# Patient Record
Sex: MALE | Race: WHITE | NOT HISPANIC OR LATINO
[De-identification: names, ages, dates, MRNs, and addresses within clinical notes are randomized per-mention and may not be internally consistent; named-entity substitution may affect disease eponyms.]

---

## 2017-01-12 ENCOUNTER — APPOINTMENT (OUTPATIENT)
Dept: PLASTIC SURGERY | Facility: CLINIC | Age: 62
End: 2017-01-12

## 2017-01-12 VITALS
WEIGHT: 220 LBS | SYSTOLIC BLOOD PRESSURE: 118 MMHG | RESPIRATION RATE: 15 BRPM | BODY MASS INDEX: 28.23 KG/M2 | HEIGHT: 74 IN | HEART RATE: 65 BPM | DIASTOLIC BLOOD PRESSURE: 63 MMHG

## 2017-06-28 ENCOUNTER — FORM ENCOUNTER (OUTPATIENT)
Age: 62
End: 2017-06-28

## 2017-06-29 ENCOUNTER — APPOINTMENT (OUTPATIENT)
Dept: UROLOGY | Facility: CLINIC | Age: 62
End: 2017-06-29

## 2017-06-29 ENCOUNTER — APPOINTMENT (OUTPATIENT)
Dept: MRI IMAGING | Facility: IMAGING CENTER | Age: 62
End: 2017-06-29

## 2017-06-29 ENCOUNTER — OUTPATIENT (OUTPATIENT)
Dept: OUTPATIENT SERVICES | Facility: HOSPITAL | Age: 62
LOS: 1 days | End: 2017-06-29
Payer: COMMERCIAL

## 2017-06-29 VITALS
WEIGHT: 216 LBS | TEMPERATURE: 98.1 F | HEIGHT: 74 IN | HEART RATE: 63 BPM | RESPIRATION RATE: 17 BRPM | BODY MASS INDEX: 27.72 KG/M2 | DIASTOLIC BLOOD PRESSURE: 75 MMHG | SYSTOLIC BLOOD PRESSURE: 135 MMHG

## 2017-06-29 DIAGNOSIS — Z98.89 OTHER SPECIFIED POSTPROCEDURAL STATES: Chronic | ICD-10-CM

## 2017-06-29 DIAGNOSIS — R31.0 GROSS HEMATURIA: ICD-10-CM

## 2017-06-29 PROCEDURE — 82565 ASSAY OF CREATININE: CPT

## 2017-06-29 PROCEDURE — A9585: CPT

## 2017-06-29 PROCEDURE — 74183 MRI ABD W/O CNTR FLWD CNTR: CPT | Mod: 26

## 2017-06-29 PROCEDURE — 74183 MRI ABD W/O CNTR FLWD CNTR: CPT

## 2017-07-10 ENCOUNTER — APPOINTMENT (OUTPATIENT)
Dept: UROLOGY | Facility: CLINIC | Age: 62
End: 2017-07-10

## 2017-07-10 ENCOUNTER — OUTPATIENT (OUTPATIENT)
Dept: OUTPATIENT SERVICES | Facility: HOSPITAL | Age: 62
LOS: 1 days | End: 2017-07-10
Payer: COMMERCIAL

## 2017-07-10 VITALS
HEART RATE: 63 BPM | TEMPERATURE: 97.5 F | SYSTOLIC BLOOD PRESSURE: 114 MMHG | RESPIRATION RATE: 16 BRPM | DIASTOLIC BLOOD PRESSURE: 68 MMHG

## 2017-07-10 DIAGNOSIS — Z98.89 OTHER SPECIFIED POSTPROCEDURAL STATES: Chronic | ICD-10-CM

## 2017-07-10 DIAGNOSIS — R35.0 FREQUENCY OF MICTURITION: ICD-10-CM

## 2017-07-10 PROCEDURE — 51784 ANAL/URINARY MUSCLE STUDY: CPT

## 2017-07-10 PROCEDURE — 51797 INTRAABDOMINAL PRESSURE TEST: CPT

## 2017-07-10 PROCEDURE — 51728 CYSTOMETROGRAM W/VP: CPT

## 2017-07-10 PROCEDURE — 51741 ELECTRO-UROFLOWMETRY FIRST: CPT

## 2017-07-14 ENCOUNTER — APPOINTMENT (OUTPATIENT)
Dept: UROLOGY | Facility: CLINIC | Age: 62
End: 2017-07-14

## 2017-07-14 VITALS — SYSTOLIC BLOOD PRESSURE: 113 MMHG | HEART RATE: 65 BPM | DIASTOLIC BLOOD PRESSURE: 72 MMHG

## 2017-07-14 DIAGNOSIS — N39.41 URGE INCONTINENCE: ICD-10-CM

## 2017-07-23 ENCOUNTER — FORM ENCOUNTER (OUTPATIENT)
Age: 62
End: 2017-07-23

## 2017-07-24 ENCOUNTER — APPOINTMENT (OUTPATIENT)
Dept: RADIOLOGY | Facility: CLINIC | Age: 62
End: 2017-07-24

## 2017-07-24 ENCOUNTER — OUTPATIENT (OUTPATIENT)
Dept: OUTPATIENT SERVICES | Facility: HOSPITAL | Age: 62
LOS: 1 days | End: 2017-07-24
Payer: COMMERCIAL

## 2017-07-24 DIAGNOSIS — Z98.89 OTHER SPECIFIED POSTPROCEDURAL STATES: Chronic | ICD-10-CM

## 2017-07-24 DIAGNOSIS — Z00.8 ENCOUNTER FOR OTHER GENERAL EXAMINATION: ICD-10-CM

## 2017-07-24 PROCEDURE — 77074 RADEX OSSEOUS SURVEY LMTD: CPT

## 2017-07-27 ENCOUNTER — FORM ENCOUNTER (OUTPATIENT)
Age: 62
End: 2017-07-27

## 2017-07-28 ENCOUNTER — OUTPATIENT (OUTPATIENT)
Dept: OUTPATIENT SERVICES | Facility: HOSPITAL | Age: 62
LOS: 1 days | End: 2017-07-28
Payer: COMMERCIAL

## 2017-07-28 ENCOUNTER — APPOINTMENT (OUTPATIENT)
Dept: NUCLEAR MEDICINE | Facility: IMAGING CENTER | Age: 62
End: 2017-07-28
Payer: COMMERCIAL

## 2017-07-28 DIAGNOSIS — Z98.89 OTHER SPECIFIED POSTPROCEDURAL STATES: Chronic | ICD-10-CM

## 2017-07-28 DIAGNOSIS — M89.9 DISORDER OF BONE, UNSPECIFIED: ICD-10-CM

## 2017-07-28 PROCEDURE — 78306 BONE IMAGING WHOLE BODY: CPT

## 2017-07-28 PROCEDURE — 78306 BONE IMAGING WHOLE BODY: CPT | Mod: 26

## 2017-07-28 PROCEDURE — 78320: CPT | Mod: 26

## 2017-07-28 PROCEDURE — 78999 UNLISTED MISC PX DX NUC MED: CPT

## 2017-07-28 PROCEDURE — A9561: CPT

## 2018-05-31 ENCOUNTER — APPOINTMENT (OUTPATIENT)
Dept: PLASTIC SURGERY | Facility: CLINIC | Age: 63
End: 2018-05-31
Payer: COMMERCIAL

## 2018-05-31 PROCEDURE — 99214 OFFICE O/P EST MOD 30 MIN: CPT

## 2018-06-05 ENCOUNTER — OUTPATIENT (OUTPATIENT)
Dept: OUTPATIENT SERVICES | Facility: HOSPITAL | Age: 63
LOS: 1 days | End: 2018-06-05
Payer: COMMERCIAL

## 2018-06-05 VITALS
OXYGEN SATURATION: 97 % | DIASTOLIC BLOOD PRESSURE: 72 MMHG | HEART RATE: 108 BPM | RESPIRATION RATE: 14 BRPM | WEIGHT: 222.01 LBS | HEIGHT: 72 IN | SYSTOLIC BLOOD PRESSURE: 126 MMHG | TEMPERATURE: 97 F

## 2018-06-05 DIAGNOSIS — M95.0 ACQUIRED DEFORMITY OF NOSE: ICD-10-CM

## 2018-06-05 DIAGNOSIS — Z98.89 OTHER SPECIFIED POSTPROCEDURAL STATES: Chronic | ICD-10-CM

## 2018-06-05 DIAGNOSIS — Z86.79 PERSONAL HISTORY OF OTHER DISEASES OF THE CIRCULATORY SYSTEM: ICD-10-CM

## 2018-06-05 DIAGNOSIS — C44.91 BASAL CELL CARCINOMA OF SKIN, UNSPECIFIED: ICD-10-CM

## 2018-06-05 DIAGNOSIS — M95.9 ACQUIRED DEFORMITY OF MUSCULOSKELETAL SYSTEM, UNSPECIFIED: Chronic | ICD-10-CM

## 2018-06-05 LAB
BUN SERPL-MCNC: 22 MG/DL — SIGNIFICANT CHANGE UP (ref 7–23)
CALCIUM SERPL-MCNC: 9.3 MG/DL — SIGNIFICANT CHANGE UP (ref 8.4–10.5)
CHLORIDE SERPL-SCNC: 101 MMOL/L — SIGNIFICANT CHANGE UP (ref 98–107)
CO2 SERPL-SCNC: 22 MMOL/L — SIGNIFICANT CHANGE UP (ref 22–31)
CREAT SERPL-MCNC: 1.14 MG/DL — SIGNIFICANT CHANGE UP (ref 0.5–1.3)
GLUCOSE SERPL-MCNC: 91 MG/DL — SIGNIFICANT CHANGE UP (ref 70–99)
HCT VFR BLD CALC: 41.5 % — SIGNIFICANT CHANGE UP (ref 39–50)
HGB BLD-MCNC: 13 G/DL — SIGNIFICANT CHANGE UP (ref 13–17)
MCHC RBC-ENTMCNC: 26.7 PG — LOW (ref 27–34)
MCHC RBC-ENTMCNC: 31.3 % — LOW (ref 32–36)
MCV RBC AUTO: 85.4 FL — SIGNIFICANT CHANGE UP (ref 80–100)
NRBC # FLD: 0 — SIGNIFICANT CHANGE UP
PLATELET # BLD AUTO: 290 K/UL — SIGNIFICANT CHANGE UP (ref 150–400)
PMV BLD: 11.7 FL — SIGNIFICANT CHANGE UP (ref 7–13)
POTASSIUM SERPL-MCNC: 4.5 MMOL/L — SIGNIFICANT CHANGE UP (ref 3.5–5.3)
POTASSIUM SERPL-SCNC: 4.5 MMOL/L — SIGNIFICANT CHANGE UP (ref 3.5–5.3)
RBC # BLD: 4.86 M/UL — SIGNIFICANT CHANGE UP (ref 4.2–5.8)
RBC # FLD: 15.8 % — HIGH (ref 10.3–14.5)
SODIUM SERPL-SCNC: 139 MMOL/L — SIGNIFICANT CHANGE UP (ref 135–145)
WBC # BLD: 4.42 K/UL — SIGNIFICANT CHANGE UP (ref 3.8–10.5)
WBC # FLD AUTO: 4.42 K/UL — SIGNIFICANT CHANGE UP (ref 3.8–10.5)

## 2018-06-05 PROCEDURE — 93010 ELECTROCARDIOGRAM REPORT: CPT

## 2018-06-05 NOTE — H&P PST ADULT - NSANTHOSAYNRD_GEN_A_CORE
No. SHRAVAN screening performed.  STOP BANG Legend: 0-2 = LOW Risk; 3-4 = INTERMEDIATE Risk; 5-8 = HIGH Risk

## 2018-06-05 NOTE — H&P PST ADULT - NEGATIVE BREAST SYMPTOMS
no nipple discharge L/no breast lump L/no nipple discharge R/no breast tenderness R/no breast lump R/no breast tenderness L

## 2018-06-05 NOTE — H&P PST ADULT - PROBLEM SELECTOR PLAN 1
Pt scheduled for left nasal reconstruction with integra, possible local flap or skin graft on 6/12/2018.  labs done results pending, ekg done.  Preop teaching done, pt able to verbalize understanding.    Limited ROM of neck, difficulty with extension.

## 2018-06-05 NOTE — H&P PST ADULT - NEGATIVE CARDIOVASCULAR SYMPTOMS
no claudication/no orthopnea/no peripheral edema/no paroxysmal nocturnal dyspnea/no chest pain/no dyspnea on exertion/no palpitations

## 2018-06-05 NOTE — H&P PST ADULT - PMH
Basal cell carcinoma  nose  BPH (benign prostatic hypertrophy)    Bulging  cervical disc  Cataract of right eye    Degenerated intervertebral disc  lumbar  H/O atrial fibrillation without current medication  occurred post op 12/2012, pt. was on Cardizem x1 month afterwards  H/O polycythemia vera    HTN (hypertension)    Hyperlipidemia    Melanoma in situ of skin of trunk    Peripheral neuropathy  "both feet"  Plantar fasciitis, bilateral    Psoriasis (a type of skin inflammation)    Renal cell carcinoma

## 2018-06-05 NOTE — H&P PST ADULT - FAMILY HISTORY
Father  Still living? Unknown  Family history of CHF (congestive heart failure), Age at diagnosis: Age Unknown     Mother  Still living? Unknown  Family history of CHF (congestive heart failure), Age at diagnosis: Age Unknown  Family history of kidney disease, Age at diagnosis: Age Unknown     Sibling  Still living? Unknown  Family history of diabetes mellitus, Age at diagnosis: Age Unknown

## 2018-06-05 NOTE — H&P PST ADULT - NEGATIVE GENERAL SYMPTOMS
no chills/no fever/no weight gain/no polyuria/no polydipsia/no malaise/no sweating/no anorexia/no weight loss/no polyphagia/no fatigue

## 2018-06-05 NOTE — H&P PST ADULT - MUSCULOSKELETAL COMMENTS
lower back and neck arthritis. Pt reports neck stiffness, denies numbness to hands or fingers bilaterally

## 2018-06-05 NOTE — H&P PST ADULT - RS GEN PE MLT RESP DETAILS PC
respirations non-labored/no rhonchi/no rales/no wheezes/clear to auscultation bilaterally no rales/no rhonchi/breath sounds equal/no intercostal retractions/no wheezes/clear to auscultation bilaterally/respirations non-labored/good air movement

## 2018-06-05 NOTE — H&P PST ADULT - GASTROINTESTINAL DETAILS
soft/nontender/bowel sounds normal no distention/no masses palpable/no bruit/no rebound tenderness/no rigidity/soft/nontender/bowel sounds normal/no guarding/no organomegaly

## 2018-06-05 NOTE — H&P PST ADULT - NEGATIVE GENERAL GENITOURINARY SYMPTOMS
no hematuria/no flank pain L/no urinary hesitancy/no dysuria/normal urinary frequency/no flank pain R/no bladder infections

## 2018-06-05 NOTE — H&P PST ADULT - PSH
H/O partial nephrectomy  right-12/17/12  Left varicocele    Melanoma in situ of back  x1-1983, x2-20013  S/P cystoscopy  5/15/14  S/P cystoscopy  TURBT-05/2012 H/O partial nephrectomy  right-12/17/12  Left varicocele    Melanoma in situ of back  x1-1983, x2-05214  Mohs defect  mohs procedure bilateral sides of nose with skin graft from clavicular areas 7/2016  S/P cystoscopy  5/15/14  S/P cystoscopy  TURBT-05/2012

## 2018-06-05 NOTE — H&P PST ADULT - PROBLEM SELECTOR PLAN 2
hx of 1 episode of afib in 2012 tx with Cardizem.  EKG shows afib, Case discussed with Dr. Alvarado pmd , has cardiology in office,  stated to send to pt immediately over.  Discussed with Dr. Kwan , surgical coordinator Lisa notified.

## 2018-06-05 NOTE — H&P PST ADULT - NEUROLOGICAL COMMENTS
possible due to polycythemia vera has for a long time Peripheral neuropathy (B) feet.  Pt reports he gets numbness and tingling (B) feet.  denies numbness / tingling fingers or hands bilaterally

## 2018-06-05 NOTE — H&P PST ADULT - HISTORY OF PRESENT ILLNESS
62y/o male scheduled for left nasal reconstruction with integra, possible local flap or skin graft on 6/12/2018,  Pt states, " 62y/o male scheduled for left nasal reconstruction with integra, possible local flap or skin graft on 6/12/2018,  Pt states, "hx of recurrent basal cell carcinoma with mohs procedure.  Two weeks ago noticed lesion on left side of nose."

## 2018-06-05 NOTE — H&P PST ADULT - SKIN COMMENTS
bandage dressing to nose, blood stained.  No active bleeding noted left side of nose with pink lesion

## 2018-06-11 ENCOUNTER — RX RENEWAL (OUTPATIENT)
Age: 63
End: 2018-06-11

## 2018-06-11 ENCOUNTER — TRANSCRIPTION ENCOUNTER (OUTPATIENT)
Age: 63
End: 2018-06-11

## 2018-06-12 ENCOUNTER — OUTPATIENT (OUTPATIENT)
Dept: OUTPATIENT SERVICES | Facility: HOSPITAL | Age: 63
LOS: 1 days | Discharge: HOME | End: 2018-06-12
Payer: COMMERCIAL

## 2018-06-12 VITALS
DIASTOLIC BLOOD PRESSURE: 59 MMHG | SYSTOLIC BLOOD PRESSURE: 111 MMHG | HEART RATE: 63 BPM | OXYGEN SATURATION: 99 % | RESPIRATION RATE: 20 BRPM

## 2018-06-12 VITALS
OXYGEN SATURATION: 99 % | HEART RATE: 65 BPM | DIASTOLIC BLOOD PRESSURE: 61 MMHG | RESPIRATION RATE: 20 BRPM | HEIGHT: 60 IN | SYSTOLIC BLOOD PRESSURE: 116 MMHG | TEMPERATURE: 98 F | WEIGHT: 222.01 LBS

## 2018-06-12 DIAGNOSIS — Z98.89 OTHER SPECIFIED POSTPROCEDURAL STATES: Chronic | ICD-10-CM

## 2018-06-12 DIAGNOSIS — M95.0 ACQUIRED DEFORMITY OF NOSE: ICD-10-CM

## 2018-06-12 DIAGNOSIS — M95.9 ACQUIRED DEFORMITY OF MUSCULOSKELETAL SYSTEM, UNSPECIFIED: Chronic | ICD-10-CM

## 2018-06-12 PROCEDURE — 14060 TIS TRNFR E/N/E/L 10 SQ CM/<: CPT

## 2018-06-12 NOTE — ASU DISCHARGE PLAN (ADULT/PEDIATRIC). - NOTIFY
Fever greater than 101/Bleeding that does not stop Pain not relieved by Medications/Bleeding that does not stop/Unable to Urinate/Increased Irritability or Sluggishness/Fever greater than 101/Inability to Tolerate Liquids or Foods/Swelling that continues/Persistent Nausea and Vomiting

## 2018-06-18 ENCOUNTER — APPOINTMENT (OUTPATIENT)
Dept: PLASTIC SURGERY | Facility: CLINIC | Age: 63
End: 2018-06-18
Payer: COMMERCIAL

## 2018-06-18 PROCEDURE — 99024 POSTOP FOLLOW-UP VISIT: CPT

## 2018-06-21 ENCOUNTER — OTHER (OUTPATIENT)
Age: 63
End: 2018-06-21

## 2018-06-25 ENCOUNTER — APPOINTMENT (OUTPATIENT)
Dept: PLASTIC SURGERY | Facility: CLINIC | Age: 63
End: 2018-06-25
Payer: COMMERCIAL

## 2018-06-25 PROCEDURE — 99024 POSTOP FOLLOW-UP VISIT: CPT

## 2018-06-28 ENCOUNTER — APPOINTMENT (OUTPATIENT)
Dept: CARDIOLOGY | Facility: CLINIC | Age: 63
End: 2018-06-28
Payer: COMMERCIAL

## 2018-06-28 PROCEDURE — 93015 CV STRESS TEST SUPVJ I&R: CPT

## 2018-07-09 ENCOUNTER — APPOINTMENT (OUTPATIENT)
Dept: PLASTIC SURGERY | Facility: CLINIC | Age: 63
End: 2018-07-09
Payer: COMMERCIAL

## 2018-07-09 PROCEDURE — 99024 POSTOP FOLLOW-UP VISIT: CPT

## 2018-07-16 ENCOUNTER — APPOINTMENT (OUTPATIENT)
Dept: CARDIOLOGY | Facility: CLINIC | Age: 63
End: 2018-07-16
Payer: COMMERCIAL

## 2018-07-16 PROCEDURE — 93015 CV STRESS TEST SUPVJ I&R: CPT

## 2018-07-16 PROCEDURE — A9500: CPT

## 2018-07-16 PROCEDURE — 78452 HT MUSCLE IMAGE SPECT MULT: CPT

## 2018-08-28 ENCOUNTER — APPOINTMENT (OUTPATIENT)
Dept: UROLOGY | Facility: CLINIC | Age: 63
End: 2018-08-28
Payer: COMMERCIAL

## 2018-08-28 ENCOUNTER — OUTPATIENT (OUTPATIENT)
Dept: OUTPATIENT SERVICES | Facility: HOSPITAL | Age: 63
LOS: 1 days | End: 2018-08-28
Payer: COMMERCIAL

## 2018-08-28 DIAGNOSIS — R35.0 FREQUENCY OF MICTURITION: ICD-10-CM

## 2018-08-28 DIAGNOSIS — M95.9 ACQUIRED DEFORMITY OF MUSCULOSKELETAL SYSTEM, UNSPECIFIED: Chronic | ICD-10-CM

## 2018-08-28 DIAGNOSIS — Z98.89 OTHER SPECIFIED POSTPROCEDURAL STATES: Chronic | ICD-10-CM

## 2018-08-28 PROCEDURE — 99213 OFFICE O/P EST LOW 20 MIN: CPT | Mod: 25

## 2018-08-28 PROCEDURE — 76775 US EXAM ABDO BACK WALL LIM: CPT | Mod: 26

## 2018-08-28 PROCEDURE — 76775 US EXAM ABDO BACK WALL LIM: CPT

## 2018-08-29 DIAGNOSIS — Z85.528 PERSONAL HISTORY OF OTHER MALIGNANT NEOPLASM OF KIDNEY: ICD-10-CM

## 2018-08-30 ENCOUNTER — APPOINTMENT (OUTPATIENT)
Dept: PLASTIC SURGERY | Facility: CLINIC | Age: 63
End: 2018-08-30
Payer: COMMERCIAL

## 2018-08-30 DIAGNOSIS — C44.311 BASAL CELL CARCINOMA OF SKIN OF NOSE: ICD-10-CM

## 2018-08-30 PROCEDURE — 99024 POSTOP FOLLOW-UP VISIT: CPT

## 2018-09-12 LAB
CHOLEST SERPL-MCNC: 183 MG/DL
CHOLEST/HDLC SERPL: 4.1 RATIO
HBA1C MFR BLD HPLC: 5.8 %
HDLC SERPL-MCNC: 45 MG/DL
LDLC SERPL CALC-MCNC: 126 MG/DL
PSA SERPL-MCNC: 0.46 NG/ML
TRIGL SERPL-MCNC: 61 MG/DL

## 2018-12-12 RX ORDER — MIRABEGRON 50 MG/1
50 TABLET, FILM COATED, EXTENDED RELEASE ORAL
Qty: 30 | Refills: 3 | Status: DISCONTINUED | COMMUNITY
Start: 2018-08-28 | End: 2018-12-12

## 2018-12-12 RX ORDER — ACETAMINOPHEN AND CODEINE 300; 30 MG/1; MG/1
300-30 TABLET ORAL
Qty: 20 | Refills: 0 | Status: DISCONTINUED | COMMUNITY
Start: 2018-06-11 | End: 2018-12-12

## 2018-12-27 PROBLEM — Z87.39 HISTORY OF GOUT: Status: RESOLVED | Noted: 2018-12-27 | Resolved: 2018-12-27

## 2018-12-27 PROBLEM — Z86.69 HISTORY OF ACUTE CONJUNCTIVITIS: Status: RESOLVED | Noted: 2018-12-27 | Resolved: 2018-12-27

## 2018-12-27 PROBLEM — J22 ACUTE RESPIRATORY INFECTION: Status: RESOLVED | Noted: 2018-12-27 | Resolved: 2018-12-27

## 2018-12-27 RX ORDER — UBIDECARENONE 200 MG
200 CAPSULE ORAL DAILY
Refills: 0 | Status: ACTIVE | COMMUNITY

## 2018-12-28 ENCOUNTER — NON-APPOINTMENT (OUTPATIENT)
Age: 63
End: 2018-12-28

## 2018-12-28 ENCOUNTER — RECORD ABSTRACTING (OUTPATIENT)
Age: 63
End: 2018-12-28

## 2018-12-28 ENCOUNTER — APPOINTMENT (OUTPATIENT)
Dept: INTERNAL MEDICINE | Facility: CLINIC | Age: 63
End: 2018-12-28
Payer: COMMERCIAL

## 2018-12-28 VITALS
BODY MASS INDEX: 28.49 KG/M2 | HEIGHT: 73 IN | WEIGHT: 215 LBS | DIASTOLIC BLOOD PRESSURE: 70 MMHG | SYSTOLIC BLOOD PRESSURE: 112 MMHG | HEART RATE: 54 BPM

## 2018-12-28 DIAGNOSIS — S01.20XA UNSPECIFIED OPEN WOUND OF NOSE, INITIAL ENCOUNTER: ICD-10-CM

## 2018-12-28 DIAGNOSIS — R39.15 URGENCY OF URINATION: ICD-10-CM

## 2018-12-28 DIAGNOSIS — Z84.1 FAMILY HISTORY OF DISORDERS OF KIDNEY AND URETER: ICD-10-CM

## 2018-12-28 DIAGNOSIS — J22 UNSPECIFIED ACUTE LOWER RESPIRATORY INFECTION: ICD-10-CM

## 2018-12-28 DIAGNOSIS — M54.2 CERVICALGIA: ICD-10-CM

## 2018-12-28 DIAGNOSIS — Z86.69 PERSONAL HISTORY OF OTHER DISEASES OF THE NERVOUS SYSTEM AND SENSE ORGANS: ICD-10-CM

## 2018-12-28 DIAGNOSIS — Z87.39 PERSONAL HISTORY OF OTHER DISEASES OF THE MUSCULOSKELETAL SYSTEM AND CONNECTIVE TISSUE: ICD-10-CM

## 2018-12-28 DIAGNOSIS — Z86.19 PERSONAL HISTORY OF OTHER INFECTIOUS AND PARASITIC DISEASES: ICD-10-CM

## 2018-12-28 LAB
BILIRUB UR QL STRIP: NEGATIVE
CLARITY UR: CLEAR
COLLECTION METHOD: NORMAL
GLUCOSE UR-MCNC: NEGATIVE
HCG UR QL: 0.2 EU/DL
HGB UR QL STRIP.AUTO: NORMAL
KETONES UR-MCNC: NEGATIVE
LEUKOCYTE ESTERASE UR QL STRIP: NEGATIVE
NITRITE UR QL STRIP: NEGATIVE
PH UR STRIP: 7
PROT UR STRIP-MCNC: NEGATIVE
SP GR UR STRIP: 1.02

## 2018-12-28 PROCEDURE — 36415 COLL VENOUS BLD VENIPUNCTURE: CPT

## 2018-12-28 PROCEDURE — 93000 ELECTROCARDIOGRAM COMPLETE: CPT

## 2018-12-28 PROCEDURE — 99396 PREV VISIT EST AGE 40-64: CPT | Mod: 25

## 2018-12-28 PROCEDURE — 81003 URINALYSIS AUTO W/O SCOPE: CPT | Mod: QW

## 2018-12-28 RX ORDER — OXYBUTYNIN CHLORIDE 10 MG/1
10 TABLET, EXTENDED RELEASE ORAL DAILY
Qty: 30 | Refills: 0 | Status: DISCONTINUED | COMMUNITY
Start: 2018-11-15 | End: 2018-12-28

## 2018-12-28 NOTE — PLAN
[FreeTextEntry1] : Blood work. \par Follow up with . Cntinued management by Heme-Onc. \par Derm evaluation of lesion in left ear.

## 2018-12-28 NOTE — HEALTH RISK ASSESSMENT
[Fair] : ~his/her~ current health as fair  [Good] : ~his/her~  mood as  good [No falls in past year] : Patient reported no falls in the past year [1] : 2) Feeling down, depressed, or hopeless for several days (1) [None] : None [With Family] : lives with family [Employed] : employed [] :  [Sexually Active] : sexually active [Feels Safe at Home] : Feels safe at home [Fully functional (bathing, dressing, toileting, transferring, walking, feeding)] : Fully functional (bathing, dressing, toileting, transferring, walking, feeding) [Fully functional (using the telephone, shopping, preparing meals, housekeeping, doing laundry, using] : Fully functional and needs no help or supervision to perform IADLs (using the telephone, shopping, preparing meals, housekeeping, doing laundry, using transportation, managing medications and managing finances) [Reports changes in dental health] : Reports changes in dental health [] : No [[id number]] : 2 [Change in mental status noted] : No change in mental status noted [Language] : denies difficulty with language [Behavior] : denies difficulty with behavior [Learning/Retaining New Information] : denies difficulty learning/retaining new information [Handling Complex Tasks] : denies difficulty handling complex tasks [Reasoning] : denies difficulty with reasoning [Spatial Ability and Orientation] : denies difficulty with spatial ability and orientation [Reports changes in hearing] : Reports no changes in hearing [Reports changes in vision] : Reports no changes in vision

## 2018-12-28 NOTE — HISTORY OF PRESENT ILLNESS
[FreeTextEntry1] : CPE [de-identified] : The patient is being seen for a health maintenance evaluation.\par Exercise: The patient exercises regularly--treadmill, weights at least 3x/week\par Diet: No formal diet but low carbs\par Dental: Has had dental exams  \par Hearing: normal\par Vision. Glasses:\par             Checks: 2017\par \par Major problem and concerns:\par Bladder incontinence--has been seen by  had cysto and urodynamics. Options other than watch and wait include Botox injection and electronic stimulation of bladder, both of which the patient refuses.\par Chronic neck pain\par

## 2018-12-28 NOTE — RESULTS/DATA
[Normal] : The 12 - lead ECG is normal [Sinus Bradycardia] : sinus bradycardia [P Waves Normal] : the P wave is normal [ECG Intervals HI.] : HI interval is normal [Normal QRS] : the QRS is normal [ECG Axis] : the QRS axis is normal [Normal ST Segments] : the ST segments are normal [ECG T. Waves] : normal [No Interval Change] : no interval change

## 2018-12-28 NOTE — PHYSICAL EXAM
[No Acute Distress] : no acute distress [Well Nourished] : well nourished [Well Developed] : well developed [Well-Appearing] : well-appearing [Normal Sclera/Conjunctiva] : normal sclera/conjunctiva [EOMI] : extraocular movements intact [Normal Outer Ear/Nose] : the outer ears and nose were normal in appearance [Normal Oropharynx] : the oropharynx was normal [No JVD] : no jugular venous distention [Supple] : supple [No Lymphadenopathy] : no lymphadenopathy [Thyroid Normal, No Nodules] : the thyroid was normal and there were no nodules present [No Respiratory Distress] : no respiratory distress  [Clear to Auscultation] : lungs were clear to auscultation bilaterally [No Accessory Muscle Use] : no accessory muscle use [Normal Percussion] : the chest was normal to percussion [Normal Rate] : normal rate  [Regular Rhythm] : with a regular rhythm [Normal S1, S2] : normal S1 and S2 [No Murmur] : no murmur heard [No Carotid Bruits] : no carotid bruits [No Abdominal Bruit] : a ~M bruit was not heard ~T in the abdomen [No Varicosities] : no varicosities [Pedal Pulses Present] : the pedal pulses are present [No Edema] : there was no peripheral edema [No Extremity Clubbing/Cyanosis] : no extremity clubbing/cyanosis [No Palpable Aorta] : no palpable aorta [Normal Appearance] : normal in appearance [No Nipple Discharge] : no nipple discharge [Soft] : abdomen soft [Non Tender] : non-tender [Non-distended] : non-distended [No Masses] : no abdominal mass palpated [No HSM] : no HSM [Normal Bowel Sounds] : normal bowel sounds [Declined Rectal Exam] : declined rectal exam [Normal Supraclavicular Nodes] : no supraclavicular lymphadenopathy [Normal Axillary Nodes] : no axillary lymphadenopathy [Normal Posterior Cervical Nodes] : no posterior cervical lymphadenopathy [Normal Anterior Cervical Nodes] : no anterior cervical lymphadenopathy [No CVA Tenderness] : no CVA  tenderness [No Spinal Tenderness] : no spinal tenderness [No Joint Swelling] : no joint swelling [Grossly Normal Strength/Tone] : grossly normal strength/tone [No Rash] : no rash [Normal Gait] : normal gait [Coordination Grossly Intact] : coordination grossly intact [No Focal Deficits] : no focal deficits [Deep Tendon Reflexes (DTR)] : deep tendon reflexes were 2+ and symmetric [Speech Grossly Normal] : speech grossly normal [Memory Grossly Normal] : memory grossly normal [Normal Affect] : the affect was normal [Alert and Oriented x3] : oriented to person, place, and time [Normal Mood] : the mood was normal [Normal Insight/Judgement] : insight and judgment were intact [de-identified] : cerumen bilaterally but TMs are normal [de-identified] : small 2 mm lesion in pinna of left ear, not pigmented.

## 2018-12-28 NOTE — REVIEW OF SYSTEMS
[Constipation] : constipation [Incontinence] : incontinence [Frequency] : frequency [Back Pain] : back pain [Negative] : Psychiatric [Fever] : no fever [Chills] : no chills [Fatigue] : no fatigue [Night Sweats] : no night sweats [Recent Change In Weight] : ~T no recent weight change [Earache] : no earache [Nosebleeds] : no nosebleeds [Nasal Discharge] : no nasal discharge [Hoarseness] : no hoarseness [Chest Pain] : no chest pain [Palpitations] : no palpitations [Claudication] : no  leg claudication [Lower Ext Edema] : no lower extremity edema [Orthopena] : no orthopnea [Paroysmal Nocturnal Dyspnea] : no paroysmal nocturnal dyspnea [Dyspnea on Exertion] : not dyspnea on exertion [Abdominal Pain] : no abdominal pain [Nausea] : no nausea [Diarrhea] : no diarrhea [Vomiting] : no vomiting [Heartburn] : no heartburn [Joint Pain] : no joint pain [Joint Stiffness] : no joint stiffness [Joint Swelling] : no joint swelling [Itching] : no itching [Skin Rash] : no skin rash [Headache] : no headache [Dizziness] : no dizziness [Fainting] : no fainting [Confusion] : no confusion [Memory Loss] : no memory loss [Easy Bleeding] : no easy bleeding [Easy Bruising] : no easy bruising [Swollen Glands] : no swollen glands [FreeTextEntry8] : incontinence mild not wearing a pad, uses a urinal [FreeTextEntry9] : lower back and neck pain, cervical spondylopathy with neck pain in trapezii

## 2018-12-31 LAB
ALBUMIN SERPL ELPH-MCNC: 4.6 G/DL
ALP BLD-CCNC: 48 U/L
ALT SERPL-CCNC: 34 U/L
ANION GAP SERPL CALC-SCNC: 10 MMOL/L
AST SERPL-CCNC: 33 U/L
BASOPHILS # BLD AUTO: 0.01 K/UL
BASOPHILS NFR BLD AUTO: 0.3 %
BILIRUB SERPL-MCNC: 0.4 MG/DL
BUN SERPL-MCNC: 17 MG/DL
CALCIUM SERPL-MCNC: 9.8 MG/DL
CHLORIDE SERPL-SCNC: 105 MMOL/L
CHOLEST SERPL-MCNC: 173 MG/DL
CHOLEST/HDLC SERPL: 3.4 RATIO
CO2 SERPL-SCNC: 26 MMOL/L
CREAT SERPL-MCNC: 1.14 MG/DL
EOSINOPHIL # BLD AUTO: 0.04 K/UL
EOSINOPHIL NFR BLD AUTO: 1.3 %
GLUCOSE SERPL-MCNC: 101 MG/DL
HBA1C MFR BLD HPLC: 5.8 %
HCT VFR BLD CALC: 39.7 %
HDLC SERPL-MCNC: 51 MG/DL
HGB BLD-MCNC: 12.5 G/DL
IMM GRANULOCYTES NFR BLD AUTO: 0.3 %
LDLC SERPL CALC-MCNC: 112 MG/DL
LYMPHOCYTES # BLD AUTO: 0.92 K/UL
LYMPHOCYTES NFR BLD AUTO: 28.8 %
MAN DIFF?: NORMAL
MCHC RBC-ENTMCNC: 26.7 PG
MCHC RBC-ENTMCNC: 31.5 GM/DL
MCV RBC AUTO: 84.8 FL
MONOCYTES # BLD AUTO: 0.18 K/UL
MONOCYTES NFR BLD AUTO: 5.6 %
NEUTROPHILS # BLD AUTO: 2.04 K/UL
NEUTROPHILS NFR BLD AUTO: 63.7 %
PLATELET # BLD AUTO: 284 K/UL
POTASSIUM SERPL-SCNC: 5.2 MMOL/L
PROT SERPL-MCNC: 6.6 G/DL
RBC # BLD: 4.68 M/UL
RBC # FLD: 15.9 %
SODIUM SERPL-SCNC: 141 MMOL/L
TRIGL SERPL-MCNC: 48 MG/DL
WBC # FLD AUTO: 3.2 K/UL

## 2019-01-04 ENCOUNTER — APPOINTMENT (OUTPATIENT)
Dept: INTERNAL MEDICINE | Facility: CLINIC | Age: 64
End: 2019-01-04

## 2019-01-18 ENCOUNTER — APPOINTMENT (OUTPATIENT)
Dept: INTERNAL MEDICINE | Facility: CLINIC | Age: 64
End: 2019-01-18
Payer: COMMERCIAL

## 2019-01-18 PROCEDURE — 90732 PPSV23 VACC 2 YRS+ SUBQ/IM: CPT

## 2019-01-18 PROCEDURE — G0009: CPT

## 2019-04-16 ENCOUNTER — MEDICATION RENEWAL (OUTPATIENT)
Age: 64
End: 2019-04-16

## 2019-05-10 ENCOUNTER — APPOINTMENT (OUTPATIENT)
Dept: UROLOGY | Facility: CLINIC | Age: 64
End: 2019-05-10
Payer: COMMERCIAL

## 2019-05-10 PROCEDURE — 99214 OFFICE O/P EST MOD 30 MIN: CPT

## 2019-05-16 ENCOUNTER — APPOINTMENT (OUTPATIENT)
Dept: INTERNAL MEDICINE | Facility: CLINIC | Age: 64
End: 2019-05-16
Payer: COMMERCIAL

## 2019-05-16 ENCOUNTER — CHART COPY (OUTPATIENT)
Age: 64
End: 2019-05-16

## 2019-05-16 VITALS
HEIGHT: 73 IN | HEART RATE: 60 BPM | WEIGHT: 215 LBS | BODY MASS INDEX: 28.49 KG/M2 | SYSTOLIC BLOOD PRESSURE: 130 MMHG | DIASTOLIC BLOOD PRESSURE: 80 MMHG

## 2019-05-16 PROCEDURE — 99214 OFFICE O/P EST MOD 30 MIN: CPT

## 2019-05-16 NOTE — PHYSICAL EXAM
[No Acute Distress] : no acute distress [Well Developed] : well developed [Well Nourished] : well nourished [Normal Voice/Communication] : normal voice/communication [Well-Appearing] : well-appearing [Supple] : supple [No JVD] : no jugular venous distention [Thyroid Normal, No Nodules] : the thyroid was normal and there were no nodules present [No Lymphadenopathy] : no lymphadenopathy [No Respiratory Distress] : no respiratory distress  [Clear to Auscultation] : lungs were clear to auscultation bilaterally [No Accessory Muscle Use] : no accessory muscle use [Regular Rhythm] : with a regular rhythm [Normal Rate] : normal rate  [No Carotid Bruits] : no carotid bruits [Normal S1, S2] : normal S1 and S2 [No Edema] : there was no peripheral edema [Soft] : abdomen soft [Non Tender] : non-tender [Normal Bowel Sounds] : normal bowel sounds [Non-distended] : non-distended [Speech Grossly Normal] : speech grossly normal [Memory Grossly Normal] : memory grossly normal [Normal Affect] : the affect was normal [Alert and Oriented x3] : oriented to person, place, and time [Normal Mood] : the mood was normal

## 2019-05-17 ENCOUNTER — OUTPATIENT (OUTPATIENT)
Dept: OUTPATIENT SERVICES | Facility: HOSPITAL | Age: 64
LOS: 1 days | End: 2019-05-17
Payer: COMMERCIAL

## 2019-05-17 VITALS
TEMPERATURE: 97 F | HEIGHT: 71.5 IN | HEART RATE: 52 BPM | DIASTOLIC BLOOD PRESSURE: 70 MMHG | RESPIRATION RATE: 16 BRPM | SYSTOLIC BLOOD PRESSURE: 120 MMHG | WEIGHT: 220.9 LBS

## 2019-05-17 DIAGNOSIS — N39.41 URGE INCONTINENCE: ICD-10-CM

## 2019-05-17 DIAGNOSIS — M95.9 ACQUIRED DEFORMITY OF MUSCULOSKELETAL SYSTEM, UNSPECIFIED: Chronic | ICD-10-CM

## 2019-05-17 DIAGNOSIS — Z98.89 OTHER SPECIFIED POSTPROCEDURAL STATES: Chronic | ICD-10-CM

## 2019-05-17 LAB
ANION GAP SERPL CALC-SCNC: 12 MMO/L — SIGNIFICANT CHANGE UP (ref 7–14)
APPEARANCE UR: CLEAR — SIGNIFICANT CHANGE UP
BILIRUB UR-MCNC: NEGATIVE — SIGNIFICANT CHANGE UP
BLOOD UR QL VISUAL: NEGATIVE — SIGNIFICANT CHANGE UP
BUN SERPL-MCNC: 23 MG/DL — SIGNIFICANT CHANGE UP (ref 7–23)
CALCIUM SERPL-MCNC: 9.9 MG/DL — SIGNIFICANT CHANGE UP (ref 8.4–10.5)
CHLORIDE SERPL-SCNC: 104 MMOL/L — SIGNIFICANT CHANGE UP (ref 98–107)
CO2 SERPL-SCNC: 25 MMOL/L — SIGNIFICANT CHANGE UP (ref 22–31)
COLOR SPEC: SIGNIFICANT CHANGE UP
CREAT SERPL-MCNC: 1.17 MG/DL — SIGNIFICANT CHANGE UP (ref 0.5–1.3)
GLUCOSE SERPL-MCNC: 97 MG/DL — SIGNIFICANT CHANGE UP (ref 70–99)
GLUCOSE UR-MCNC: NEGATIVE — SIGNIFICANT CHANGE UP
HCT VFR BLD CALC: 39.7 % — SIGNIFICANT CHANGE UP (ref 39–50)
HGB BLD-MCNC: 12.3 G/DL — LOW (ref 13–17)
KETONES UR-MCNC: NEGATIVE — SIGNIFICANT CHANGE UP
LEUKOCYTE ESTERASE UR-ACNC: NEGATIVE — SIGNIFICANT CHANGE UP
MAGNESIUM SERPL-MCNC: 2.1 MG/DL — SIGNIFICANT CHANGE UP (ref 1.6–2.6)
MCHC RBC-ENTMCNC: 26.7 PG — LOW (ref 27–34)
MCHC RBC-ENTMCNC: 31 % — LOW (ref 32–36)
MCV RBC AUTO: 86.3 FL — SIGNIFICANT CHANGE UP (ref 80–100)
NITRITE UR-MCNC: NEGATIVE — SIGNIFICANT CHANGE UP
NRBC # FLD: 0 K/UL — SIGNIFICANT CHANGE UP (ref 0–0)
PH UR: 6.5 — SIGNIFICANT CHANGE UP (ref 5–8)
PHOSPHATE SERPL-MCNC: 3.2 MG/DL — SIGNIFICANT CHANGE UP (ref 2.5–4.5)
PLATELET # BLD AUTO: 263 K/UL — SIGNIFICANT CHANGE UP (ref 150–400)
PMV BLD: 11.3 FL — SIGNIFICANT CHANGE UP (ref 7–13)
POTASSIUM SERPL-MCNC: 4.5 MMOL/L — SIGNIFICANT CHANGE UP (ref 3.5–5.3)
POTASSIUM SERPL-SCNC: 4.5 MMOL/L — SIGNIFICANT CHANGE UP (ref 3.5–5.3)
PROT UR-MCNC: NEGATIVE — SIGNIFICANT CHANGE UP
RBC # BLD: 4.6 M/UL — SIGNIFICANT CHANGE UP (ref 4.2–5.8)
RBC # FLD: 14.7 % — HIGH (ref 10.3–14.5)
SODIUM SERPL-SCNC: 141 MMOL/L — SIGNIFICANT CHANGE UP (ref 135–145)
SP GR SPEC: 1.02 — SIGNIFICANT CHANGE UP (ref 1–1.04)
UROBILINOGEN FLD QL: NORMAL — SIGNIFICANT CHANGE UP
WBC # BLD: 3.4 K/UL — LOW (ref 3.8–10.5)
WBC # FLD AUTO: 3.4 K/UL — LOW (ref 3.8–10.5)

## 2019-05-17 PROCEDURE — 93010 ELECTROCARDIOGRAM REPORT: CPT

## 2019-05-17 RX ORDER — CHLORHEXIDINE GLUCONATE, 0.12% ORAL RINSE 1.2 MG/ML
0.12 SOLUTION DENTAL
Qty: 473 | Refills: 0 | Status: COMPLETED | COMMUNITY
Start: 2018-11-21

## 2019-05-17 RX ORDER — AMOXICILLIN AND CLAVULANATE POTASSIUM 875; 125 MG/1; MG/1
875-125 TABLET, COATED ORAL
Qty: 20 | Refills: 0 | Status: COMPLETED | COMMUNITY
Start: 2019-04-17

## 2019-05-17 NOTE — H&P PST ADULT - NSICDXPASTSURGICALHX_GEN_ALL_CORE_FT
PAST SURGICAL HISTORY:  H/O partial nephrectomy right-12/17/12    Left varicocele     Melanoma in situ of back x1-1983, x2-63513    Mohs defect mohs procedure bilateral sides of nose with skin graft from clavicular areas 7/2016    S/P cystoscopy TURBT-05/2012    S/P cystoscopy 5/15/14

## 2019-05-17 NOTE — H&P PST ADULT - ANTERIOR CERVICAL L
55F, pmh of HTN, DM, HLD presenting with right sided back pain for three days. Patient reports sharp, right sided mid back pain, worse with movement, radiating to her shoulder. Denies any trauma, urinary symptoms, chest pain, difficulty breathing, nausea, vomiting, abdominal pain, rashes. Took Oxycodone and NSAIDs throughout the weekend without relief. 55F, pmh of HTN, DM, HLD presenting with right sided back pain for three days. Patient reports sharp, right sided mid back pain, worse with movement, radiating to her shoulder. Denies any trauma, urinary symptoms, chest pain, difficulty breathing, nausea, vomiting, abdominal pain, rashes. Took Oxycodone and NSAIDs throughout the weekend without relief.  **ATTENDING ADDENDUM (Dr. Keshawn Hernandez): I attest that I have directly and personally interviewed and examined this patient and elicited a comparable history of present illness and review of systems as documented, along with my EM resident. I attest that I have made significant contributions to the documentation where necessary and as noted in the EMR. normal

## 2019-05-17 NOTE — H&P PST ADULT - NSICDXFAMILYHX_GEN_ALL_CORE_FT
FAMILY HISTORY:  Father  Still living? Unknown  Family history of CHF (congestive heart failure), Age at diagnosis: Age Unknown    Mother  Still living? Unknown  Family history of CHF (congestive heart failure), Age at diagnosis: Age Unknown  Family history of kidney disease, Age at diagnosis: Age Unknown    Sibling  Still living? Unknown  Family history of diabetes mellitus, Age at diagnosis: Age Unknown

## 2019-05-17 NOTE — PLAN
[FreeTextEntry1] : The patient is on Xarelto which has a shorter half-life than Coumadin. Less than 48 hours are required to bring the coagulation factors to baseline. He should not be off the Xarelto for more than 48 hours as this may allow an embolus. \par Active atrial fibrillation is not a contraindication to continuation of the surgery if the patient remains hemodynamically stable, although attempts to control rate should be done.

## 2019-05-17 NOTE — H&P PST ADULT - HISTORY OF PRESENT ILLNESS
64y/o male scheduled for left nasal reconstruction with integra, possible local flap or skin graft on 6/12/2018,  Pt states, "hx of recurrent basal cell carcinoma with mohs procedure.  Two weeks ago noticed lesion on left side of nose." 62y/o male presents for preop eval for scheduled cystoscopy with botox on 5/21/19.  Pt with c/o urinary frequency.

## 2019-05-17 NOTE — REVIEW OF SYSTEMS
[Negative] : Cardiovascular [Fatigue] : no fatigue [Chills] : no chills [Fever] : no fever [Night Sweats] : no night sweats [Hot Flashes] : no hot flashes [Nausea] : no nausea [Vomiting] : no vomiting [Diarrhea] : no diarrhea [Constipation] : no constipation [Easy Bleeding] : no easy bleeding [Easy Bruising] : no easy bruising [Swollen Glands] : no swollen glands

## 2019-05-17 NOTE — HISTORY OF PRESENT ILLNESS
[Atrial Fibrillation] : atrial fibrillation [Self] : previous adverse anesthesia reaction [Chronic Anticoagulation] : chronic anticoagulation [(Patient denies any chest pain, claudication, dyspnea on exertion, orthopnea, palpitations or syncope)] : Patient denies any chest pain, claudication, dyspnea on exertion, orthopnea, palpitations or syncope [Excellent (>10 METs)] : Excellent (>10 METs) [Aortic Stenosis] : no aortic stenosis [Recent Myocardial Infarction] : no recent myocardial infarction [Coronary Artery Disease] : no coronary artery disease [Implantable Device/Pacemaker] : no implantable device/pacemaker [COPD] : no COPD [Asthma] : no asthma [Smoker] : not a smoker [Sleep Apnea] : no sleep apnea [Chronic Kidney Disease] : no chronic kidney disease [Diabetes] : no diabetes [FreeTextEntry3] : Gemma Jeter MD [FreeTextEntry2] : 05/21/2019 [FreeTextEntry1] : Botox injection [FreeTextEntry4] : Patient has been refractory to therapy for overactive bladder and urinates frequently. He will undergo botox injection to try to control this. \par He had an episode of atrial fibrillation in the past and is on Xarelto, not warfarin (Coumadin).  [FreeTextEntry7] : Nuclear stress test July 16, 2018 demonstrated normal perfusion and he attained 10.1 METS. .

## 2019-05-17 NOTE — H&P PST ADULT - NSICDXPASTMEDICALHX_GEN_ALL_CORE_FT
PAST MEDICAL HISTORY:  Basal cell carcinoma nose    BPH (benign prostatic hypertrophy)     Bulging cervical disc    Cataract of right eye     Degenerated intervertebral disc lumbar    H/O atrial fibrillation without current medication occurred post op 12/2012, pt. was on Cardizem x1 month afterwards    H/O polycythemia vera     HTN (hypertension)     Hyperlipidemia     Melanoma in situ of skin of trunk     Peripheral neuropathy "both feet"    Plantar fasciitis, bilateral     Psoriasis (a type of skin inflammation)     Renal cell carcinoma

## 2019-05-17 NOTE — ASSESSMENT
[Ischemic Heart Disease] : Ischemic Heart Disease - No (0) [High Risk Surgery - Intraperitoneal, Intrathoracic or Supringuinal Vascular Procedures] : High Risk Surgery - Intraperitoneal, Intrathoracic or Supringuinal Vascular Procedures - No (0) [Congestive Heart Failure] : Congestive Heart Failure - No (0) [Prior Cerebrovascular Accident or TIA] : Prior Cerebrovascular Accident or TIA - No (0) [Creatinine >= 2mg/dL (1 Point)] : Creatinine >= 2mg/dL - No (0) [Insulin-dependent Diabetic (1 Point)] : Insulin-dependent Diabetic - No (0) [0] : 0 , RCRI Class: I, Risk of Post-Op Cardiac Complications: 0.4%, Procedure Risk: Low-Risk [Patient Optimized for Surgery] : Patient optimized for surgery [Modify anticoagulant treatment prior to procedure] : Modify anticoagulant treatment prior to procedure [No Further Testing Recommended] : no further testing recommended [FreeTextEntry5] : Stop Xarelto 48 hours prior to the procedure

## 2019-05-17 NOTE — H&P PST ADULT - NEGATIVE GENERAL SYMPTOMS
no fever/no chills/no malaise/no weight loss/no polydipsia/no fatigue/no weight gain/no polyphagia/no polyuria/no anorexia/no sweating

## 2019-05-17 NOTE — H&P PST ADULT - NEGATIVE GENERAL GENITOURINARY SYMPTOMS
no dysuria/no hematuria/no flank pain L/no bladder infections/normal urinary frequency/no flank pain R/no urinary hesitancy

## 2019-05-17 NOTE — H&P PST ADULT - NEGATIVE ENDOCRINE SYMPTOMS
Price (Use Numbers Only, No Special Characters Or $): 48.00
Other (Free Text): Office Visit
Detail Level: Zone
no cold intolerance/no heat intolerance

## 2019-05-17 NOTE — CONSULT LETTER
[Dear  ___] : Dear  [unfilled], [Please see my note below.] : Please see my note below. [Consult Letter:] : I had the pleasure of evaluating your patient, [unfilled]. [Consult Closing:] : Thank you very much for allowing me to participate in the care of this patient.  If you have any questions, please do not hesitate to contact me. [Sincerely,] : Sincerely, [FreeTextEntry3] : Chichi Lamas MD [facility]

## 2019-05-17 NOTE — H&P PST ADULT - NSICDXPROBLEM_GEN_ALL_CORE_FT
urge incontinence   Scheduled for cystoscopy with botox on 5/21/2019  Preop instructions, gi prophylaxis given  pt verbalized understanding    Polycythemia vera:  pending last hematology note  continue medication per routine schedule  SHRAVAN precautions recommended.  OR booking notified via fax.     Atrial fib:  On xarelto per Dr Lamas (cardiologist), hold for 48 hours prior to surgery  cardiology eval, and most recent cardiology studies in chart.  continue medication per routine schedule    Hypertension:  continue medication per routine schedule

## 2019-05-17 NOTE — RESULTS/DATA
[] : results reviewed [de-identified] : WBC 3.4, H/H normal. He is on Jakafi for polycythemia vera [de-identified] : normal [de-identified] : not sent to us [de-identified] : U/A normal

## 2019-05-17 NOTE — H&P PST ADULT - NEGATIVE CARDIOVASCULAR SYMPTOMS
no paroxysmal nocturnal dyspnea/no claudication/no palpitations/no peripheral edema/no chest pain/no orthopnea/no dyspnea on exertion

## 2019-05-18 LAB
BACTERIA UR CULT: SIGNIFICANT CHANGE UP
SPECIMEN SOURCE: SIGNIFICANT CHANGE UP

## 2019-05-20 ENCOUNTER — TRANSCRIPTION ENCOUNTER (OUTPATIENT)
Age: 64
End: 2019-05-20

## 2019-05-21 ENCOUNTER — OUTPATIENT (OUTPATIENT)
Dept: OUTPATIENT SERVICES | Facility: HOSPITAL | Age: 64
LOS: 1 days | Discharge: HOME | End: 2019-05-21
Payer: COMMERCIAL

## 2019-05-21 ENCOUNTER — RESULT REVIEW (OUTPATIENT)
Age: 64
End: 2019-05-21

## 2019-05-21 ENCOUNTER — APPOINTMENT (OUTPATIENT)
Dept: UROLOGY | Facility: AMBULATORY SURGERY CENTER | Age: 64
End: 2019-05-21

## 2019-05-21 VITALS
WEIGHT: 220.9 LBS | HEART RATE: 56 BPM | DIASTOLIC BLOOD PRESSURE: 68 MMHG | TEMPERATURE: 98 F | SYSTOLIC BLOOD PRESSURE: 123 MMHG | HEIGHT: 71.5 IN | RESPIRATION RATE: 98 BRPM

## 2019-05-21 VITALS — OXYGEN SATURATION: 97 % | HEART RATE: 50 BPM | RESPIRATION RATE: 12 BRPM

## 2019-05-21 DIAGNOSIS — Z98.89 OTHER SPECIFIED POSTPROCEDURAL STATES: Chronic | ICD-10-CM

## 2019-05-21 DIAGNOSIS — N39.41 URGE INCONTINENCE: ICD-10-CM

## 2019-05-21 DIAGNOSIS — M95.9 ACQUIRED DEFORMITY OF MUSCULOSKELETAL SYSTEM, UNSPECIFIED: Chronic | ICD-10-CM

## 2019-05-21 PROCEDURE — 88307 TISSUE EXAM BY PATHOLOGIST: CPT | Mod: 26

## 2019-05-21 PROCEDURE — 52235 CYSTOSCOPY AND TREATMENT: CPT

## 2019-05-21 RX ORDER — SODIUM CHLORIDE 9 MG/ML
1000 INJECTION, SOLUTION INTRAVENOUS
Refills: 0 | Status: DISCONTINUED | OUTPATIENT
Start: 2019-05-21 | End: 2019-06-05

## 2019-05-21 RX ORDER — AZTREONAM 2 G
1 VIAL (EA) INJECTION
Qty: 6 | Refills: 0
Start: 2019-05-21 | End: 2019-05-23

## 2019-05-21 RX ORDER — RIVAROXABAN 15 MG-20MG
1 KIT ORAL
Qty: 0 | Refills: 0 | DISCHARGE

## 2019-05-21 NOTE — ASU DISCHARGE PLAN (ADULT/PEDIATRIC) - ASU DC SPECIAL INSTRUCTIONSFT
Please follow up with Dr. Jeter this Thursday 5/23 for oates catheter removal.  Please take Bactrim for 3 days post operatively for UTI prophylaxis.  You may take tylenol as needed for pain.  Please contact the office at [phone] if you have an issues with dark thick blood in your urine.

## 2019-05-21 NOTE — ASU DISCHARGE PLAN (ADULT/PEDIATRIC) - CALL YOUR DOCTOR IF YOU HAVE ANY OF THE FOLLOWING:
Unable to urinate/Bleeding that does not stop/Pain not relieved by Medications/Fever greater than (need to indicate Fahrenheit or Celsius) Unable to urinate/Fever greater than (need to indicate Fahrenheit or Celsius)/Swelling that gets worse/Wound/Surgical Site with redness, or foul smelling discharge or pus/Bleeding that does not stop/Increased irritability or sluggishness/Nausea and vomiting that does not stop/Pain not relieved by Medications/Inability to tolerate liquids or foods

## 2019-05-21 NOTE — ASU DISCHARGE PLAN (ADULT/PEDIATRIC) - CARE PROVIDER_API CALL
Gemma Jeter)  Urology  [address] [place] [address], [place] [address]  Phone: [phone]  Fax: [phone]  Follow Up Time:

## 2019-05-24 LAB — SURGICAL PATHOLOGY STUDY: SIGNIFICANT CHANGE UP

## 2019-05-29 ENCOUNTER — OTHER (OUTPATIENT)
Age: 64
End: 2019-05-29

## 2019-05-29 ENCOUNTER — RESULT REVIEW (OUTPATIENT)
Age: 64
End: 2019-05-29

## 2019-06-04 ENCOUNTER — MEDICATION RENEWAL (OUTPATIENT)
Age: 64
End: 2019-06-04

## 2019-06-05 ENCOUNTER — OTHER (OUTPATIENT)
Age: 64
End: 2019-06-05

## 2019-06-13 ENCOUNTER — OUTPATIENT (OUTPATIENT)
Dept: OUTPATIENT SERVICES | Facility: HOSPITAL | Age: 64
LOS: 1 days | End: 2019-06-13

## 2019-06-13 VITALS
OXYGEN SATURATION: 98 % | DIASTOLIC BLOOD PRESSURE: 70 MMHG | HEART RATE: 68 BPM | TEMPERATURE: 97 F | RESPIRATION RATE: 14 BRPM | HEIGHT: 73 IN | SYSTOLIC BLOOD PRESSURE: 122 MMHG | WEIGHT: 220.9 LBS

## 2019-06-13 DIAGNOSIS — Z98.89 OTHER SPECIFIED POSTPROCEDURAL STATES: Chronic | ICD-10-CM

## 2019-06-13 DIAGNOSIS — C67.9 MALIGNANT NEOPLASM OF BLADDER, UNSPECIFIED: ICD-10-CM

## 2019-06-13 DIAGNOSIS — M95.9 ACQUIRED DEFORMITY OF MUSCULOSKELETAL SYSTEM, UNSPECIFIED: Chronic | ICD-10-CM

## 2019-06-13 DIAGNOSIS — Z86.79 PERSONAL HISTORY OF OTHER DISEASES OF THE CIRCULATORY SYSTEM: ICD-10-CM

## 2019-06-13 DIAGNOSIS — N32.89 OTHER SPECIFIED DISORDERS OF BLADDER: ICD-10-CM

## 2019-06-13 LAB
APPEARANCE UR: CLEAR — SIGNIFICANT CHANGE UP
BILIRUB UR-MCNC: NEGATIVE — SIGNIFICANT CHANGE UP
BLOOD UR QL VISUAL: SIGNIFICANT CHANGE UP
COLOR SPEC: SIGNIFICANT CHANGE UP
GLUCOSE UR-MCNC: NEGATIVE — SIGNIFICANT CHANGE UP
HCT VFR BLD CALC: 39.9 % — SIGNIFICANT CHANGE UP (ref 39–50)
HGB BLD-MCNC: 12.6 G/DL — LOW (ref 13–17)
KETONES UR-MCNC: NEGATIVE — SIGNIFICANT CHANGE UP
LEUKOCYTE ESTERASE UR-ACNC: NEGATIVE — SIGNIFICANT CHANGE UP
MCHC RBC-ENTMCNC: 27.3 PG — SIGNIFICANT CHANGE UP (ref 27–34)
MCHC RBC-ENTMCNC: 31.6 % — LOW (ref 32–36)
MCV RBC AUTO: 86.4 FL — SIGNIFICANT CHANGE UP (ref 80–100)
MUCOUS THREADS # UR AUTO: SIGNIFICANT CHANGE UP
NITRITE UR-MCNC: NEGATIVE — SIGNIFICANT CHANGE UP
NRBC # FLD: 0 K/UL — SIGNIFICANT CHANGE UP (ref 0–0)
PH UR: 7 — SIGNIFICANT CHANGE UP (ref 5–8)
PLATELET # BLD AUTO: 291 K/UL — SIGNIFICANT CHANGE UP (ref 150–400)
PMV BLD: 11.3 FL — SIGNIFICANT CHANGE UP (ref 7–13)
PROT UR-MCNC: NEGATIVE — SIGNIFICANT CHANGE UP
RBC # BLD: 4.62 M/UL — SIGNIFICANT CHANGE UP (ref 4.2–5.8)
RBC # FLD: 15 % — HIGH (ref 10.3–14.5)
RBC CASTS # UR COMP ASSIST: HIGH (ref 0–?)
SP GR SPEC: 1.01 — SIGNIFICANT CHANGE UP (ref 1–1.04)
UROBILINOGEN FLD QL: NORMAL — SIGNIFICANT CHANGE UP
WBC # BLD: 2.99 K/UL — LOW (ref 3.8–10.5)
WBC # FLD AUTO: 2.99 K/UL — LOW (ref 3.8–10.5)
WBC UR QL: SIGNIFICANT CHANGE UP (ref 0–?)

## 2019-06-13 RX ORDER — RIVAROXABAN 15 MG-20MG
1 KIT ORAL
Qty: 0 | Refills: 0 | DISCHARGE

## 2019-06-13 RX ORDER — HYDROXYZINE HCL 10 MG
1 TABLET ORAL
Qty: 0 | Refills: 0 | DISCHARGE

## 2019-06-13 NOTE — H&P PST ADULT - HEMATOLOGY/LYMPHATICS COMMENTS
Polycythemia vera.  Well controlled with current medicaiton Polycythemia vera.  Well controlled with current medication

## 2019-06-13 NOTE — H&P PST ADULT - NSICDXPASTSURGICALHX_GEN_ALL_CORE_FT
PAST SURGICAL HISTORY:  H/O partial nephrectomy right-12/17/12    Left varicocele     Melanoma in situ of back x1-1983, x2-06860    Mohs defect mohs procedure bilateral sides of nose with skin graft from clavicular areas 7/2016    S/P cystoscopy TURBT-05/2012    S/P cystoscopy 5/15/14 PAST SURGICAL HISTORY:  H/O partial nephrectomy right-12/17/12    Left varicocele     Melanoma in situ of back x1-1983, x2-80313    Mohs defect mohs procedure bilateral sides of nose with skin graft from clavicular areas 7/2016    S/P cystoscopy TURBT-05/2012    S/P cystoscopy 5/15/14 PAST SURGICAL HISTORY:  H/O partial nephrectomy right-12/17/12    Left varicocele     Melanoma in situ of back x1-1983, x2-23510    Mohs defect mohs procedure bilateral sides of nose with skin graft from clavicular areas 7/2016    S/P cystoscopy TURBT-05/2012    S/P cystoscopy 5/15/14

## 2019-06-13 NOTE — H&P PST ADULT - CARDIOVASCULAR COMMENTS
Hx afib on Xarelto.  Pt was advised by Cardio to stop 2 days before.  Last dose 6/17/19 pm dose Hx afib on Xarelto.  Pt was advised by Cardio to stop 2 days before.  I spoke with Dr. Lamas, Last dose 6/17/19 pm

## 2019-06-13 NOTE — H&P PST ADULT - NSICDXPROBLEM_GEN_ALL_CORE_FT
PROBLEM DIAGNOSES  Problem: Bladder mass  Assessment and Plan: cystoscopy, Bladder biopsy TURBT 6/20/19.  CBC BMP UA CS EKG  Pre-op instructions given and explained.   SHRAVAN precautions, OR booking informed    Problem: History of atrial fibrillation  Assessment and Plan: Pt on Xarelto.  I spoke with Dr. Lamas, PMD/ CArdio.  Last dose Xarelto 6/17/19 PM.  Pt aware

## 2019-06-13 NOTE — H&P PST ADULT - GENITOURINARY COMMENTS
62 y/o male with hx of increase in urinary frequency x several years.  Pt was scheduled for Cystoscopy with Botox on 5/21/19, bladder mass was discovered. Pt had TURBT at that time.  Pt reports he had post op imaging, with questionable finding. now scheduled for cystoscopy, Bladder biopsy TURBT 6/20/19.

## 2019-06-13 NOTE — H&P PST ADULT - NSICDXPASTMEDICALHX_GEN_ALL_CORE_FT
PAST MEDICAL HISTORY:  Basal cell carcinoma nose    BPH (benign prostatic hypertrophy)     Bulging cervical disc    Cataract of right eye     Degenerated intervertebral disc lumbar    H/O atrial fibrillation without current medication occurred post op 12/2012, pt. was on Cardizem x1 month afterwards    H/O polycythemia vera     HTN (hypertension)     Hyperlipidemia     Melanoma in situ of skin of trunk     Peripheral neuropathy "both feet"    Plantar fasciitis, bilateral     Psoriasis (a type of skin inflammation)     Renal cell carcinoma PAST MEDICAL HISTORY:  Basal cell carcinoma nose    BPH (benign prostatic hypertrophy)     Bulging cervical disc    Cataract of right eye     Degenerated intervertebral disc lumbar    H/O atrial fibrillation without current medication occurred post op 12/2012, pt. was on Cardizem x1 month afterwards    H/O polycythemia vera     HTN (hypertension)     Hyperlipidemia     Melanoma in situ of skin of trunk     Peripheral neuropathy "both feet"    Plantar fasciitis, bilateral     Polycythemia vera    Psoriasis (a type of skin inflammation)     Renal cell carcinoma

## 2019-06-13 NOTE — H&P PST ADULT - ASSESSMENT
64 y/o male with hx of increase in urinary frequency x several years.  Pt was scheduled for Cystoscopy with Botox on 5/21/19, bladder mass was discovered. Pt had TURBT at that time.  Pt reports he had post op imaging, with questionable finding.  Now scheduled for cystoscopy, Bladder biopsy TURBT 6/20/19.

## 2019-06-14 LAB — SPECIMEN SOURCE: SIGNIFICANT CHANGE UP

## 2019-06-15 LAB — BACTERIA UR CULT: SIGNIFICANT CHANGE UP

## 2019-06-19 ENCOUNTER — TRANSCRIPTION ENCOUNTER (OUTPATIENT)
Age: 64
End: 2019-06-19

## 2019-06-20 ENCOUNTER — OUTPATIENT (OUTPATIENT)
Dept: OUTPATIENT SERVICES | Facility: HOSPITAL | Age: 64
LOS: 1 days | Discharge: HOME | End: 2019-06-20
Payer: COMMERCIAL

## 2019-06-20 ENCOUNTER — RESULT REVIEW (OUTPATIENT)
Age: 64
End: 2019-06-20

## 2019-06-20 ENCOUNTER — APPOINTMENT (OUTPATIENT)
Dept: UROLOGY | Facility: AMBULATORY SURGERY CENTER | Age: 64
End: 2019-06-20

## 2019-06-20 VITALS
SYSTOLIC BLOOD PRESSURE: 109 MMHG | HEART RATE: 48 BPM | DIASTOLIC BLOOD PRESSURE: 65 MMHG | HEIGHT: 73 IN | RESPIRATION RATE: 18 BRPM | OXYGEN SATURATION: 99 % | WEIGHT: 220.9 LBS | TEMPERATURE: 98 F

## 2019-06-20 VITALS — HEART RATE: 52 BPM | OXYGEN SATURATION: 99 % | SYSTOLIC BLOOD PRESSURE: 115 MMHG | DIASTOLIC BLOOD PRESSURE: 72 MMHG

## 2019-06-20 DIAGNOSIS — Z98.89 OTHER SPECIFIED POSTPROCEDURAL STATES: Chronic | ICD-10-CM

## 2019-06-20 DIAGNOSIS — C67.9 MALIGNANT NEOPLASM OF BLADDER, UNSPECIFIED: ICD-10-CM

## 2019-06-20 DIAGNOSIS — M95.9 ACQUIRED DEFORMITY OF MUSCULOSKELETAL SYSTEM, UNSPECIFIED: Chronic | ICD-10-CM

## 2019-06-20 PROCEDURE — 88307 TISSUE EXAM BY PATHOLOGIST: CPT | Mod: 26

## 2019-06-20 PROCEDURE — 52235 CYSTOSCOPY AND TREATMENT: CPT

## 2019-06-20 NOTE — ASU DISCHARGE PLAN (ADULT/PEDIATRIC) - CARE PROVIDER_API CALL
Carter Love)  Urology  [address] [place] [address], [place] [address]  Phone: [phone]  Fax: [phone]  Follow Up Time:

## 2019-06-21 ENCOUNTER — INPATIENT (INPATIENT)
Facility: HOSPITAL | Age: 64
LOS: 2 days | Discharge: HOME | End: 2019-06-24
Attending: INTERNAL MEDICINE | Admitting: INTERNAL MEDICINE
Payer: COMMERCIAL

## 2019-06-21 ENCOUNTER — APPOINTMENT (OUTPATIENT)
Dept: UROLOGY | Facility: CLINIC | Age: 64
End: 2019-06-21
Payer: COMMERCIAL

## 2019-06-21 VITALS
SYSTOLIC BLOOD PRESSURE: 128 MMHG | RESPIRATION RATE: 17 BRPM | TEMPERATURE: 97.9 F | BODY MASS INDEX: 28.49 KG/M2 | DIASTOLIC BLOOD PRESSURE: 68 MMHG | HEART RATE: 60 BPM | HEIGHT: 73 IN | WEIGHT: 215 LBS

## 2019-06-21 VITALS
HEART RATE: 70 BPM | RESPIRATION RATE: 50 BRPM | DIASTOLIC BLOOD PRESSURE: 64 MMHG | TEMPERATURE: 99 F | OXYGEN SATURATION: 95 % | SYSTOLIC BLOOD PRESSURE: 125 MMHG

## 2019-06-21 DIAGNOSIS — M95.9 ACQUIRED DEFORMITY OF MUSCULOSKELETAL SYSTEM, UNSPECIFIED: Chronic | ICD-10-CM

## 2019-06-21 DIAGNOSIS — Z98.89 OTHER SPECIFIED POSTPROCEDURAL STATES: Chronic | ICD-10-CM

## 2019-06-21 LAB
ALBUMIN SERPL ELPH-MCNC: 3.8 G/DL — SIGNIFICANT CHANGE UP (ref 3.3–5)
ALP SERPL-CCNC: 41 U/L — SIGNIFICANT CHANGE UP (ref 40–120)
ALT FLD-CCNC: 18 U/L — SIGNIFICANT CHANGE UP (ref 4–41)
ANION GAP SERPL CALC-SCNC: 11 MMO/L — SIGNIFICANT CHANGE UP (ref 7–14)
ANION GAP SERPL CALC-SCNC: 16 MMO/L — HIGH (ref 7–14)
APTT BLD: 28.2 SEC — SIGNIFICANT CHANGE UP (ref 27.5–36.3)
AST SERPL-CCNC: 28 U/L — SIGNIFICANT CHANGE UP (ref 4–40)
BILIRUB SERPL-MCNC: 1.1 MG/DL — SIGNIFICANT CHANGE UP (ref 0.2–1.2)
BUN SERPL-MCNC: 17 MG/DL — SIGNIFICANT CHANGE UP (ref 7–23)
BUN SERPL-MCNC: 17 MG/DL — SIGNIFICANT CHANGE UP (ref 7–23)
CALCIUM SERPL-MCNC: 8.6 MG/DL — SIGNIFICANT CHANGE UP (ref 8.4–10.5)
CALCIUM SERPL-MCNC: 9.1 MG/DL — SIGNIFICANT CHANGE UP (ref 8.4–10.5)
CHLORIDE SERPL-SCNC: 85 MMOL/L — LOW (ref 98–107)
CHLORIDE SERPL-SCNC: 86 MMOL/L — LOW (ref 98–107)
CHLORIDE UR-SCNC: < 20 MMOL/L — SIGNIFICANT CHANGE UP
CO2 SERPL-SCNC: 16 MMOL/L — LOW (ref 22–31)
CO2 SERPL-SCNC: 17 MMOL/L — LOW (ref 22–31)
CREAT SERPL-MCNC: 0.95 MG/DL — SIGNIFICANT CHANGE UP (ref 0.5–1.3)
CREAT SERPL-MCNC: 1.01 MG/DL — SIGNIFICANT CHANGE UP (ref 0.5–1.3)
D DIMER BLD IA.RAPID-MCNC: 224 NG/ML — SIGNIFICANT CHANGE UP
GLUCOSE SERPL-MCNC: 125 MG/DL — HIGH (ref 70–99)
GLUCOSE SERPL-MCNC: 144 MG/DL — HIGH (ref 70–99)
HCT VFR BLD CALC: 29.4 % — LOW (ref 39–50)
HGB BLD-MCNC: 10 G/DL — LOW (ref 13–17)
INR BLD: 1.65 — HIGH (ref 0.88–1.17)
MAGNESIUM SERPL-MCNC: 1.5 MG/DL — LOW (ref 1.6–2.6)
MCHC RBC-ENTMCNC: 27.5 PG — SIGNIFICANT CHANGE UP (ref 27–34)
MCHC RBC-ENTMCNC: 34 % — SIGNIFICANT CHANGE UP (ref 32–36)
MCV RBC AUTO: 81 FL — SIGNIFICANT CHANGE UP (ref 80–100)
NRBC # FLD: 0 K/UL — SIGNIFICANT CHANGE UP (ref 0–0)
OSMOLALITY UR: 337 MOSMO/KG — SIGNIFICANT CHANGE UP (ref 50–1200)
PHOSPHATE SERPL-MCNC: 3.8 MG/DL — SIGNIFICANT CHANGE UP (ref 2.5–4.5)
PLATELET # BLD AUTO: 248 K/UL — SIGNIFICANT CHANGE UP (ref 150–400)
PMV BLD: 11.3 FL — SIGNIFICANT CHANGE UP (ref 7–13)
POTASSIUM SERPL-MCNC: 3.4 MMOL/L — LOW (ref 3.5–5.3)
POTASSIUM SERPL-MCNC: 5.3 MMOL/L — SIGNIFICANT CHANGE UP (ref 3.5–5.3)
POTASSIUM SERPL-SCNC: 3.4 MMOL/L — LOW (ref 3.5–5.3)
POTASSIUM SERPL-SCNC: 5.3 MMOL/L — SIGNIFICANT CHANGE UP (ref 3.5–5.3)
POTASSIUM UR-SCNC: 27.9 MMOL/L — SIGNIFICANT CHANGE UP
PROT SERPL-MCNC: 5.8 G/DL — LOW (ref 6–8.3)
PROTHROM AB SERPL-ACNC: 18.6 SEC — HIGH (ref 9.8–13.1)
RBC # BLD: 3.63 M/UL — LOW (ref 4.2–5.8)
RBC # FLD: 13.9 % — SIGNIFICANT CHANGE UP (ref 10.3–14.5)
SODIUM SERPL-SCNC: 114 MMOL/L — CRITICAL LOW (ref 135–145)
SODIUM SERPL-SCNC: 117 MMOL/L — CRITICAL LOW (ref 135–145)
SODIUM UR-SCNC: 25 MMOL/L — SIGNIFICANT CHANGE UP
WBC # BLD: 8.23 K/UL — SIGNIFICANT CHANGE UP (ref 3.8–10.5)
WBC # FLD AUTO: 8.23 K/UL — SIGNIFICANT CHANGE UP (ref 3.8–10.5)

## 2019-06-21 PROCEDURE — 71045 X-RAY EXAM CHEST 1 VIEW: CPT | Mod: 26

## 2019-06-21 PROCEDURE — 70450 CT HEAD/BRAIN W/O DYE: CPT | Mod: 26

## 2019-06-21 PROCEDURE — 99213 OFFICE O/P EST LOW 20 MIN: CPT

## 2019-06-21 RX ORDER — SODIUM CHLORIDE 9 MG/ML
1000 INJECTION INTRAMUSCULAR; INTRAVENOUS; SUBCUTANEOUS ONCE
Refills: 0 | Status: COMPLETED | OUTPATIENT
Start: 2019-06-21 | End: 2019-06-21

## 2019-06-21 RX ORDER — FAMOTIDINE 10 MG/ML
20 INJECTION INTRAVENOUS ONCE
Refills: 0 | Status: COMPLETED | OUTPATIENT
Start: 2019-06-21 | End: 2019-06-21

## 2019-06-21 RX ADMIN — FAMOTIDINE 20 MILLIGRAM(S): 10 INJECTION INTRAVENOUS at 20:54

## 2019-06-21 RX ADMIN — Medication 0.5 MILLIGRAM(S): at 18:53

## 2019-06-21 RX ADMIN — SODIUM CHLORIDE 333.33 MILLILITER(S): 9 INJECTION INTRAMUSCULAR; INTRAVENOUS; SUBCUTANEOUS at 23:01

## 2019-06-21 NOTE — ED PROVIDER NOTE - CLINICAL SUMMARY MEDICAL DECISION MAKING FREE TEXT BOX
64yM w/pmhx afib on xarelto, polycythemia vera, HTN, HLD, hx renal cell carcinoma, recently dx bladder cancer yesterday? presenting with hyperventilation. Pt has oates in place. Will check cbc/cmp/d-dimer. Will trial ativan. CXR 64yM w/pmhx afib on xarelto, polycythemia vera, HTN, HLD, hx renal cell carcinoma, recently dx bladder cancer yesterday? presenting with hyperventilation. Pt has oates in place. Will check cbc/cmp/d-dimer. Will trial ativan. CXR, CT head. Will consult neuro given expressive aphasia.

## 2019-06-21 NOTE — ED ADULT NURSE NOTE - OBJECTIVE STATEMENT
Aleshia RN: Patient presents for hyperventilation s/p urologic biopsy today and indwelling oates placed for retention. Patient denies CP/ weakness. Patient was anxious, tachypneic and expressing concern about his condition. Currently respirations have improved and able to answer questions wife at bedside soothing patient. Placed on cardiac monitor, vitals stable sinus elaine on monitor. MD and EDT at bedside performing EKG. Patient appears pale on exam, has oates draining to gravity bright red urine. #20g IV placed rt forearm. Plan is labwork, ekg, ativan and reassess. Will endorse to Primary RN.

## 2019-06-21 NOTE — PHYSICAL EXAM
[General Appearance - In No Acute Distress] : no acute distress [Abdomen Soft] : soft [Costovertebral Angle Tenderness] : no ~M costovertebral angle tenderness [] : no respiratory distress [Respiration, Rhythm And Depth] : normal respiratory rhythm and effort [Exaggerated Use Of Accessory Muscles For Inspiration] : no accessory muscle use [Oriented To Time, Place, And Person] : oriented to person, place, and time [Affect] : the affect was normal [Mood] : the mood was normal [Not Anxious] : not anxious [FreeTextEntry1] : pain with palpation of bladder

## 2019-06-21 NOTE — CONSULT NOTE ADULT - SUBJECTIVE AND OBJECTIVE BOX
HPI: This is a 64 year old male with a medical history significant for Afib on Xarelto, polycythemia vera, HTN, HLD, hx renal cell carcinoma s/p right partial nephrectomy 2012, hx bladder cancer presenting to the ED with hyperventilation and expressive aphasia, Urology consult called for gross hematuria in setting of recent TURBT.  Patient underwent repeat TURBT with Dr. Love on 6/20 for concern for residual tumor.  He developed hematuria with clots and retention earlier today, 6/21 and was seen in the office.  A oates was placed with return of hematuria, and this was irrigated with return of pink clear urine.  He was instructed to stay hydrated, and return for TOV on Monday 6/24.  He resumed the Xarelto the night of the TURBT on 6/20, and held it today, and was told to resume tomorrow.  A few hours later at home he developed expressive aphasia, mild confusion, and hyperventilation and was taken to the ED.      PAST MEDICAL & SURGICAL HISTORY:  Paroxysmal Afib on Xarelto  Polycythemia vera  Hyperlipidemia  HTN (hypertension)  BPH  Psoriasis  Bladder ca: High grade urothelial noninvasive tumor  Renal cell carcinoma, h/o right partial nephrectomy 2012  Overactive bladder  Peripheral neuropathy  Degenerative disc disease  Basal cell carcinoma: nose, s/p Mohs procedure 2016  Melanoma in situ of skin of back: x1-1983, [id number]  S/P cystoscopy: 5/15/14,  TURBT 5/2012  Left varicocele  Cataract of right eye  Plantar fasciitis, bilateral    MEDICATIONS:  Home Medications:  Co-Q10: 1 cap oral dialy. last dose 6/12/19 (20 Jun 2019 16:04)  dilTIAZem 240 mg/24 hours oral capsule, extended release: 1 cap(s) orally once a day (20 Jun 2019 16:04)  hydrOXYzine hydrochloride 10 mg oral tablet: 3 tab(s) orally 2 times a day (20 Jun 2019 16:04)  irbesartan 75 mg oral tablet: 1 tab(s) orally once a day (20 Jun 2019 16:04)  Jakafi 15 mg oral tablet: 1 tab(s) orally 2 times a day (20 Jun 2019 16:04)  Multiple Vitamins oral tablet: 1 tab(s) orally once a day.last dose 6/12/19 (20 Jun 2019 16:04)  Omega-3 oral capsule: 1 tab oral daily. last dose 6/12/19 (20 Jun 2019 16:04)  Vitamin D3: 1 tab oral daily. (20 Jun 2019 16:04)  Xarelto 20 mg oral tablet: 1 tab(s) orally once a day (in the evening)  last dose 6/17/19 pm (20 Jun 2019 16:04)    FAMILY HISTORY:  Family history of diabetes mellitus (Sibling)  Family history of kidney disease (Mother)  Family history of CHF (congestive heart failure) (Father, Mother)    Allergies  Cipro (Rash)    REVIEW OF SYSTEMS: Otherwise negative as stated in HPI    Vital Signs Last 24 Hrs  T(C): 37 (21 Jun 2019 18:07), Max: 37 (21 Jun 2019 18:07)  T(F): 98.6 (21 Jun 2019 18:07), Max: 98.6 (21 Jun 2019 18:07)  HR: 57 (21 Jun 2019 21:00) (55 - 70)  BP: 122/68 (21 Jun 2019 21:00) (121/69 - 125/64)  BP(mean): --  RR: 18 (21 Jun 2019 21:00) (18 - 50)  SpO2: 100% (21 Jun 2019 21:00) (95% - 100%)    PHYSICAL EXAM:  General: awake and alert, but slow to respond, requiring orienting by family    Respiratory and Thorax: breathing comfortably, in no distress  	  Cardiovascular: Regular    Gastrointestinal: soft, non tender    Genitourinary: Oates in place, draining well, bright red, no clots present.                          	  Extremities:  upper and lower edema  	  LABS:                        10.0   8.23  )-----------( 248      ( 21 Jun 2019 18:51 )             29.4     06-21    117<LL>  |  85<L>  |  17  ----------------------------<  144<H>  3.4<L>   |  16<L>  |  1.01    Ca    9.1      21 Jun 2019 18:51    TPro  5.8<L>  /  Alb  3.8  /  TBili  1.1  /  DBili  x   /  AST  28  /  ALT  18  /  AlkPhos  41  06-21    PT/INR - ( 21 Jun 2019 18:51 )   PT: 18.6 SEC;   INR: 1.65          PTT - ( 21 Jun 2019 18:51 )  PTT:28.2 SEC    RADIOLOGY:

## 2019-06-21 NOTE — ED PROVIDER NOTE - NS_EDPROVIDERDISPOUSERTYPE_ED_A_ED
Telephone Encounter by Mariola Scott RMA at 02/03/17 09:25 AM     Author:  Mariola Scott RMA Service:  (none) Author Type:  Medical Assistant     Filed:  02/03/17 09:25 AM Encounter Date:  1/31/2017 Status:  Signed     :  Mariola Scott RMA (Medical Assistant)            Left message on answering machine to call back.  Colon letter has been mailed to the pt.  Electronically Signed by:    MICHELE Conner , 2/3/2017[LR1.1T]          Revision History        User Key Date/Time User Provider Type Action    > LR1.1 02/03/17 09:25 AM Mariola Scott RMA Medical Assistant Sign    T - Template             Attending Attestation (For Attendings USE Only)...

## 2019-06-21 NOTE — CONSULT NOTE ADULT - ASSESSMENT
64 year old male with a medical history significant for Afib on Xarelto, polycythemia vera, HTN, HLD, hx renal cell carcinoma s/p right partial nephrectomy 2012, hx bladder cancer presenting to the ED with hyperventilation and expressive aphasia, Urology consult called for gross hematuria in setting of recent TURBT.     -No Urologic Intervention required at this time.  -Patient would benefit from a medicine admission given symptomatic hyponatremia.  -Monitor I+Os, monitor hematuria for clots/retention.  -Urology will follow along, p [id number]

## 2019-06-21 NOTE — ED ADULT NURSE REASSESSMENT NOTE - NS ED NURSE REASSESS COMMENT FT1
pt vs as charted. pt reports feeling better since first arrival to ED. MICU resident currently at bedside for eval 2/2 hyponatremia. pt sinus bradycardic to 55 on cardiac monitor. pt leg bag exchanged for bedside drainage bag, currently draining lighter, pink tinged urine. MD aware. will continue to monitor.

## 2019-06-21 NOTE — ED PROVIDER NOTE - PHYSICAL EXAMINATION
pt is hyperventilating  pt appears pale pt is hyperventilating, appears pale  oates in place draining, urine is red pt is hyperventilating, appears pale  oates in place draining, urine is red  Neuro: A&Ox3, PERRL, pt with expressive aphasia, normal facial expressions, sensation intact and equal b/l, strength 5/5 in all extremities, normal finger to nose

## 2019-06-21 NOTE — HISTORY OF PRESENT ILLNESS
[FreeTextEntry1] : This is a 64 year old gentleman who underwent a TURBT yesterday with Dr. Love.  \par \par Patient was able to void after the surgery but then overnight started having the feeling he needed to urinate but was unable to and was having pain in the tip of his penis.  As per the patient and his wife he was urinating small amounts with tiny clots coming out.  \par Denies any fever, chills,, nausea or vomiting.  \par He currently takes Xarelto for a-fib and took his dose last night after surgery.

## 2019-06-21 NOTE — ED PROVIDER NOTE - PSH
H/O partial nephrectomy  right-12/17/12  Left varicocele    Melanoma in situ of back  x1-1983, [id number]  Mohs defect  mohs procedure bilateral sides of nose with skin graft from clavicular areas 7/2016  S/P cystoscopy  5/15/14  S/P cystoscopy  TURBT-05/2012

## 2019-06-21 NOTE — ED ADULT NURSE NOTE - NSIMPLEMENTINTERV_GEN_ALL_ED
Implemented All Universal Safety Interventions:  [place] to call system. Call bell, personal items and telephone within reach. Instruct patient to call for assistance. Room bathroom lighting operational. Non-slip footwear when patient is off stretcher. Physically safe environment: no spills, clutter or unnecessary equipment. Stretcher in lowest position, wheels locked, appropriate side rails in place.

## 2019-06-21 NOTE — ED PROVIDER NOTE - PMH
Basal cell carcinoma  nose  BPH (benign prostatic hypertrophy)    Bulging  cervical disc  Cataract of right eye    Degenerated intervertebral disc  lumbar  H/O atrial fibrillation without current medication  occurred post op 12/2012, pt. was on Cardizem x1 month afterwards  H/O polycythemia vera    HTN (hypertension)    Hyperlipidemia    Melanoma in situ of skin of trunk    Peripheral neuropathy  "both feet"  Plantar fasciitis, bilateral    Polycythemia  vera  Psoriasis (a type of skin inflammation)    Renal cell carcinoma

## 2019-06-21 NOTE — ED PROVIDER NOTE - ATTENDING CONTRIBUTION TO CARE
Dr Bloch, ATTENDING MD-  I performed a face to face bedside interview with patient regarding history of present illness, review of symptoms and past medical history. I completed an independent physical exam.  I have discussed patient's plan of care with PA.   Documentation as above in the note.  Patient examined, expressive aphasia @ hyperventilation, PERRL mild nystagmus, full eom 2-12 intact, mild diffuse extrem tremor, hyperventilating to 40-60. neck supple, mild diffuse edema, heart sounds nml lungs clear, abd soft nontender, oates draining red urine,nonfocal neuro exam.

## 2019-06-21 NOTE — ED ADULT NURSE REASSESSMENT NOTE - NS ED NURSE REASSESS COMMENT FT1
pt vs as charted. pt now on RA, sating 100%, pt appears less anxious, no longer hyperventilating. 300cc dark red urine drained from leg bag. pt and family educated on plan of care. awaiting additional labs and CT. pt offers no new complaints at this time. will continue to monitor.

## 2019-06-21 NOTE — ASSESSMENT
[FreeTextEntry1] : Urinary Retention\par -Bladder scan was performed to asses volume, 566 cc was noted\par -18 fr. oates catheter was placed without any difficulty\par -patient was irrigated with 500 cc Sterile water till uirne return clear light pink\par -Reviewed Oates care with patient and wife\par -All questions answered and supplies given\par -Trial of Tamsulosin 0.4 mg\par \par As per discussion with patient will hold dose of Xarelto and resume home dose tomorrow 6/22/19.  Patient and wife verbalize understanding of plan.  Patient instructed to call office or go to the ER if he develops fever, no urine is seen draining into urinary bag for greater than 4 hours, or if he starts passing large clots.\par \par Follow up on Monday for trial void.

## 2019-06-22 DIAGNOSIS — D75.1 SECONDARY POLYCYTHEMIA: ICD-10-CM

## 2019-06-22 DIAGNOSIS — E87.1 HYPO-OSMOLALITY AND HYPONATREMIA: ICD-10-CM

## 2019-06-22 DIAGNOSIS — I10 ESSENTIAL (PRIMARY) HYPERTENSION: ICD-10-CM

## 2019-06-22 DIAGNOSIS — Z29.9 ENCOUNTER FOR PROPHYLACTIC MEASURES, UNSPECIFIED: ICD-10-CM

## 2019-06-22 DIAGNOSIS — I48.91 UNSPECIFIED ATRIAL FIBRILLATION: ICD-10-CM

## 2019-06-22 DIAGNOSIS — G93.41 METABOLIC ENCEPHALOPATHY: ICD-10-CM

## 2019-06-22 DIAGNOSIS — C67.9 MALIGNANT NEOPLASM OF BLADDER, UNSPECIFIED: ICD-10-CM

## 2019-06-22 DIAGNOSIS — C64.9 MALIGNANT NEOPLASM OF UNSPECIFIED KIDNEY, EXCEPT RENAL PELVIS: ICD-10-CM

## 2019-06-22 DIAGNOSIS — D62 ACUTE POSTHEMORRHAGIC ANEMIA: ICD-10-CM

## 2019-06-22 LAB
ANION GAP SERPL CALC-SCNC: 10 MMO/L — SIGNIFICANT CHANGE UP (ref 7–14)
ANION GAP SERPL CALC-SCNC: 14 MMO/L — SIGNIFICANT CHANGE UP (ref 7–14)
ANION GAP SERPL CALC-SCNC: 15 MMO/L — HIGH (ref 7–14)
ANION GAP SERPL CALC-SCNC: 17 MMO/L — HIGH (ref 7–14)
ANION GAP SERPL CALC-SCNC: 8 MMO/L — SIGNIFICANT CHANGE UP (ref 7–14)
APPEARANCE UR: CLEAR — SIGNIFICANT CHANGE UP
BACTERIA # UR AUTO: NEGATIVE — SIGNIFICANT CHANGE UP
BASOPHILS # BLD AUTO: 0 K/UL — SIGNIFICANT CHANGE UP (ref 0–0.2)
BASOPHILS NFR BLD AUTO: 0 % — SIGNIFICANT CHANGE UP (ref 0–2)
BILIRUB UR-MCNC: NEGATIVE — SIGNIFICANT CHANGE UP
BLOOD UR QL VISUAL: HIGH
BUN SERPL-MCNC: 16 MG/DL — SIGNIFICANT CHANGE UP (ref 7–23)
BUN SERPL-MCNC: 17 MG/DL — SIGNIFICANT CHANGE UP (ref 7–23)
BUN SERPL-MCNC: 18 MG/DL — SIGNIFICANT CHANGE UP (ref 7–23)
BUN SERPL-MCNC: 18 MG/DL — SIGNIFICANT CHANGE UP (ref 7–23)
BUN SERPL-MCNC: 22 MG/DL — SIGNIFICANT CHANGE UP (ref 7–23)
CALCIUM SERPL-MCNC: 8.3 MG/DL — LOW (ref 8.4–10.5)
CALCIUM SERPL-MCNC: 8.8 MG/DL — SIGNIFICANT CHANGE UP (ref 8.4–10.5)
CALCIUM SERPL-MCNC: 8.8 MG/DL — SIGNIFICANT CHANGE UP (ref 8.4–10.5)
CALCIUM SERPL-MCNC: 9.1 MG/DL — SIGNIFICANT CHANGE UP (ref 8.4–10.5)
CALCIUM SERPL-MCNC: 9.7 MG/DL — SIGNIFICANT CHANGE UP (ref 8.4–10.5)
CHLORIDE SERPL-SCNC: 108 MMOL/L — HIGH (ref 98–107)
CHLORIDE SERPL-SCNC: 110 MMOL/L — HIGH (ref 98–107)
CHLORIDE SERPL-SCNC: 91 MMOL/L — LOW (ref 98–107)
CHLORIDE SERPL-SCNC: 97 MMOL/L — LOW (ref 98–107)
CHLORIDE SERPL-SCNC: 97 MMOL/L — LOW (ref 98–107)
CO2 SERPL-SCNC: 16 MMOL/L — LOW (ref 22–31)
CO2 SERPL-SCNC: 17 MMOL/L — LOW (ref 22–31)
CO2 SERPL-SCNC: 18 MMOL/L — LOW (ref 22–31)
CO2 SERPL-SCNC: 20 MMOL/L — LOW (ref 22–31)
CO2 SERPL-SCNC: 21 MMOL/L — LOW (ref 22–31)
COLOR SPEC: COLORLESS — SIGNIFICANT CHANGE UP
CORTIS SERPL-MCNC: 1.8 UG/DL — LOW (ref 2.7–18.4)
CREAT SERPL-MCNC: 0.98 MG/DL — SIGNIFICANT CHANGE UP (ref 0.5–1.3)
CREAT SERPL-MCNC: 1.02 MG/DL — SIGNIFICANT CHANGE UP (ref 0.5–1.3)
CREAT SERPL-MCNC: 1.03 MG/DL — SIGNIFICANT CHANGE UP (ref 0.5–1.3)
CREAT SERPL-MCNC: 1.09 MG/DL — SIGNIFICANT CHANGE UP (ref 0.5–1.3)
CREAT SERPL-MCNC: 1.12 MG/DL — SIGNIFICANT CHANGE UP (ref 0.5–1.3)
EOSINOPHIL # BLD AUTO: 0 K/UL — SIGNIFICANT CHANGE UP (ref 0–0.5)
EOSINOPHIL NFR BLD AUTO: 0 % — SIGNIFICANT CHANGE UP (ref 0–6)
GLUCOSE SERPL-MCNC: 110 MG/DL — HIGH (ref 70–99)
GLUCOSE SERPL-MCNC: 112 MG/DL — HIGH (ref 70–99)
GLUCOSE SERPL-MCNC: 124 MG/DL — HIGH (ref 70–99)
GLUCOSE SERPL-MCNC: 125 MG/DL — HIGH (ref 70–99)
GLUCOSE SERPL-MCNC: 96 MG/DL — SIGNIFICANT CHANGE UP (ref 70–99)
GLUCOSE UR-MCNC: NEGATIVE — SIGNIFICANT CHANGE UP
HCT VFR BLD CALC: 26 % — LOW (ref 39–50)
HCT VFR BLD CALC: 32.7 % — LOW (ref 39–50)
HCT VFR BLD CALC: 34 % — LOW (ref 39–50)
HGB BLD-MCNC: 10.6 G/DL — LOW (ref 13–17)
HGB BLD-MCNC: 11.1 G/DL — LOW (ref 13–17)
HGB BLD-MCNC: 8.8 G/DL — LOW (ref 13–17)
HYALINE CASTS # UR AUTO: NEGATIVE — SIGNIFICANT CHANGE UP
IMM GRANULOCYTES NFR BLD AUTO: 0.5 % — SIGNIFICANT CHANGE UP (ref 0–1.5)
KETONES UR-MCNC: NEGATIVE — SIGNIFICANT CHANGE UP
LEUKOCYTE ESTERASE UR-ACNC: SIGNIFICANT CHANGE UP
LYMPHOCYTES # BLD AUTO: 0.25 K/UL — LOW (ref 1–3.3)
LYMPHOCYTES # BLD AUTO: 5.8 % — LOW (ref 13–44)
MAGNESIUM SERPL-MCNC: 1.6 MG/DL — SIGNIFICANT CHANGE UP (ref 1.6–2.6)
MAGNESIUM SERPL-MCNC: 2 MG/DL — SIGNIFICANT CHANGE UP (ref 1.6–2.6)
MAGNESIUM SERPL-MCNC: 2.1 MG/DL — SIGNIFICANT CHANGE UP (ref 1.6–2.6)
MCHC RBC-ENTMCNC: 27.3 PG — SIGNIFICANT CHANGE UP (ref 27–34)
MCHC RBC-ENTMCNC: 27.4 PG — SIGNIFICANT CHANGE UP (ref 27–34)
MCHC RBC-ENTMCNC: 27.9 PG — SIGNIFICANT CHANGE UP (ref 27–34)
MCHC RBC-ENTMCNC: 32.4 % — SIGNIFICANT CHANGE UP (ref 32–36)
MCHC RBC-ENTMCNC: 32.6 % — SIGNIFICANT CHANGE UP (ref 32–36)
MCHC RBC-ENTMCNC: 33.8 % — SIGNIFICANT CHANGE UP (ref 32–36)
MCV RBC AUTO: 80.7 FL — SIGNIFICANT CHANGE UP (ref 80–100)
MCV RBC AUTO: 84 FL — SIGNIFICANT CHANGE UP (ref 80–100)
MCV RBC AUTO: 86.1 FL — SIGNIFICANT CHANGE UP (ref 80–100)
MONOCYTES # BLD AUTO: 0.37 K/UL — SIGNIFICANT CHANGE UP (ref 0–0.9)
MONOCYTES NFR BLD AUTO: 8.6 % — SIGNIFICANT CHANGE UP (ref 2–14)
NEUTROPHILS # BLD AUTO: 3.67 K/UL — SIGNIFICANT CHANGE UP (ref 1.8–7.4)
NEUTROPHILS NFR BLD AUTO: 85.1 % — HIGH (ref 43–77)
NITRITE UR-MCNC: NEGATIVE — SIGNIFICANT CHANGE UP
NRBC # FLD: 0 K/UL — SIGNIFICANT CHANGE UP (ref 0–0)
NRBC # FLD: 0 K/UL — SIGNIFICANT CHANGE UP (ref 0–0)
NRBC # FLD: 0.04 K/UL — SIGNIFICANT CHANGE UP (ref 0–0)
OSMOLALITY SERPL: 263 MOSMO/KG — LOW (ref 275–295)
OSMOLALITY SERPL: 295 MOSMO/KG — SIGNIFICANT CHANGE UP (ref 275–295)
PH UR: 6.5 — SIGNIFICANT CHANGE UP (ref 5–8)
PHOSPHATE SERPL-MCNC: 2.3 MG/DL — LOW (ref 2.5–4.5)
PHOSPHATE SERPL-MCNC: 2.7 MG/DL — SIGNIFICANT CHANGE UP (ref 2.5–4.5)
PHOSPHATE SERPL-MCNC: 3.7 MG/DL — SIGNIFICANT CHANGE UP (ref 2.5–4.5)
PHOSPHATE SERPL-MCNC: 3.8 MG/DL — SIGNIFICANT CHANGE UP (ref 2.5–4.5)
PHOSPHATE SERPL-MCNC: 4.1 MG/DL — SIGNIFICANT CHANGE UP (ref 2.5–4.5)
PLATELET # BLD AUTO: 150 K/UL — SIGNIFICANT CHANGE UP (ref 150–400)
PLATELET # BLD AUTO: 170 K/UL — SIGNIFICANT CHANGE UP (ref 150–400)
PLATELET # BLD AUTO: 182 K/UL — SIGNIFICANT CHANGE UP (ref 150–400)
PMV BLD: 11.1 FL — SIGNIFICANT CHANGE UP (ref 7–13)
PMV BLD: 11.7 FL — SIGNIFICANT CHANGE UP (ref 7–13)
PMV BLD: 11.8 FL — SIGNIFICANT CHANGE UP (ref 7–13)
POTASSIUM SERPL-MCNC: 3.8 MMOL/L — SIGNIFICANT CHANGE UP (ref 3.5–5.3)
POTASSIUM SERPL-MCNC: 4 MMOL/L — SIGNIFICANT CHANGE UP (ref 3.5–5.3)
POTASSIUM SERPL-MCNC: 4.1 MMOL/L — SIGNIFICANT CHANGE UP (ref 3.5–5.3)
POTASSIUM SERPL-MCNC: 4.1 MMOL/L — SIGNIFICANT CHANGE UP (ref 3.5–5.3)
POTASSIUM SERPL-MCNC: 4.6 MMOL/L — SIGNIFICANT CHANGE UP (ref 3.5–5.3)
POTASSIUM SERPL-SCNC: 3.8 MMOL/L — SIGNIFICANT CHANGE UP (ref 3.5–5.3)
POTASSIUM SERPL-SCNC: 4 MMOL/L — SIGNIFICANT CHANGE UP (ref 3.5–5.3)
POTASSIUM SERPL-SCNC: 4.1 MMOL/L — SIGNIFICANT CHANGE UP (ref 3.5–5.3)
POTASSIUM SERPL-SCNC: 4.1 MMOL/L — SIGNIFICANT CHANGE UP (ref 3.5–5.3)
POTASSIUM SERPL-SCNC: 4.6 MMOL/L — SIGNIFICANT CHANGE UP (ref 3.5–5.3)
PROT UR-MCNC: 50 — SIGNIFICANT CHANGE UP
RBC # BLD: 3.22 M/UL — LOW (ref 4.2–5.8)
RBC # BLD: 3.8 M/UL — LOW (ref 4.2–5.8)
RBC # BLD: 4.05 M/UL — LOW (ref 4.2–5.8)
RBC # FLD: 13.8 % — SIGNIFICANT CHANGE UP (ref 10.3–14.5)
RBC # FLD: 14.6 % — HIGH (ref 10.3–14.5)
RBC # FLD: 14.6 % — HIGH (ref 10.3–14.5)
RBC CASTS # UR COMP ASSIST: SIGNIFICANT CHANGE UP (ref 0–?)
SODIUM SERPL-SCNC: 122 MMOL/L — LOW (ref 135–145)
SODIUM SERPL-SCNC: 130 MMOL/L — LOW (ref 135–145)
SODIUM SERPL-SCNC: 130 MMOL/L — LOW (ref 135–145)
SODIUM SERPL-SCNC: 138 MMOL/L — SIGNIFICANT CHANGE UP (ref 135–145)
SODIUM SERPL-SCNC: 139 MMOL/L — SIGNIFICANT CHANGE UP (ref 135–145)
SP GR SPEC: 1 — LOW (ref 1–1.04)
SQUAMOUS # UR AUTO: SIGNIFICANT CHANGE UP
TSH SERPL-MCNC: 1.97 UIU/ML — SIGNIFICANT CHANGE UP (ref 0.27–4.2)
URATE SERPL-MCNC: 3.5 MG/DL — SIGNIFICANT CHANGE UP (ref 3.4–8.8)
URATE UR-MCNC: 17.7 MG/DL — SIGNIFICANT CHANGE UP
UROBILINOGEN FLD QL: NORMAL — SIGNIFICANT CHANGE UP
WBC # BLD: 4.24 K/UL — SIGNIFICANT CHANGE UP (ref 3.8–10.5)
WBC # BLD: 4.31 K/UL — SIGNIFICANT CHANGE UP (ref 3.8–10.5)
WBC # BLD: 4.59 K/UL — SIGNIFICANT CHANGE UP (ref 3.8–10.5)
WBC # FLD AUTO: 4.24 K/UL — SIGNIFICANT CHANGE UP (ref 3.8–10.5)
WBC # FLD AUTO: 4.31 K/UL — SIGNIFICANT CHANGE UP (ref 3.8–10.5)
WBC # FLD AUTO: 4.59 K/UL — SIGNIFICANT CHANGE UP (ref 3.8–10.5)
WBC UR QL: HIGH (ref 0–?)

## 2019-06-22 PROCEDURE — 93010 ELECTROCARDIOGRAM REPORT: CPT

## 2019-06-22 PROCEDURE — 99222 1ST HOSP IP/OBS MODERATE 55: CPT | Mod: GC

## 2019-06-22 PROCEDURE — 99223 1ST HOSP IP/OBS HIGH 75: CPT | Mod: GC

## 2019-06-22 RX ORDER — MAGNESIUM SULFATE 500 MG/ML
1 VIAL (ML) INJECTION ONCE
Refills: 0 | Status: COMPLETED | OUTPATIENT
Start: 2019-06-22 | End: 2019-06-22

## 2019-06-22 RX ORDER — SODIUM CHLORIDE 9 MG/ML
1000 INJECTION, SOLUTION INTRAVENOUS
Refills: 0 | Status: DISCONTINUED | OUTPATIENT
Start: 2019-06-22 | End: 2019-06-23

## 2019-06-22 RX ORDER — HYDROXYZINE HCL 10 MG
25 TABLET ORAL
Refills: 0 | Status: DISCONTINUED | OUTPATIENT
Start: 2019-06-22 | End: 2019-06-24

## 2019-06-22 RX ORDER — RUXOLITINIB 25 MG/1
15 TABLET ORAL ONCE
Refills: 0 | Status: DISCONTINUED | OUTPATIENT
Start: 2019-06-22 | End: 2019-06-22

## 2019-06-22 RX ORDER — RIVAROXABAN 15 MG-20MG
20 KIT ORAL
Refills: 0 | Status: DISCONTINUED | OUTPATIENT
Start: 2019-06-22 | End: 2019-06-24

## 2019-06-22 RX ORDER — CALCIUM CARBONATE 500(1250)
1 TABLET ORAL ONCE
Refills: 0 | Status: COMPLETED | OUTPATIENT
Start: 2019-06-22 | End: 2019-06-22

## 2019-06-22 RX ORDER — LANOLIN ALCOHOL/MO/W.PET/CERES
3 CREAM (GRAM) TOPICAL AT BEDTIME
Refills: 0 | Status: DISCONTINUED | OUTPATIENT
Start: 2019-06-22 | End: 2019-06-24

## 2019-06-22 RX ORDER — ONDANSETRON 8 MG/1
4 TABLET, FILM COATED ORAL ONCE
Refills: 0 | Status: COMPLETED | OUTPATIENT
Start: 2019-06-22 | End: 2019-06-22

## 2019-06-22 RX ORDER — CHOLECALCIFEROL (VITAMIN D3) 125 MCG
0 CAPSULE ORAL
Qty: 0 | Refills: 0 | DISCHARGE

## 2019-06-22 RX ORDER — HYDROXYZINE HCL 10 MG
30 TABLET ORAL
Refills: 0 | Status: DISCONTINUED | OUTPATIENT
Start: 2019-06-22 | End: 2019-06-22

## 2019-06-22 RX ORDER — ACETAMINOPHEN 500 MG
650 TABLET ORAL EVERY 6 HOURS
Refills: 0 | Status: DISCONTINUED | OUTPATIENT
Start: 2019-06-22 | End: 2019-06-24

## 2019-06-22 RX ORDER — SODIUM CHLORIDE 9 MG/ML
1000 INJECTION, SOLUTION INTRAVENOUS
Refills: 0 | Status: DISCONTINUED | OUTPATIENT
Start: 2019-06-22 | End: 2019-06-22

## 2019-06-22 RX ORDER — CALCIUM CARBONATE 500(1250)
1 TABLET ORAL EVERY 6 HOURS
Refills: 0 | Status: DISCONTINUED | OUTPATIENT
Start: 2019-06-22 | End: 2019-06-24

## 2019-06-22 RX ADMIN — SODIUM CHLORIDE 150 MILLILITER(S): 9 INJECTION, SOLUTION INTRAVENOUS at 15:46

## 2019-06-22 RX ADMIN — Medication 100 GRAM(S): at 02:59

## 2019-06-22 RX ADMIN — ONDANSETRON 4 MILLIGRAM(S): 8 TABLET, FILM COATED ORAL at 22:41

## 2019-06-22 RX ADMIN — Medication 1 TABLET(S): at 11:12

## 2019-06-22 RX ADMIN — RIVAROXABAN 20 MILLIGRAM(S): KIT at 21:03

## 2019-06-22 RX ADMIN — SODIUM CHLORIDE 175 MILLILITER(S): 9 INJECTION, SOLUTION INTRAVENOUS at 23:46

## 2019-06-22 RX ADMIN — Medication 25 MILLIGRAM(S): at 21:24

## 2019-06-22 RX ADMIN — SODIUM CHLORIDE 175 MILLILITER(S): 9 INJECTION, SOLUTION INTRAVENOUS at 20:37

## 2019-06-22 RX ADMIN — Medication 3 MILLIGRAM(S): at 22:42

## 2019-06-22 RX ADMIN — Medication 1 TABLET(S): at 07:49

## 2019-06-22 NOTE — H&P ADULT - HISTORY OF PRESENT ILLNESS
Pt is 64M with a PMHx of Afib on Xarelto, polycythemia vera on Jakafi, HTN, HLD, hx renal cell carcinoma s/p right partial nephrectomy 2012, hx bladder cancer presenting to the ED with hyperventilation and expressive aphasia. Patient underwent repeat TURBT with Dr. Love on 6/20 for concern for residual tumor. He developed hematuria with clots on 6/21 and was seen in the office.  A oates was placed with return of hematuria, and this was irrigated with return of pink clear urine.  He was instructed to stay hydrated, and return for TOV on Monday 6/24.  He resumed the Xarelto the night of the TURBT on 6/20, and held it on the day of presentation. A few hours after returning home from urologist's office on 6/21, he developed expressive aphasia, confusions, and hyperventilation for which his family took him to the ED.     At bedside, pt currently cannot recall event that lead him to the emergency department, but family at bedside describe it as a "panic attack." He was staring into space, hyperventilating, and could not express his thoughts. He remembers the ambulance ride here. Pt currently feels well and that he is at his baseline mental status. He denies F/C/N/V, CP/SOB, abd pain, constipation/diarrhea. He c/o persistent hematuria.     In ED, initial VS were T98.6 HR70 /64 RR50 SaO2 95% on RA. Labs notable for hyponatremia to 117 that got worse with normal saline bolus. Hemoglobin was 8.8 (baseline around 12) Pt is 64M with a PMHx of Afib on Xarelto, polycythemia vera on Jakafi, HTN, HLD, hx renal cell carcinoma s/p right partial nephrectomy 2012, hx bladder cancer presenting to the ED with hyperventilation and expressive aphasia. Patient underwent repeat TURBT with Dr. Love on 6/20 for concern for residual tumor. He developed hematuria with clots on 6/21 and was seen in the office.  A oates was placed with return of hematuria, and this was irrigated with return of pink clear urine.  He was instructed to stay hydrated, and return for TOV on Monday 6/24.  He resumed the Xarelto the night of the TURBT on 6/20, and held it on the day of presentation. A few hours after returning home from urologist's office on 6/21, he developed expressive aphasia, confusion, and hyperventilation for which his family took him to the ED.     At bedside, pt currently cannot recall event that lead him to the emergency department, but family at bedside describe it as a "panic attack." He was staring into space, hyperventilating, and could not express his thoughts. He remembers the ambulance ride here. Pt currently feels well and that he is at his baseline mental status. He denies F/C/N/V, CP/SOB, abd pain, constipation/diarrhea. He c/o persistent hematuria.     In ED, initial VS were T98.6 HR70 /64 RR50 SaO2 95% on RA. Labs notable for hyponatremia to 117 that got worse with normal saline bolus. Hemoglobin was 8.8 (baseline around 12) Pt is 64M with a PMHx of Afib on Xarelto, polycythemia vera on Jakafi, HTN, HLD, hx renal cell carcinoma s/p right partial nephrectomy 2012, hx bladder cancer presenting to the ED with hyperventilation and expressive aphasia. Patient underwent repeat TURBT with Dr. Love on 6/20 for concern for residual tumor. He developed hematuria with clots on 6/21 and was seen in the office.  A oates was placed with return of hematuria, and this was irrigated with 500cc of sterile water with return of light pink urine.  He was instructed to stay hydrated, and return for TOV on Monday 6/24.  He resumed the Xarelto the night of the TURBT on 6/20, and held it on the day of presentation. A few hours after returning home from urologist's office on 6/21, he developed expressive aphasia, confusion, and hyperventilation for which his family took him to the ED.     At bedside, pt currently cannot recall event that lead him to the emergency department, but family at bedside describe it as a "panic attack." He was staring into space, hyperventilating, and could not express his thoughts. He remembers the ambulance ride here. Pt currently feels well and that he is at his baseline mental status. He denies F/C/N/V, CP/SOB, abd pain, constipation/diarrhea. He c/o persistent hematuria.     In ED, initial VS were T98.6 HR70 /64 RR50 SaO2 95% on RA. Labs notable for hyponatremia to 117 that worsened to 114, he was then given a normal saline bolus with improvement of his sodium to 122. Hemoglobin was 8.8 (baseline around 12)

## 2019-06-22 NOTE — H&P ADULT - PROBLEM SELECTOR PLAN 7
DVT ppx: Xarelto  Diet: Regular On Jakafi  - nonformulary at hospital, pts family will bring in from home and then resume 15mg BID

## 2019-06-22 NOTE — H&P ADULT - NSHPLABSRESULTS_GEN_ALL_CORE
8.8    4.31  )-----------( 170      ( 22 Jun 2019 01:27 )             26.0       06-22    122<L>  |  91<L>  |  16  ----------------------------<  124<H>  4.6   |  17<L>  |  0.98    Ca    8.3<L>      22 Jun 2019 01:27  Phos  4.1     06-22  Mg     1.6     06-22    TPro  5.8<L>  /  Alb  3.8  /  TBili  1.1  /  DBili  x   /  AST  28  /  ALT  18  /  AlkPhos  41  06-21                  PT/INR - ( 21 Jun 2019 18:51 )   PT: 18.6 SEC;   INR: 1.65          PTT - ( 21 Jun 2019 18:51 )  PTT:28.2 SEC      CXR showing clear lungs    EKG with sinus bradycardia, but technically poor study LABS and ADDITIONAL STUDIES:                 8.8    4.31  )-----------( 170      ( 22 Jun 2019 01:27 )              26.0     06-22  117    |  114    |  122<L>  |  91<L>  |  16  ----------------------------<  124<H>  4.6   |  17<L>  |  0.98    Ca    8.3<L>      22 Jun 2019 01:27  Phos  4.1     06-22  Mg     1.6     06-22    TPro  5.8<L>  /  Alb  3.8  /  TBili  1.1  /  DBili  x   /  AST  28  /  ALT  18  /  AlkPhos  41  06-21        PT/INR - ( 21 Jun 2019 18:51 )   PT: 18.6 SEC;   INR: 1.65     PTT - ( 21 Jun 2019 18:51 )  PTT:28.2 SEC    Electrolytes, Urine (06.21.19 @ 20:45)    Sodium, Random Urine: 25: Reference range not established for this test mmol/L    Potassium, Random Urine: 27.9: Reference range not established for this test mmol/L    Chloride, Random Urine: < 20: Reference range not established for this test mmol/L  Osmolality, Random Urine (06.21.19 @ 20:45)    Osmolality, Random Urine: 337 mosmo/kg  Osmolality, Serum (06.22.19 @ 01:27)    Osmolality, Serum: 263: REPEATED mosmo/kg    Thyroid Stimulating Hormone, Serum (06.22.19 @ 01:27)    Thyroid Stimulating Hormone, Serum: 1.97 uIU/mL  Uric Acid, Serum (06.22.19 @ 01:27)    Uric Acid, Serum: 3.5 mg/dL  Cortisol, Serum (ESO) (06.22.19 @ 01:27)    Cortisol, Serum (ESO): 1.8: REFERENCE RANGE  ---------------  AM COLLECTION         6.0 - 18.4 ug/dL    PM COLLECTION         2.7 - 10.5 ug/dL ug/dL    < from: CT Head No Cont (06.21.19 @ 21:20) >    IMPRESSION:     No evidence of acute intracranial hemorrhage, midline shift or CT   evidence of acute territorial infarct.    < end of copied text >    CXR showing clear lungs    EKG with sinus bradycardia, but technically poor study, nl axis, ATc 447, no significant ST-T wave changes

## 2019-06-22 NOTE — PROGRESS NOTE ADULT - PROBLEM SELECTOR PLAN 2
Serum osm 263 with urine Na+ > 20, mildly hypervolemic on exam, hyponatremia worsened with fluid restriction. Sodium now rapidly correcting after 1L normal saline. Ddx includes post-obstructive diuresis, however urine osms are relatively high for that diagnosis; SIADH 2/2 retention (566cc removed on 6/21); hyponatremia 2/2 bladder irrigation (used sterile water)  - recheck sodium stat, then determine for IVF vs fluid restriction vs salt tabs based on value  - BMP Q6H  - Cortisol low, however not hypotensive, hypothermic and potassium wnl, unlikely to have hyponatremia 2/2 adrenal insufficency  - pt has oates, strict ins and outs  - f/u UA to r/o infection  - recommend renal evaluation Serum osm 263 with urine Na+ > 20, mildly hypervolemic on exam, hyponatremia worsened with fluid restriction. Sodium now rapidly correcting after 1L normal saline. Ddx includes post-obstructive diuresis, however urine osms are relatively high for that diagnosis; SIADH 2/2 retention (566cc removed on 6/21); hyponatremia 2/2 bladder irrigation (used sterile water)  - BMP Q6H  - Cortisol low, however not hypotensive, hypothermic and potassium wnl, unlikely to have hyponatremia 2/2 adrenal insufficency  - pt has oates, strict ins and outs  - f/u UA to r/o infection  - Nephrology consulted  - Suspect acute hyponatremia 2/2 irrigation, corrected rapidly from 114 to 130 (16 units in <24h). Holding off on D5w for now pending nephrology input Serum osm 263 with urine Na+ > 20, mildly hypervolemic on exam, hyponatremia worsened with fluid restriction. Sodium now rapidly correcting after 1L normal saline. Ddx includes post-obstructive diuresis, however urine osms are relatively high for that diagnosis; SIADH 2/2 retention (566cc removed on 6/21); hyponatremia 2/2 bladder irrigation (used sterile water)  - BMP Q6H  - Cortisol low, however not hypotensive, hypothermic and potassium wnl, unlikely to have hyponatremia 2/2 adrenal insufficency  - pt has oates, strict ins and outs  - f/u UA to r/o infection  - Suspect acute hyponatremia 2/2 irrigation, corrected rapidly from 114 to 130 (16 units in <24h). Holding off on D5w for now pending nephrology input  - Given correction to 138, per nephrology goal is 130 so will start d5w at 150cc/hr and check BMP q6h until stabilized.

## 2019-06-22 NOTE — PROGRESS NOTE ADULT - ASSESSMENT
64M PMH Afib on Xarelto, polycythemia vera on Jakafi, HTN, HLD, hx renal cell carcinoma s/p right partial nephrectomy 2012, recently dx bladder cancer s/p TURBT (6/20) and irrigation for hematuria6/21) presenting to the ED with hyperventilation and expressive aphasia most likely 2/2 acute metabolic encephalopathy from symptomatic hyponatremia. 64M PMH Afib on Xarelto, polycythemia vera on Jakafi, HTN, HLD, hx renal cell carcinoma s/p right partial nephrectomy 2012, recently dx bladder cancer s/p TURBT (6/20) and irrigation with sterile water for hematuria (6/21) presenting to the ED with hyperventilation and expressive aphasia 2/2 acute metabolic encephalopathy from symptomatic hyponatremia.

## 2019-06-22 NOTE — PROGRESS NOTE ADULT - PROBLEM SELECTOR PLAN 4
CHADSVASC=1 (HTN hx), currently in sinus bradycardia with possible active bleed.  - holding Xarelto as above  - will hold cardizem for now given concern for bleed and bradycardia. Resume when clinically indicated

## 2019-06-22 NOTE — PROGRESS NOTE ADULT - PROBLEM SELECTOR PLAN 1
Due to hyponatremia, now at baseline mental status per wife and family. CT head negative for acute intracranial process, neuro exam wnl. Sodium now 130, rapidly corrected from 114.  - Continue to trend BMP + Mg + Phos q6H  - Neurochecks q8

## 2019-06-22 NOTE — PROGRESS NOTE ADULT - SUBJECTIVE AND OBJECTIVE BOX
Tree Archuleta [phone]    Patient is a 64y old  Male who presents with a chief complaint of Hyponatremia (22 Jun 2019 05:28)    INTERVAL HPI/OVERNIGHT EVENTS:  Afebrile, saturating well overnight. NAEO. This AM, pt markedly improved from yesterday per family and pt.    Denies headache, blurry vision, weakness, or pain this AM.      REVIEW OF SYSTEMS  Constitutional: [ ] negative [ ] fevers [ ] chills [ ] weight loss [ ] weight gain [x] AMS previously [x] LE swelling  HEENT: [x] negative [ ] dry eyes [ ] eye irritation [ ] postnasal drip [ ] nasal congestion  CV: [x] negative  [ ] chest pain [ ] orthopnea [ ] palpitations [ ] murmur  Resp: [x] negative [ ] cough [ ] shortness of breath [ ] dyspnea [ ] wheezing [ ] sputum [ ] hemoptysis  GI: [x] negative [ ] nausea [ ] vomiting [ ] diarrhea [ ] constipation [ ] abd pain [ ] dysphagia   : [ ] negative [ ] dysuria [ ] nocturia [x] hematuria [ ] increased urinary frequency  Musculoskeletal: [x] negative [ ] back pain [ ] myalgias [ ] arthralgias [ ] fracture  Skin: [x] negative [ ] rash [ ] itch  Neurological: [ ] negative [ ] headache [ ] dizziness [ ] syncope [ ] weakness [ ] numbness [x] had difficulty with walking previously  Psychiatric: [x] negative [ ] anxiety [ ] depression  Endocrine: [x] negative [ ] diabetes [ ] thyroid problem  Hematologic/Lymphatic: [ ] negative [x] anemia [ ] coagulopathy  Allergic/Immunologic: [x] negative [ ] itchy eyes [ ] nasal discharge [ ] hives [ ] angioedema  [x] All other systems negative  [ ] Unable to assess ROS because ________      MEDICATIONS  (STANDING):  hydrOXYzine hydrochloride 30 milliGRAM(s) Oral two times a day  multivitamin 1 Tablet(s) Oral daily    Allergies: Cipro (Rash)    PAST MEDICAL & SURGICAL HISTORY:  Polycythemia: vera  Basal cell carcinoma: nose  Renal cell carcinoma  Peripheral neuropathy: &quot;both feet&quot;  Cataract of right eye  Bulging: cervical disc  Plantar fasciitis, bilateral  H/O atrial fibrillation without current medication: occurred post op 12/2012, pt. was on Cardizem x1 month afterwards  Melanoma in situ of skin of trunk  BPH (benign prostatic hypertrophy)  Psoriasis (a type of skin inflammation)  Degenerated intervertebral disc: lumbar  H/O polycythemia vera  Hyperlipidemia  HTN (hypertension)  Mohs defect: mohs procedure bilateral sides of nose with skin graft from clavicular areas 7/2016  S/P cystoscopy: 5/15/14  H/O partial nephrectomy: right-12/17/12  Melanoma in situ of back: x1-1983, [id number]  S/P cystoscopy: TURBT-05/2012  Left varicocele      Vital Signs Last 24 Hrs  T(C): 36.7 (22 Jun 2019 02:32), Max: 37 (21 Jun 2019 18:07)  T(F): 98.1 (22 Jun 2019 02:32), Max: 98.6 (21 Jun 2019 18:07)  HR: 57 (22 Jun 2019 02:32) (55 - 70)  BP: 108/62 (22 Jun 2019 02:32) (106/51 - 125/64)  BP(mean): --  RR: 17 (22 Jun 2019 02:32) (17 - 50)  SpO2: 97% (22 Jun 2019 02:32) (95% - 100%)      In's and Outs:   06-21-19 @ 07:01  -  06-22-19 @ 07:00  --------------------------------------------------------  IN: 0 mL / OUT: 1700 mL / NET: -1700 mL      PHYSICAL EXAMINATION:  GENERAL: The patient is awake and alert in no apparent distress. AOx3  HEENT: NCAT. EOMI. Mucous membranes are moist.  NECK: Supple. No JVD.  LUNGS: [x] Clear to auscultation b/l   [x] Unlabored breathing   [ ] Wheezing  [ ] Rales  [ ] Rhonchi  HEART: [x] Normal  [ ] Irregular rhythm  [ ] Tachycardia  [ ] Bradycardia   [ ] Systolic murmur  [ ] Diastolic murmur  [ ] Gallop   ABDOMEN: [x] Normal  [ ] Hard  [ ] Tender  [ ] Distended [ ] Bowel sounds  GENITOURINARY: [ ] Normal  [ ] Incontinent   [ ] Oliguria/Anuria   [x] Diana - [place] wine colored  EXTREMITIES: [ ] Normal  [ ] Cyanosis  [x] Edema 1+ b/l LE  NEUROLOGIC: [x] Grossly intact  [ ] Focal neurologic deficits  SKIN: [x] Normal  [ ] Rash   [ ] Ulcers [x] +Lipoma L back  Musculoskeletal:  [x] Normal   [ ] Generalized weakness  [ ] Bedbound      LABS:                        10.6   4.59  )-----------( 150      ( 22 Jun 2019 07:30 )             32.7     Hgb Trend: 10.6<--, 8.8<--, 10.0<--  06-22    130<L>  |  97<L>  |  18  ----------------------------<  96  4.1   |  18<L>  |  1.03    Ca    8.8      22 Jun 2019 07:30  Phos  3.7     06-22  Mg     2.1     06-22    TPro  5.8<L>  /  Alb  3.8  /  TBili  1.1  /  DBili  x   /  AST  28  /  ALT  18  /  AlkPhos  41  06-21    Creatinine Trend: 1.03<--, 1.02<--, 0.98<--, 0.95<--, 1.01<--    PT/INR - ( 21 Jun 2019 18:51 )   PT: 18.6 SEC;   INR: 1.65        PTT - ( 21 Jun 2019 18:51 )  PTT:28.2 SEC    D-Dimer Assay, Quantitative: 224 ng/mL (06-21-19 @ 19:54)    Osmolality, Serum: 263: REPEATED mosmo/kg (06.22.19 @ 01:27)    Osmolality, Random Urine: 337 mosmo/kg (06.21.19 @ 20:45)    Sodium, Random Urine: 25: Reference range not established for this test mmol/L (06.21.19 @ 20:45)

## 2019-06-22 NOTE — CONSULT NOTE ADULT - ASSESSMENT
64M with PMHx of afib on Xarelto (last dose last night), PV, HTN, HLD, RCC s/p partial nephrectomy, BCC, melanoma, and newly diagnosed bladder cancer initially presenting with hyperventilation, found to be hyponatremic    #Hyponatremic  Please obtain serum osmoles, urine electrolytes, urine osmoles   Na 117 --> 114, with no IVF given  Would start NS IVF   Continue to trend BMP + Mg + Phos q4   Neurochecks q8  May be 2/2 SIADH vs post obstructive diuresis  Strict I+Os   Continue oates  Would obtain UA given dysuria and hematuria and post procedural  If significant net negative, consider matching input   Urology consulted, f/u recs     #Hematuria  Given hematuria while on Xarelto   CBCq12  Discuss with urology need for CBI, currently oates draining well     If any mental status worsenes or hyponatremia continues to worsen, please feel free to reconsult MICU. Patient hemodynamically stable, mentating well, does not need MICU level of care at this time.    Case discussed with MICU attending

## 2019-06-22 NOTE — PROGRESS NOTE ADULT - ASSESSMENT
64 year old male with a medical history significant for Afib on Xarelto, polycythemia vera, HTN, HLD, hx renal cell carcinoma s/p right partial nephrectomy 2012, hx bladder cancer presenting to the ED with hyperventilation and expressive aphasia, Urology consult called for gross hematuria in setting of recent TURBT.     -- oates output clear pink, monitor color on Xarelto  -- appreciate medical and renal recs re: hyponatremia  -- ditropan 5mg q8 or belladonna and opium suppositories PRN bladder spasms  -- recommend senna and colace if taking ditropan to prevent constipation  -- pending color, possible TOV 6/24

## 2019-06-22 NOTE — H&P ADULT - PROBLEM SELECTOR PLAN 5
Normotensive on admission with downtrending H/H  - Hold home ARB in setting of bleeding, add back as clinically indicated With recent instrumentation; urology evaluated pt in ED, no urgent urologic intervention indicated.  - f/u urology recs With recent instrumentation; urology evaluated pt in ED, no urgent urologic intervention indicated.  - f/u further urology recs

## 2019-06-22 NOTE — CONSULT NOTE ADULT - SUBJECTIVE AND OBJECTIVE BOX
[facility] DIVISION OF KIDNEY DISEASES AND HYPERTENSION -- INITIAL CONSULT NOTE  --------------------------------------------------------------------------------  HPI:        PAST HISTORY  --------------------------------------------------------------------------------  PAST MEDICAL & SURGICAL HISTORY:  Polycythemia: vera  Basal cell carcinoma: nose  Renal cell carcinoma  Peripheral neuropathy: &quot;both feet&quot;  Cataract of right eye  Bulging: cervical disc  Plantar fasciitis, bilateral  H/O atrial fibrillation without current medication: occurred post op 12/2012, pt. was on Cardizem x1 month afterwards  Melanoma in situ of skin of trunk  BPH (benign prostatic hypertrophy)  Psoriasis (a type of skin inflammation)  Degenerated intervertebral disc: lumbar  H/O polycythemia vera  Hyperlipidemia  HTN (hypertension)  Mohs defect: mohs procedure bilateral sides of nose with skin graft from clavicular areas 7/2016  S/P cystoscopy: 5/15/14  H/O partial nephrectomy: right-12/17/12  Melanoma in situ of back: x1-1983, [phone]  S/P cystoscopy: TURBT-05/2012  Left varicocele    FAMILY HISTORY:  Family history of diabetes mellitus (Sibling)  Family history of kidney disease  Family history of CHF (congestive heart failure)    PAST SOCIAL HISTORY:    ALLERGIES & MEDICATIONS  --------------------------------------------------------------------------------  Allergies    Cipro (Rash)    Intolerances      Standing Inpatient Medications  hydrOXYzine hydrochloride 30 milliGRAM(s) Oral two times a day  multivitamin 1 Tablet(s) Oral daily    PRN Inpatient Medications      REVIEW OF SYSTEMS  --------------------------------------------------------------------------------  Gen: No weight changes, fatigue, fevers/chills, weakness  Skin: No rashes  Head/Eyes/Ears/Mouth: No headache; Normal hearing; Normal vision w/o blurriness; No sinus pain/discomfort, sore throat  Respiratory: No dyspnea, cough, wheezing, hemoptysis  CV: No chest pain, PND, orthopnea  GI: No abdominal pain, diarrhea, constipation, nausea, vomiting, melena, hematochezia  : No increased frequency, dysuria, hematuria, nocturia  MSK: No joint pain/swelling; no back pain; no edema  Neuro: No dizziness/lightheadedness, weakness, seizures, numbness, tingling  Heme: No easy bruising or bleeding  Endo: No heat/cold intolerance  Psych: No significant nervousness, anxiety, stress, depression    All other systems were reviewed and are negative, except as noted.    VITALS/PHYSICAL EXAM  --------------------------------------------------------------------------------  T(C): 36.9 (06-22-19 @ 09:23), Max: 37 (06-21-19 @ 18:07)  HR: 61 (06-22-19 @ 09:23) (55 - 70)  BP: 102/56 (06-22-19 @ 09:23) (102/56 - 125/64)  RR: 18 (06-22-19 @ 09:23) (17 - 50)  SpO2: 99% (06-22-19 @ 09:23) (95% - 100%)  Wt(kg): --  Height (cm): 185.42 (06-20-19 @ 16:17)  Weight (kg): 100.2 (06-20-19 @ 16:17)  BMI (kg/m2): 29.1 (06-20-19 @ 16:17)  BSA (m2): 2.24 (06-20-19 @ 16:17)      06-21-19 @ 07:01  -  06-22-19 @ 07:00  --------------------------------------------------------  IN: 0 mL / OUT: 1700 mL / NET: -1700 mL    06-22-19 @ 07:01  -  06-22-19 @ 13:44  --------------------------------------------------------  IN: 0 mL / OUT: 2000 mL / NET: -2000 mL      Physical Exam:  	Gen: NAD, well-appearing  	HEENT: PERRL, supple neck, clear oropharynx  	Pulm: CTA B/L  	CV: RRR, S1S2; no rub  	Back: No spinal or CVA tenderness; no sacral edema  	Abd: +BS, soft, nontender/nondistended  	: No suprapubic tenderness  	UE: Warm, FROM, no clubbing, intact strength; no edema; no asterixis  	LE: Warm, FROM, no clubbing, intact strength; no edema  	Neuro: No focal deficits, intact gait  	Psych: Normal affect and mood  	Skin: Warm, without rashes  	Vascular access:    LABS/STUDIES  --------------------------------------------------------------------------------              10.6   4.59  >-----------<  150      [06-22-19 @ 07:30]              32.7     138  |  108  |  18  ----------------------------<  110      [06-22-19 @ 12:45]  4.0   |  20  |  1.09        Ca     9.7     [06-22-19 @ 12:45]      Mg     2.1     [06-22-19 @ 12:45]      Phos  2.7     [06-22-19 @ 12:45]    TPro  5.8  /  Alb  3.8  /  TBili  1.1  /  DBili  x   /  AST  28  /  ALT  18  /  AlkPhos  41  [06-21-19 @ 18:51]    PT/INR: PT 18.6 , INR 1.65       [06-21-19 @ 18:51]  PTT: 28.2       [06-21-19 @ 18:51]    Uric acid 3.5      [06-22-19 @ 01:27]  Serum Osmolality 263      [06-22-19 @ 01:27]    Creatinine Trend:  SCr 1.09 [06-22 @ 12:45]  SCr 1.03 [06-22 @ 07:30]  SCr 1.02 [06-22 @ 06:36]  SCr 0.98 [06-22 @ 01:27]  SCr 0.95 [06-21 @ 20:45]    Urinalysis - [06-22-19 @ 08:55]      Color COLORLESS / Appearance CLEAR / SG 1.004 / pH 6.5      Gluc NEGATIVE / Ketone NEGATIVE  / Bili NEGATIVE / Urobili NORMAL       Blood LARGE / Protein 50 / Leuk Est MODERATE / Nitrite NEGATIVE      RBC 3-5 / WBC 11-25 / Hyaline NEGATIVE / Gran  / Sq Epi OCC / Non Sq Epi  / Bacteria NEGATIVE    Urine Sodium 25      [06-21-19 @ 20:45]  Urine Potassium 27.9      [06-21-19 @ 20:45]  Urine Chloride < 20      [06-21-19 @ 20:45]  Urine Osmolality 337      [06-21-19 @ 20:45]    TSH 1.97      [06-22-19 @ 01:27] [facility] DIVISION OF KIDNEY DISEASES AND HYPERTENSION -- INITIAL CONSULT NOTE  --------------------------------------------------------------------------------  HPI:    64 year old man with pmh of afib on xarelto, polycythemia vera on Jakafi, HTN, HLD, hx renal cell carcinoma s/p partial nephrectomy, hx bladder cancer presenting with aphasia and AMS. Pt had a bladder biopsy (for a lesion seen when attempting botox to treat frequency) on 06/20 with Dr. Love and c/o bladder fullness, urinary retention with some dribbling 12 hours after procedure. Pt also had a 'tripp attack' soon after and was taken to Dr. Love's office where he was found to have urinary retention  with oates placement and return of 500 cc pink urine. He was instructed to stay hydrated, and return for TOV on Monday 6/24.  He resumed the Xarelto the night of the TURBT on 6/20, and held it on the day of presentation. A few hours after returning home from urologist's office on 6/21, he developed expressive aphasia, confusion, and hyperventilation for which his family took him to the ED. Pt  was found to have hyponatremia (Serum sodium~117) on presentation worsening to 114. He was given IV fluids with sodium levels improving to 122->130-> 138.    Pt seen and examined. States he feels well. Denies headache, dizziness, lightheadedness, nausea, vomiting, numbness, tingling in extremities or muscle weakness. Denies history of hyponatremia. Reports he had labs done prior to biopsy which were normal. Pt denies HCTZ, SSRI, NSAIDs or other OTC meds use. Endorses 3 glasses of water intake daily.       PAST HISTORY  --------------------------------------------------------------------------------  PAST MEDICAL & SURGICAL HISTORY:  Polycythemia: vera  Basal cell carcinoma: nose  Renal cell carcinoma  Peripheral neuropathy: &quot;both feet&quot;  Cataract of right eye  Bulging: cervical disc  Plantar fasciitis, bilateral  H/O atrial fibrillation without current medication: occurred post op 12/2012, pt. was on Cardizem x1 month afterwards  Melanoma in situ of skin of trunk  BPH (benign prostatic hypertrophy)  Psoriasis (a type of skin inflammation)  Degenerated intervertebral disc: lumbar  H/O polycythemia vera  Hyperlipidemia  HTN (hypertension)  Mohs defect: mohs procedure bilateral sides of nose with skin graft from clavicular areas 7/2016  S/P cystoscopy: 5/15/14  H/O partial nephrectomy: right-12/17/12  Melanoma in situ of back: x1-1983, x2-20013  S/P cystoscopy: TURBT-05/2012  Left varicocele    FAMILY HISTORY:  Family history of diabetes mellitus (Sibling)  Family history of kidney disease  Family history of CHF (congestive heart failure)    PAST SOCIAL HISTORY:    ALLERGIES & MEDICATIONS  --------------------------------------------------------------------------------  Allergies    Cipro (Rash)    Intolerances      Standing Inpatient Medications  hydrOXYzine hydrochloride 30 milliGRAM(s) Oral two times a day  multivitamin 1 Tablet(s) Oral daily    PRN Inpatient Medications      REVIEW OF SYSTEMS  --------------------------------------------------------------------------------  Gen: No weight changes, fatigue, fevers/chills, weakness  Skin: No rashes  Head/Eyes/Ears/Mouth: No headache; Normal hearing; Normal vision w/o blurriness; No sinus pain/discomfort, sore throat  Respiratory: No dyspnea, cough, wheezing, hemoptysis  CV: No chest pain, PND, orthopnea  GI: No abdominal pain, diarrhea, constipation, nausea, vomiting, melena, hematochezia  : No increased frequency, dysuria, hematuria, nocturia  MSK: No joint pain/swelling; no back pain; no edema  Neuro: No dizziness/lightheadedness, weakness, seizures, numbness, tingling  Heme: No easy bruising or bleeding  Endo: No heat/cold intolerance  Psych: No significant nervousness, anxiety, stress, depression    All other systems were reviewed and are negative, except as noted.    VITALS/PHYSICAL EXAM  --------------------------------------------------------------------------------  T(C): 36.9 (06-22-19 @ 09:23), Max: 37 (06-21-19 @ 18:07)  HR: 61 (06-22-19 @ 09:23) (55 - 70)  BP: 102/56 (06-22-19 @ 09:23) (102/56 - 125/64)  RR: 18 (06-22-19 @ 09:23) (17 - 50)  SpO2: 99% (06-22-19 @ 09:23) (95% - 100%)  Wt(kg): --  Height (cm): 185.42 (06-20-19 @ 16:17)  Weight (kg): 100.2 (06-20-19 @ 16:17)  BMI (kg/m2): 29.1 (06-20-19 @ 16:17)  BSA (m2): 2.24 (06-20-19 @ 16:17)      06-21-19 @ 07:01  -  06-22-19 @ 07:00  --------------------------------------------------------  IN: 0 mL / OUT: 1700 mL / NET: -1700 mL    06-22-19 @ 07:01  -  06-22-19 @ 13:44  --------------------------------------------------------  IN: 0 mL / OUT: 2000 mL / NET: -2000 mL      Physical Exam:  	Gen: NAD, well-appearing  	HEENT: PERRL, supple neck, clear oropharynx  	Pulm: CTA B/L  	CV: RRR, S1S2; no rub  	Back: No spinal or CVA tenderness; no sacral edema  	Abd: +BS, soft, nontender/nondistended  	: No suprapubic tenderness  	UE: Warm, FROM, no clubbing, intact strength; no edema; no asterixis  	LE: Warm, FROM, no clubbing, intact strength; no edema  	Neuro: No focal deficits, intact gait  	Psych: Normal affect and mood  	Skin: Warm, without rashes  	Vascular access:    LABS/STUDIES  --------------------------------------------------------------------------------              10.6   4.59  >-----------<  150      [06-22-19 @ 07:30]              32.7     138  |  108  |  18  ----------------------------<  110      [06-22-19 @ 12:45]  4.0   |  20  |  1.09        Ca     9.7     [06-22-19 @ 12:45]      Mg     2.1     [06-22-19 @ 12:45]      Phos  2.7     [06-22-19 @ 12:45]    TPro  5.8  /  Alb  3.8  /  TBili  1.1  /  DBili  x   /  AST  28  /  ALT  18  /  AlkPhos  41  [06-21-19 @ 18:51]    PT/INR: PT 18.6 , INR 1.65       [06-21-19 @ 18:51]  PTT: 28.2       [06-21-19 @ 18:51]    Uric acid 3.5      [06-22-19 @ 01:27]  Serum Osmolality 263      [06-22-19 @ 01:27]    Creatinine Trend:  SCr 1.09 [06-22 @ 12:45]  SCr 1.03 [06-22 @ 07:30]  SCr 1.02 [06-22 @ 06:36]  SCr 0.98 [06-22 @ 01:27]  SCr 0.95 [06-21 @ 20:45]    Urinalysis - [06-22-19 @ 08:55]      Color COLORLESS / Appearance CLEAR / SG 1.004 / pH 6.5      Gluc NEGATIVE / Ketone NEGATIVE  / Bili NEGATIVE / Urobili NORMAL       Blood LARGE / Protein 50 / Leuk Est MODERATE / Nitrite NEGATIVE      RBC 3-5 / WBC 11-25 / Hyaline NEGATIVE / Gran  / Sq Epi OCC / Non Sq Epi  / Bacteria NEGATIVE    Urine Sodium 25      [06-21-19 @ 20:45]  Urine Potassium 27.9      [06-21-19 @ 20:45]  Urine Chloride < 20      [06-21-19 @ 20:45]  Urine Osmolality 337      [06-21-19 @ 20:45]    TSH 1.97      [06-22-19 @ 01:27]

## 2019-06-22 NOTE — H&P ADULT - NSHPSOCIALHISTORY_GEN_ALL_CORE
Pt lives with wife and son in private residence and is independent in his ADLs. He denies tobacco, alcohol, or drug use.

## 2019-06-22 NOTE — H&P ADULT - NSHPREVIEWOFSYSTEMS_GEN_ALL_CORE
REVIEW OF SYSTEMS:    CONSTITUTIONAL: +altered mental status; No fevers or chills  EYES/ENT: No visual changes;  No vertigo or throat pain   NECK: No pain or stiffness  RESPIRATORY: No cough, wheezing, hemoptysis; No shortness of breath  CARDIOVASCULAR: No chest pain or palpitations  GASTROINTESTINAL: No abdominal or epigastric pain. No nausea, vomiting, or hematemesis; No diarrhea or constipation. No melena or hematochezia.  GENITOURINARY: +hematuria, +obstruction; No dysuria, frequency  NEUROLOGICAL: No numbness or weakness  SKIN: No itching, burning, rashes, or lesions   All other review of systems is negative unless indicated above. REVIEW OF SYSTEMS:    CONSTITUTIONAL: +altered mental status; No fevers or chills  EYES: No visual changes or eye discharge  ENT: No rhinorrhea or sore throat  NECK: No pain or stiffness  RESPIRATORY: No cough, wheezing, hemoptysis; No shortness of breath  CARDIOVASCULAR: No chest pain or palpitations  GASTROINTESTINAL: No abdominal or epigastric pain. No nausea, vomiting, or hematemesis; No diarrhea or constipation. No melena or hematochezia.  GENITOURINARY: +hematuria, +obstruction; No dysuria, frequency  NEUROLOGICAL: No numbness or weakness  SKIN: No itching, burning, rashes, or lesions

## 2019-06-22 NOTE — H&P ADULT - PROBLEM SELECTOR PLAN 4
With recent instrumentation; urology evaluated pt in ED, no urgent urologic intervention indicated.  - f/u urology recs CHADSVASC=1 (HTN hx), currently in sinus bradycardia with possible active bleed.  - holding Xarelto as above  - will hold cardizem for now given concern for bleed and bradycardia. Resume when clinically indicated

## 2019-06-22 NOTE — PROGRESS NOTE ADULT - SUBJECTIVE AND OBJECTIVE BOX
Subjective  Pt doing well. Denies dizziness, HA. Reports mild bladder spasms.    Objective    Vital signs  T(F): , Max: 98.4 (06-22-19 @ 09:23)  HR: 67 (06-22-19 @ 17:14)  BP: 140/68 (06-22-19 @ 17:14)  SpO2: 98% (06-22-19 @ 17:14)  Wt(kg): --    Output     06-21 @ 07:01  -  06-22 @ 07:00  --------------------------------------------------------  IN: 0 mL / OUT: 1700 mL / NET: -1700 mL    06-22 @ 07:01  -  06-22 @ 18:38  --------------------------------------------------------  IN: 0 mL / OUT: 2000 mL / NET: -2000 mL        Gen: NAD  Abd: soft, NT, ND  : oates output clear pink    Labs      06-22 @ 12:45    WBC --    / Hct --    / SCr 1.09     06-22 @ 07:30    WBC 4.59  / Hct 32.7  / SCr 1.03     Imaging

## 2019-06-22 NOTE — H&P ADULT - NSICDXPASTMEDICALHX_GEN_ALL_CORE_FT
PAST MEDICAL HISTORY:  Basal cell carcinoma nose    BPH (benign prostatic hypertrophy)     Bulging cervical disc    Cataract of right eye     Degenerated intervertebral disc lumbar    H/O atrial fibrillation without current medication occurred post op 12/2012, pt. was on Cardizem x1 month afterwards    H/O polycythemia vera     HTN (hypertension)     Hyperlipidemia     Melanoma in situ of skin of trunk     Peripheral neuropathy "both feet"    Plantar fasciitis, bilateral     Polycythemia vera    Psoriasis (a type of skin inflammation)     Renal cell carcinoma

## 2019-06-22 NOTE — H&P ADULT - PROBLEM SELECTOR PLAN 6
On Jakafi  - nonformulary at hospital, pts family will bring in from home and then resume 15mg BID Normotensive on admission with downtrending H/H  - Hold home ARB in setting of bleeding, add back as clinically indicated

## 2019-06-22 NOTE — H&P ADULT - PROBLEM SELECTOR PLAN 2
Serum osm decreased to 263 with Na+ > 20, got worse with fluid restriction. Overcorrected after 1L normal saline. Likely hypovolemic hyponatremia, post  - recheck sodium stat, then give IVF vs fluid restriction based on value  - BMP Q4H  - f/u TSH, uric acid  - pt has oates, strict ins and outs Serum osm decreased to 263 with Na+ > 20, currently appears euvolemic on exam, however hyponatremia worsened with fluid restriction. Sodium now overcorrected after 1L normal saline. Labs not clearly suggestive of volume depletion or SIADH. Ddx also includes post-obstructive diuresis, however urine osms are relatively high for that diagnosis.  - recheck sodium stat, then give IVF vs fluid restriction vs salt tabs based on value  - BMP Q6H  - Cortisol low, however not hypotensive, hypothermic and potassium wnl  - pt has oates, strict ins and outs  - f/u UA to r/o infection Serum osm decreased to 263 with Na+ > 20, currently appears euvolemic on exam, however hyponatremia worsened with fluid restriction. Sodium now overcorrected after 1L normal saline. Labs not clearly suggestive of volume depletion or SIADH. Ddx also includes post-obstructive diuresis, however urine osms are relatively high for that diagnosis.  - recheck sodium stat, then give IVF vs fluid restriction vs salt tabs based on value  - BMP Q6H  - Cortisol low, however not hypotensive, hypothermic and potassium wnl  - pt has oates, strict ins and outs  - f/u UA to r/o infection  - recommend renal evaluation Serum osm decreased to 263 with urine Na+ > 20, currently appears euvolemic on exam, however hyponatremia worsened with fluid restriction. Sodium now rapidly correcting after 1L normal saline. Labs not clearly suggestive of volume depletion or SIADH. Ddx also includes post-obstructive diuresis, however urine osms are relatively high for that diagnosis.  - recheck sodium stat, then determine for IVF vs fluid restriction vs salt tabs based on value  - BMP Q6H  - Cortisol low, however not hypotensive, hypothermic and potassium wnl, unlikely to have hyponatremia 2/2 adrenal insufficency  - pt has oates, strict ins and outs  - f/u UA to r/o infection  - recommend renal evaluation

## 2019-06-22 NOTE — CONSULT NOTE ADULT - ATTENDING COMMENTS
Hyponatremia in the setting of post obstructive uropathy   urine lytes, serum osm, urine osm, TSH, cortisol, uric acid  serial BMP, mag, phos  IVF  CT head    no need for hypertonic saline at this time, reconsult as needed
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  This was complicated by urinary retention requiring placement of oates.  Patient admitted with AMS and aphasia which started after the procedure.  Labs showed sodium of 117, down to 114.  Treatment included continuation of oates, NS and serial labs showing sodium increased to 130 then 138.  Pre procedure labs unknown.  Differential includes SIADH but rapid improvement rules against this.  Unclear how much irrigation solution was required at the time of procedure.  This can be absorbed and cause in essence, a dilutional hyponatremia.  This typically will improve as fluid is mobilized and excreted.  Agree with provision of hypotonic intravenous fluids, for now, given the rapid rise in sodium.  Patient's symptoms have improved.  Will monitor with you.  Further recommendations per clinical course.  Reviewed with patient and wife at the bedside.

## 2019-06-22 NOTE — H&P ADULT - NSICDXFAMILYHX_GEN_ALL_CORE_FT
FAMILY HISTORY:  Family history of CHF (congestive heart failure)  Family history of kidney disease    Sibling  Still living? Unknown  Family history of diabetes mellitus, Age at diagnosis: Age Unknown

## 2019-06-22 NOTE — CONSULT NOTE ADULT - PROBLEM SELECTOR RECOMMENDATION 9
Pt with ? acute, symptomatic, euvolemic hyponatremia. Pt previously with normal serum sodium levels. Last known sodium 141 in May. Pt had pre op labs earlier this week which were reportedly normal. Serum sodium low at 114 on presentation, corrected to 130 in less than 24 hours. Serum sodium now with  further uptrend to 138. Recommend to start D5W @ 150 ml/hour.  Monitor BMP q4-6h. Goal would be to keep serum sodium ~130 over the next 24 hours. Pt with ? acute, hypotonic, symptomatic, euvolemic hyponatremia. Pt previously with normal serum sodium levels. Last known sodium 141 in May. Pt had pre op labs earlier this week which were reportedly normal. Serum sodium low at 117 on presentation, corrected to 130 in ~12 hours. Hyponatremia likely in setting of excessive absorption of hypotonic solutions used in TURBT. Serum sodium now with  further uptrend to 138. Recommend to start D5W @ 150 ml/hour.  Monitor BMP q4-6h. Goal would be to keep serum sodium ~130 over the next 24 hours.  TSH, cortisol and uric acid levels noted. Repeat Uosm and lytes.

## 2019-06-22 NOTE — PROGRESS NOTE ADULT - PROBLEM SELECTOR PLAN 5
With recent instrumentation; urology evaluated pt in ED, no urgent urologic intervention indicated.  - f/u further urology recs With recent instrumentation; urology evaluated pt in ED, no urgent urologic intervention indicated.  - Pending urology input re: when to do TOV

## 2019-06-22 NOTE — H&P ADULT - ASSESSMENT
64M with a PMHx of Afib on Xarelto, polycythemia vera on Jakafi, HTN, HLD, hx renal cell carcinoma s/p right partial nephrectomy 2012, hx bladder cancer presenting to the ED with hyperventilation and expressive aphasia most likely 2/2 acute metabolic encephalopathy from symptomatic hyponatremia.

## 2019-06-22 NOTE — H&P ADULT - PROBLEM SELECTOR PLAN 1
ff Due to hyponatremia, now at baseline mental status per wife and family. CT head negative for acute intracranial process, neuro exam wnl. Sodium now 122, overcorrected from 114  - recheck sodium now and c/w IVF vs fluid restriction based on value, goal rate of correction is no more than 8meq/L over 24 hours  - Continue to trend BMP + Mg + Phos q4   - Neurochecks q8 Due to hyponatremia, now at baseline mental status per wife and family. CT head negative for acute intracranial process, neuro exam wnl. Labs not clearly suggestive of volume depletion or SIADH. Sodium now 122, overcorrected from 114  - recheck sodium now and c/w IVF vs fluid restriction based on value, goal rate of correction is no more than 8meq/L over 24 hours  - Continue to trend BMP + Mg + Phos q6H  - Neurochecks q8 Due to hyponatremia, now at baseline mental status per wife and family. CT head negative for acute intracranial process, neuro exam wnl. Labs not clearly suggestive of volume depletion or SIADH. Sodium now 122, rapidly corrected from 114  - recheck sodium now and c/w IVF vs fluid restriction based on value  - Continue to trend BMP + Mg + Phos q6H  - Neurochecks q8

## 2019-06-22 NOTE — PROGRESS NOTE ADULT - PROBLEM SELECTOR PLAN 8
DVT ppx: holding in setting of ?bleed  Diet: Regular DVT ppx: holding in setting of hematuria  Diet: Regular  Dispo: likely home. Will see if pt able to ambulate by himself and if unable will get PT to evaluate pt.

## 2019-06-22 NOTE — H&P ADULT - NSICDXPASTSURGICALHX_GEN_ALL_CORE_FT
PAST SURGICAL HISTORY:  H/O partial nephrectomy right-12/17/12    Left varicocele     Melanoma in situ of back x1-1983, [id number]    Mohs defect mohs procedure bilateral sides of nose with skin graft from clavicular areas 7/2016    S/P cystoscopy TURBT-05/2012    S/P cystoscopy 5/15/14

## 2019-06-22 NOTE — CHART NOTE - NSCHARTNOTEFT_GEN_A_CORE
I personally discussed with Dr. Danny Beard (cell # [phone]), the pt's outpt heme/onc physician who recommended to continue with Jakafi. Relayed this to the pharmacy who approved a one time dose for tonight and the pharmacy requests the day team to evaluate the pt to continue the Jakafi.    Tree Archuleta PGY1  [id number]

## 2019-06-22 NOTE — H&P ADULT - NSHPPHYSICALEXAM_GEN_ALL_CORE
.  VITAL SIGNS:  T(C): 36.7 (06-22-19 @ 02:32), Max: 37 (06-21-19 @ 18:07)  T(F): 98.1 (06-22-19 @ 02:32), Max: 98.6 (06-21-19 @ 18:07)  HR: 57 (06-22-19 @ 02:32) (55 - 70)  BP: 108/62 (06-22-19 @ 02:32) (106/51 - 125/64)  BP(mean): --  RR: 17 (06-22-19 @ 02:32) (17 - 50)  SpO2: 97% (06-22-19 @ 02:32) (95% - 100%)  Wt(kg): --    PHYSICAL EXAM:    Constitutional: WDWN resting comfortably in bed; NAD  Head: NC/AT  Eyes: PERRLA, EOMI, pale conjunctiva  ENT: no nasal discharge; no oropharyngeal erythema or exudates; dry oral mucosa  Neck: supple; no JVD or thyromegaly  Respiratory: CTA B/L; no W/R/R, no retractions  Cardiac: +S1/S2; RRR; no M/R/G; PMI non-displaced  Gastrointestinal: soft, NT/ND; no rebound or guarding  : oates in place draining blood tinged urine  Extremities: WWP, pale, no edema  Vascular: 2+ radial, DP/PT pulses B/L  Lymphatic: no submandibular or cervical LAD  Neurologic: AAOx3; CNII-XII grossly intact; no focal deficits .  VITAL SIGNS:  T(C): 36.7 (06-22-19 @ 02:32), Max: 37 (06-21-19 @ 18:07)  T(F): 98.1 (06-22-19 @ 02:32), Max: 98.6 (06-21-19 @ 18:07)  HR: 57 (06-22-19 @ 02:32) (55 - 70)  BP: 108/62 (06-22-19 @ 02:32) (106/51 - 125/64)  BP(mean): --  RR: 17 (06-22-19 @ 02:32) (17 - 50)  SpO2: 97% (06-22-19 @ 02:32) (95% - 100%)  Wt(kg): --    PHYSICAL EXAM:    Constitutional: WDWN resting comfortably in bed; NAD  Head: NC/AT  Eyes: PERRLA, EOMI, pale conjunctiva  ENT: no nasal discharge; no oropharyngeal erythema or exudates; dry oral mucosa  Neck: supple; no JVD or thyromegaly  Respiratory: CTA B/L; no W/R/R, no retractions  Cardiac: +S1/S2; bradycardic  Gastrointestinal: soft, NT/ND; no rebound or guarding  : oates in place draining blood tinged urine  Extremities: WWP, pale, no edema  Vascular: 2+ radial, DP/PT pulses B/L  Lymphatic: no submandibular or cervical LAD  Neurologic: AAOx4; CNII-XII grossly intact; no focal deficits, .  VITAL SIGNS:  T(C): 36.7 (06-22-19 @ 02:32), Max: 37 (06-21-19 @ 18:07)  T(F): 98.1 (06-22-19 @ 02:32), Max: 98.6 (06-21-19 @ 18:07)  HR: 57 (06-22-19 @ 02:32) (55 - 70)  BP: 108/62 (06-22-19 @ 02:32) (106/51 - 125/64)  BP(mean): --  RR: 17 (06-22-19 @ 02:32) (17 - 50)  SpO2: 97% (06-22-19 @ 02:32) (95% - 100%)  Wt(kg): --    PHYSICAL EXAM:    Constitutional: WDWN resting comfortably in bed; NAD  Eyes: PERRL, EOMI, pale conjunctiva  ENT: no nasal discharge; no oropharyngeal erythema or exudates; dry oral mucosa  Neck: supple; no JVD or thyromegaly  Respiratory: CTA B/L; no W/R/R, no retractions  Cardiac: +S1/S2; bradycardic  Gastrointestinal: soft, NT/ND; no rebound or guarding  : oates in place draining blood tinged urine  Extremities: WWP, pale, no edema  Vascular: 2+ radial, DP/PT pulses B/L  Lymphatic: no submandibular or cervical LAD  Neurologic: AAOx4; CNII-XII grossly intact; no focal deficits,

## 2019-06-22 NOTE — H&P ADULT - PROBLEM SELECTOR PLAN 3
Pt's hemoglobin 8.8 on admission, down from baseline of ~12, most likely 2/2 Most likely 2/2 hematuria in setting of recent indwelling catheter placement and recent instrumentation. Vital signs are stable. Pt on Xarelto for atrial fibrillation.   - check CBC Q12H  - c/w xarelto Pt's hemoglobin 8.8 on admission, down from baseline of ~12, most likely 2/2 Most likely 2/2 hematuria in setting of recent indwelling catheter placement and recent instrumentation. Vital signs are stable. Pt on Xarelto for atrial fibrillation.   - recheck CBC now and c/w xarelto with CBC Q12H if stable, hold if hgb downtrending as active bleed is contraindication Pt's hemoglobin 8.8 on admission, down from baseline of ~12, most likely 2/2 hematuria in setting of recent instrumentation and xarelto use. Vital signs are stable. Pt on Xarelto for atrial fibrillation.   - recheck CBC now, monitor CBC Q12H and if H/H stable then would re-start xarelto, hold if hgb downtrending

## 2019-06-22 NOTE — CONSULT NOTE ADULT - SUBJECTIVE AND OBJECTIVE BOX
CHIEF COMPLAINT:    HPI: 64yM w/pmhx afib on xarelto, polycythemia vera, HTN, HLD, hx renal cell carcinoma, hx bladder cancer presenting with hyperventilation. Ptw wife is providing history as pt is currently hyperventilating. She states his urologist placed a oates this morning due to urinary retention following bladder biopsy yesterday after a lesion was seen in the bladder one month ago when attempting botox to treat urinary frequency. Today he suddenly developed hyperventilation around 2pm, she was able to control his breathing but he had 2 subsequent episodes of hyperventilation with shaking. Associated pt has hematuria. Pt denies chest pain, abd pain, n/v/d, cough, URI symptoms, fever/chills or any other concerns.    	Uro: sravanthi    Patient with history of afib on Xarelto (last dose last night), PV, HTN, HLD, RCC s/p partial nephrectomy, BCC, melanoma, and newly diagnosed bladder cancer initially presenting with hyperventilation Patient had bladder biopsy yesterday with Dr. Love. About 12 hours after had feelings of urinary retention, bladder fullness, and urinary dribbling. He then endorses "panic attack" in his own words,  hyperventilation, agitation. Per wife, patient would ask a question and he would respond inappropriately They called Dr. Love and went to the outpatient urologist office, found to have urinary retention oates placed 500cc removed. Endorses suprapubic TTP and increased UO with oates in place and gross hematuria. He was told to come into  ED. Patient does endorse hematuria an dysuria.     PAST MEDICAL & SURGICAL HISTORY:  Polycythemia: vera  Basal cell carcinoma: nose  Renal cell carcinoma  Peripheral neuropathy: &quot;both feet&quot;  Cataract of right eye  Bulging: cervical disc  Plantar fasciitis, bilateral  H/O atrial fibrillation without current medication: occurred post op 12/2012, pt. was on Cardizem x1 month afterwards  Melanoma in situ of skin of trunk  BPH (benign prostatic hypertrophy)  Psoriasis (a type of skin inflammation)  Degenerated intervertebral disc: lumbar  H/O polycythemia vera  Hyperlipidemia  HTN (hypertension)  Mohs defect: mohs procedure bilateral sides of nose with skin graft from clavicular areas 7/2016  S/P cystoscopy: 5/15/14  H/O partial nephrectomy: right-12/17/12  Melanoma in situ of back: x1-1983, [id number]  S/P cystoscopy: TURBT-05/2012  Left varicocele      FAMILY HISTORY:  Family history of diabetes mellitus (Sibling)  Family history of kidney disease (Mother)  Family history of CHF (congestive heart failure) (Father, Mother)      Allergies    Cipro (Rash)    Intolerances        HOME MEDICATIONS:  Home Medications:  Co-Q10: 1 cap oral dialy. last dose 6/12/19 (20 Jun 2019 16:04)  dilTIAZem 240 mg/24 hours oral capsule, extended release: 1 cap(s) orally once a day (20 Jun 2019 16:04)  hydrOXYzine hydrochloride 10 mg oral tablet: 3 tab(s) orally 2 times a day (20 Jun 2019 16:04)  irbesartan 75 mg oral tablet: 1 tab(s) orally once a day (20 Jun 2019 16:04)  Jakafi 15 mg oral tablet: 1 tab(s) orally 2 times a day (20 Jun 2019 16:04)  Multiple Vitamins oral tablet: 1 tab(s) orally once a day.last dose 6/12/19 (20 Jun 2019 16:04)  Omega-3 oral capsule: 1 tab oral daily. last dose 6/12/19 (20 Jun 2019 16:04)  Vitamin D3: 1 tab oral daily. (20 Jun 2019 16:04)  Xarelto 20 mg oral tablet: 1 tab(s) orally once a day (in the evening)  last dose 6/17/19 pm (20 Jun 2019 16:04)      REVIEW OF SYSTEMS:  Constitutional: [x ] negative [ ] fevers [ ] chills [ ] weight loss [ ] weight gain  HEENT: [x ] negative [ ] dry eyes [ ] eye irritation [ ] postnasal drip [ ] nasal congestion  CV: [ x] negative  [ ] chest pain [ ] orthopnea [ ] palpitations [ ] murmur  Resp: [x ] negative [ ] cough [ ] shortness of breath [ ] dyspnea [ ] wheezing [ ] sputum [ ] hemoptysis  GI: [ ] negative [ ] nausea [ ] vomiting [ ] diarrhea [ ] constipation [x ] abd pain [ ] dysphagia   : [ ] negative [x ] dysuria [ ] nocturia [x ] hematuria [ ] increased urinary frequency  Musculoskeletal: [ x] negative [ ] back pain [ ] myalgias [ ] arthralgias [ ] fracture  Skin: [ x] negative [ ] rash [ ] itch  Neurological: [ ] negative [ ] headache [ ] dizziness [ ] syncope [ ] weakness [ ] numbness  Psychiatric: [x ] negative [ ] anxiety [ ] depression  Endocrine: [ x] negative [ ] diabetes [ ] thyroid problem  Hematologic/Lymphatic: [ ] negative [ ] anemia [ ] bleeding problem  Allergic/Immunologic: [ ] negative [ ] itchy eyes [ ] nasal discharge [ ] hives [ ] angioedema  [ ] All other systems negative  [ ] Unable to assess ROS because ________    OBJECTIVE:  ICU Vital Signs Last 24 Hrs  T(C): 37 (21 Jun 2019 18:07), Max: 37 (21 Jun 2019 18:07)  T(F): 98.6 (21 Jun 2019 18:07), Max: 98.6 (21 Jun 2019 18:07)  HR: 58 (21 Jun 2019 22:01) (55 - 70)  BP: 106/51 (21 Jun 2019 22:01) (106/51 - 125/64)  BP(mean): --  ABP: --  ABP(mean): --  RR: 18 (21 Jun 2019 22:01) (18 - 50)  SpO2: 100% (21 Jun 2019 22:01) (95% - 100%)        CAPILLARY BLOOD GLUCOSE    · Constitutional        Lying in bed comfortably. No acute distress  · HEENT	               PERRL; EOMI; conjunctiva clear  · Neck	               Supple; no JVD  · Back	                ROM intact  · Respiratory             good air movement; no rales; no rhonchi; no wheezes  · Cardiovascular       S1 & S2 with RRR; no murmurs, rales or JVD  · Gastrointestinal     soft; no distention; bowel sounds normal; no rigidity  · Extremities             no pedal edema  · Vascular	               Radial Pulse: right normal; left normal  · Neurological           alert and oriented x 3; grossly nonfocal, able to comprehends, has difficulty with short memory  · Skin	               warm and dry    LINES:     HOSPITAL MEDICATIONS:  Standing Meds:      PRN Meds:      LABS:                        10.0   8.23  )-----------( 248      ( 21 Jun 2019 18:51 )             29.4     Hgb Trend: 10.0<--  06-21    114<LL>  |  86<L>  |  17  ----------------------------<  125<H>  5.3   |  17<L>  |  0.95    Ca    8.6      21 Jun 2019 20:45  Phos  3.8     06-21  Mg     1.5     06-21    TPro  5.8<L>  /  Alb  3.8  /  TBili  1.1  /  DBili  x   /  AST  28  /  ALT  18  /  AlkPhos  41  06-21    Creatinine Trend: 0.95<--, 1.01<--  PT/INR - ( 21 Jun 2019 18:51 )   PT: 18.6 SEC;   INR: 1.65          PTT - ( 21 Jun 2019 18:51 )  PTT:28.2 SEC

## 2019-06-22 NOTE — PROGRESS NOTE ADULT - PROBLEM SELECTOR PLAN 6
Normotensive on admission with downtrending H/H  - Hold home ARB in setting of bleeding, add back as clinically indicated

## 2019-06-22 NOTE — PROGRESS NOTE ADULT - PROBLEM SELECTOR PLAN 3
Pt's hemoglobin 8.8 on admission, down from baseline of ~12, most likely 2/2 hematuria in setting of recent instrumentation and xarelto use. Vital signs are stable. Pt on Xarelto for atrial fibrillation.   - recheck CBC now, monitor CBC Q12H and if H/H stable then would re-start xarelto, hold if hgb downtrending Pt's hemoglobin 8.8 on admission, down from baseline of ~12, most likely 2/2 hematuria in setting of recent instrumentation and xarelto use. Vital signs are stable. Pt on Xarelto for atrial fibrillation.   -  CBC Q12H and if H/H stable then would re-start xarelto Pt's hemoglobin 8.8 on admission, down from baseline of ~12, most likely 2/2 hematuria in setting of recent instrumentation and xarelto use. Vital signs are stable. Pt on Xarelto for atrial fibrillation.   - CBC Q12H and if H/H stable then would re-start xarelto  - Pending urology input re: when to restart AC

## 2019-06-23 ENCOUNTER — TRANSCRIPTION ENCOUNTER (OUTPATIENT)
Age: 64
End: 2019-06-23

## 2019-06-23 LAB
ANION GAP SERPL CALC-SCNC: 10 MMO/L — SIGNIFICANT CHANGE UP (ref 7–14)
ANION GAP SERPL CALC-SCNC: 10 MMO/L — SIGNIFICANT CHANGE UP (ref 7–14)
ANION GAP SERPL CALC-SCNC: 11 MMO/L — SIGNIFICANT CHANGE UP (ref 7–14)
ANION GAP SERPL CALC-SCNC: 11 MMO/L — SIGNIFICANT CHANGE UP (ref 7–14)
BUN SERPL-MCNC: 13 MG/DL — SIGNIFICANT CHANGE UP (ref 7–23)
BUN SERPL-MCNC: 15 MG/DL — SIGNIFICANT CHANGE UP (ref 7–23)
BUN SERPL-MCNC: 18 MG/DL — SIGNIFICANT CHANGE UP (ref 7–23)
BUN SERPL-MCNC: 19 MG/DL — SIGNIFICANT CHANGE UP (ref 7–23)
CALCIUM SERPL-MCNC: 8.2 MG/DL — LOW (ref 8.4–10.5)
CALCIUM SERPL-MCNC: 8.6 MG/DL — SIGNIFICANT CHANGE UP (ref 8.4–10.5)
CALCIUM SERPL-MCNC: 9.1 MG/DL — SIGNIFICANT CHANGE UP (ref 8.4–10.5)
CALCIUM SERPL-MCNC: 9.4 MG/DL — SIGNIFICANT CHANGE UP (ref 8.4–10.5)
CHLORIDE SERPL-SCNC: 105 MMOL/L — SIGNIFICANT CHANGE UP (ref 98–107)
CHLORIDE SERPL-SCNC: 106 MMOL/L — SIGNIFICANT CHANGE UP (ref 98–107)
CK MB BLD-MCNC: 3.08 NG/ML — SIGNIFICANT CHANGE UP (ref 1–6.6)
CK MB BLD-MCNC: SIGNIFICANT CHANGE UP (ref 0–2.5)
CK SERPL-CCNC: 138 U/L — SIGNIFICANT CHANGE UP (ref 30–200)
CO2 SERPL-SCNC: 20 MMOL/L — LOW (ref 22–31)
CO2 SERPL-SCNC: 20 MMOL/L — LOW (ref 22–31)
CO2 SERPL-SCNC: 22 MMOL/L — SIGNIFICANT CHANGE UP (ref 22–31)
CO2 SERPL-SCNC: 23 MMOL/L — SIGNIFICANT CHANGE UP (ref 22–31)
CORTIS SERPL-MCNC: 13.1 UG/DL — SIGNIFICANT CHANGE UP (ref 2.7–18.4)
CREAT SERPL-MCNC: 1.07 MG/DL — SIGNIFICANT CHANGE UP (ref 0.5–1.3)
CREAT SERPL-MCNC: 1.08 MG/DL — SIGNIFICANT CHANGE UP (ref 0.5–1.3)
CREAT SERPL-MCNC: 1.15 MG/DL — SIGNIFICANT CHANGE UP (ref 0.5–1.3)
CREAT SERPL-MCNC: 1.22 MG/DL — SIGNIFICANT CHANGE UP (ref 0.5–1.3)
GLUCOSE SERPL-MCNC: 113 MG/DL — HIGH (ref 70–99)
GLUCOSE SERPL-MCNC: 116 MG/DL — HIGH (ref 70–99)
GLUCOSE SERPL-MCNC: 135 MG/DL — HIGH (ref 70–99)
GLUCOSE SERPL-MCNC: 172 MG/DL — HIGH (ref 70–99)
HCT VFR BLD CALC: 30 % — LOW (ref 39–50)
HCV AB S/CO SERPL IA: 0.08 S/CO — SIGNIFICANT CHANGE UP (ref 0–0.99)
HCV AB SERPL-IMP: SIGNIFICANT CHANGE UP
HGB BLD-MCNC: 9.8 G/DL — LOW (ref 13–17)
MAGNESIUM SERPL-MCNC: 1.9 MG/DL — SIGNIFICANT CHANGE UP (ref 1.6–2.6)
MAGNESIUM SERPL-MCNC: 2 MG/DL — SIGNIFICANT CHANGE UP (ref 1.6–2.6)
MCHC RBC-ENTMCNC: 27.4 PG — SIGNIFICANT CHANGE UP (ref 27–34)
MCHC RBC-ENTMCNC: 32.7 % — SIGNIFICANT CHANGE UP (ref 32–36)
MCV RBC AUTO: 83.8 FL — SIGNIFICANT CHANGE UP (ref 80–100)
NRBC # FLD: 0 K/UL — SIGNIFICANT CHANGE UP (ref 0–0)
PHOSPHATE SERPL-MCNC: 2.5 MG/DL — SIGNIFICANT CHANGE UP (ref 2.5–4.5)
PHOSPHATE SERPL-MCNC: 2.8 MG/DL — SIGNIFICANT CHANGE UP (ref 2.5–4.5)
PHOSPHATE SERPL-MCNC: 2.9 MG/DL — SIGNIFICANT CHANGE UP (ref 2.5–4.5)
PHOSPHATE SERPL-MCNC: 3 MG/DL — SIGNIFICANT CHANGE UP (ref 2.5–4.5)
PLATELET # BLD AUTO: 166 K/UL — SIGNIFICANT CHANGE UP (ref 150–400)
PMV BLD: 11.2 FL — SIGNIFICANT CHANGE UP (ref 7–13)
POTASSIUM SERPL-MCNC: 3.6 MMOL/L — SIGNIFICANT CHANGE UP (ref 3.5–5.3)
POTASSIUM SERPL-MCNC: 3.8 MMOL/L — SIGNIFICANT CHANGE UP (ref 3.5–5.3)
POTASSIUM SERPL-MCNC: 4 MMOL/L — SIGNIFICANT CHANGE UP (ref 3.5–5.3)
POTASSIUM SERPL-MCNC: 4.1 MMOL/L — SIGNIFICANT CHANGE UP (ref 3.5–5.3)
POTASSIUM SERPL-SCNC: 3.6 MMOL/L — SIGNIFICANT CHANGE UP (ref 3.5–5.3)
POTASSIUM SERPL-SCNC: 3.8 MMOL/L — SIGNIFICANT CHANGE UP (ref 3.5–5.3)
POTASSIUM SERPL-SCNC: 4 MMOL/L — SIGNIFICANT CHANGE UP (ref 3.5–5.3)
POTASSIUM SERPL-SCNC: 4.1 MMOL/L — SIGNIFICANT CHANGE UP (ref 3.5–5.3)
RBC # BLD: 3.58 M/UL — LOW (ref 4.2–5.8)
RBC # FLD: 14.8 % — HIGH (ref 10.3–14.5)
SODIUM SERPL-SCNC: 136 MMOL/L — SIGNIFICANT CHANGE UP (ref 135–145)
SODIUM SERPL-SCNC: 136 MMOL/L — SIGNIFICANT CHANGE UP (ref 135–145)
SODIUM SERPL-SCNC: 138 MMOL/L — SIGNIFICANT CHANGE UP (ref 135–145)
SODIUM SERPL-SCNC: 138 MMOL/L — SIGNIFICANT CHANGE UP (ref 135–145)
TROPONIN T, HIGH SENSITIVITY: 13 NG/L — SIGNIFICANT CHANGE UP (ref ?–14)
WBC # BLD: 3.83 K/UL — SIGNIFICANT CHANGE UP (ref 3.8–10.5)
WBC # FLD AUTO: 3.83 K/UL — SIGNIFICANT CHANGE UP (ref 3.8–10.5)

## 2019-06-23 PROCEDURE — 99233 SBSQ HOSP IP/OBS HIGH 50: CPT | Mod: GC

## 2019-06-23 RX ORDER — DOCUSATE SODIUM 100 MG
100 CAPSULE ORAL DAILY
Refills: 0 | Status: DISCONTINUED | OUTPATIENT
Start: 2019-06-23 | End: 2019-06-24

## 2019-06-23 RX ORDER — SODIUM CHLORIDE 9 MG/ML
1000 INJECTION, SOLUTION INTRAVENOUS
Refills: 0 | Status: DISCONTINUED | OUTPATIENT
Start: 2019-06-23 | End: 2019-06-23

## 2019-06-23 RX ORDER — SENNA PLUS 8.6 MG/1
2 TABLET ORAL AT BEDTIME
Refills: 0 | Status: DISCONTINUED | OUTPATIENT
Start: 2019-06-23 | End: 2019-06-24

## 2019-06-23 RX ORDER — DILTIAZEM HCL 120 MG
30 CAPSULE, EXT RELEASE 24 HR ORAL EVERY 6 HOURS
Refills: 0 | Status: DISCONTINUED | OUTPATIENT
Start: 2019-06-23 | End: 2019-06-23

## 2019-06-23 RX ORDER — ATROPA BELLADONNA AND OPIUM 16.2; 6 MG/1; MG/1
1 SUPPOSITORY RECTAL EVERY 8 HOURS
Refills: 0 | Status: DISCONTINUED | OUTPATIENT
Start: 2019-06-23 | End: 2019-06-24

## 2019-06-23 RX ADMIN — Medication 25 MILLIGRAM(S): at 17:44

## 2019-06-23 RX ADMIN — Medication 1 TABLET(S): at 11:18

## 2019-06-23 RX ADMIN — Medication 25 MILLIGRAM(S): at 08:58

## 2019-06-23 RX ADMIN — Medication 1 TABLET(S): at 11:59

## 2019-06-23 RX ADMIN — Medication 3 MILLIGRAM(S): at 21:09

## 2019-06-23 RX ADMIN — SODIUM CHLORIDE 100 MILLILITER(S): 9 INJECTION, SOLUTION INTRAVENOUS at 10:25

## 2019-06-23 RX ADMIN — SODIUM CHLORIDE 175 MILLILITER(S): 9 INJECTION, SOLUTION INTRAVENOUS at 05:22

## 2019-06-23 RX ADMIN — RIVAROXABAN 20 MILLIGRAM(S): KIT at 21:09

## 2019-06-23 NOTE — PROGRESS NOTE ADULT - SUBJECTIVE AND OBJECTIVE BOX
[facility] DIVISION OF KIDNEY DISEASES AND HYPERTENSION -- FOLLOW UP NOTE  --------------------------------------------------------------------------------  Chief Complaint: hyponatremia    24 hour events/subjective:  Pt states he feels well.  No acute complaint.      PAST HISTORY  --------------------------------------------------------------------------------  No significant changes to PMH, PSH, FHx, SHx, unless otherwise noted    ALLERGIES & MEDICATIONS  --------------------------------------------------------------------------------  Allergies    Cipro (Rash)    Intolerances      Standing Inpatient Medications  dextrose 5%. 1000 milliLiter(s) IV Continuous <Continuous>  docusate sodium 100 milliGRAM(s) Oral daily  hydrOXYzine hydrochloride 25 milliGRAM(s) Oral two times a day  Jakafi (Ruxolitinib) 15 mg tablet 1 Tablet(s) 15 milliGRAM(s) Oral two times a day  melatonin 3 milliGRAM(s) Oral at bedtime  multivitamin 1 Tablet(s) Oral daily  rivaroxaban 20 milliGRAM(s) Oral with dinner  senna 2 Tablet(s) Oral at bedtime    PRN Inpatient Medications  acetaminophen   Tablet .. 650 milliGRAM(s) Oral every 6 hours PRN  aluminum hydroxide/magnesium hydroxide/simethicone Suspension 30 milliLiter(s) Oral every 6 hours PRN  belladonna 16.2 mG/opium 30 mg Suppository 1 Suppository(s) Rectal every 8 hours PRN  calcium carbonate    500 mG (Tums) Chewable 1 Tablet(s) Chew every 6 hours PRN      REVIEW OF SYSTEMS  --------------------------------------------------------------------------------  gen; no fever, chills  resp; no dyspnea  cvs; no cp  neuro; no headache, dizziness  gi; no n/v/d  msk; no edema    VITALS/PHYSICAL EXAM  --------------------------------------------------------------------------------  T(C): 36.9 (06-23-19 @ 05:10), Max: 36.9 (06-23-19 @ 05:10)  HR: 64 (06-23-19 @ 05:10) (64 - 70)  BP: 109/62 (06-23-19 @ 05:10) (109/62 - 140/68)  RR: 18 (06-23-19 @ 05:10) (17 - 18)  SpO2: 98% (06-23-19 @ 05:10) (98% - 98%)  Wt(kg): --  Height (cm): 187.96 (06-22-19 @ 21:01)  Weight (kg): 104.5 (06-22-19 @ 21:01)  BMI (kg/m2): 29.6 (06-22-19 @ 21:01)  BSA (m2): 2.31 (06-22-19 @ 21:01)      06-22-19 @ 07:01  -  06-23-19 @ 07:00  --------------------------------------------------------  IN: 2285 mL / OUT: 4600 mL / NET: -2315 mL    06-23-19 @ 07:01  -  06-23-19 @ 12:18  --------------------------------------------------------  IN: 0 mL / OUT: 1800 mL / NET: -1800 mL      Physical Exam:  	Gen: NAD, well-appearing  	HEENT:  supple neck  	Pulm: CTA B/L  	CV: RRR, S1S2; no rub  	Abd: +BS, soft, nontender/nondistended  	: oates with pink urine  	UE: Warm,  no edema; no asterixis  	LE: Warm,  no edema  	Neuro: No focal deficits, intact gait  	Psych: Normal affect and mood  	Skin: Warm, without rashes      LABS/STUDIES  --------------------------------------------------------------------------------              9.8    3.83  >-----------<  166      [06-23-19 @ 07:36]              30.0     136  |  106  |  15  ----------------------------<  135      [06-23-19 @ 07:36]  3.6   |  20  |  1.07        Ca     8.6     [06-23-19 @ 07:36]      Mg     1.9     [06-23-19 @ 07:36]      Phos  3.0     [06-23-19 @ 07:36]    TPro  5.8  /  Alb  3.8  /  TBili  1.1  /  DBili  x   /  AST  28  /  ALT  18  /  AlkPhos  41  [06-21-19 @ 18:51]    PT/INR: PT 18.6 , INR 1.65       [06-21-19 @ 18:51]  PTT: 28.2       [06-21-19 @ 18:51]    Uric acid 3.5      [06-22-19 @ 01:27]  Serum Osmolality 295      [06-22-19 @ 18:40]    Creatinine Trend:  SCr 1.07 [06-23 @ 07:36]  SCr 1.15 [06-23 @ 01:35]  SCr 1.12 [06-22 @ 18:40]  SCr 1.09 [06-22 @ 12:45]  SCr 1.03 [06-22 @ 07:30]    Urinalysis - [06-22-19 @ 08:55]      Color COLORLESS / Appearance CLEAR / SG 1.004 / pH 6.5      Gluc NEGATIVE / Ketone NEGATIVE  / Bili NEGATIVE / Urobili NORMAL       Blood LARGE / Protein 50 / Leuk Est MODERATE / Nitrite NEGATIVE      RBC 3-5 / WBC 11-25 / Hyaline NEGATIVE / Gran  / Sq Epi OCC / Non Sq Epi  / Bacteria NEGATIVE    Urine Sodium 25      [06-21-19 @ 20:45]  Urine Potassium 27.9      [06-21-19 @ 20:45]  Urine Chloride < 20      [06-21-19 @ 20:45]  Urine Osmolality 337      [06-21-19 @ 20:45]    TSH 1.97      [06-22-19 @ 01:27]

## 2019-06-23 NOTE — PROGRESS NOTE ADULT - ASSESSMENT
64 year old male with a medical history significant for Afib on Xarelto, polycythemia vera, HTN, HLD, hx renal cell carcinoma s/p right partial nephrectomy 2012, hx bladder cancer presenting to the ED with hyperventilation and expressive aphasia, Urology consult called for gross hematuria in setting of recent TURBT.     -- oates output clear yellow on xarelto -> recommend TOV tonight with a post void residual bladder scan  -- appreciate medical and renal recs re: hyponatremia -> now resolved  -- ditropan 5mg q8 or belladonna and opium suppositories PRN bladder spasms  -- recommend senna and colace if taking ditropan to prevent constipation  -- to f/u with Dr. Love at the [facility] [phone]

## 2019-06-23 NOTE — PROGRESS NOTE ADULT - ASSESSMENT
64M PMH Afib on Xarelto, polycythemia vera on Jakafi, HTN, HLD, hx renal cell carcinoma s/p right partial nephrectomy 2012, recently dx bladder cancer s/p TURBT (6/20) and irrigation with sterile water for hematuria (6/21) presenting to the ED with hyperventilation and expressive aphasia 2/2 acute metabolic encephalopathy from symptomatic hyponatremia.

## 2019-06-23 NOTE — PROGRESS NOTE ADULT - PROBLEM SELECTOR PLAN 4
CHADSVASC=1 (HTN hx), currently in sinus bradycardia with possible active bleed.  - C/w Xarelto as above  - Hold cardizem as HR 50-60 on tele overnight.

## 2019-06-23 NOTE — PROGRESS NOTE ADULT - PROBLEM SELECTOR PLAN 8
DVT ppx: holding in setting of hematuria  Diet: Regular  Dispo: likely home. Will see if pt able to ambulate by himself and if unable will get PT to evaluate pt.

## 2019-06-23 NOTE — PROGRESS NOTE ADULT - PROBLEM SELECTOR PLAN 5
With recent instrumentation; urology evaluated pt in ED, no urgent urologic intervention indicated.  - Plan for TOV 6/24

## 2019-06-23 NOTE — PROGRESS NOTE ADULT - PROBLEM SELECTOR PLAN 7
On Jakafi  - nonformulary at hospital, pts family will bring in from home and then resume 15mg BID  - Discussed with outpt heme/onc Dr. Beard and he recommends to continue with Jakafi.  - Heme/onc consulted for approval per pharmacy On Jakafi  - nonformulary at hospital, pts family will bring in from home and then resume 15mg BID  - Discussed with outpt heme/onc Dr. Beard and he recommends to continue with Jakafi.  - Heme/onc documented approval for pharmacy, med to be verified by pharmacy

## 2019-06-23 NOTE — DISCHARGE NOTE PROVIDER - PROVIDER TOKENS
FREE:[LAST:[Kisha],FIRST:[Danny],PHONE:[(   )    -],FAX:[(   )    -],FOLLOWUP:[Routine]] PROVIDER:[TOKEN:[[id number]],FOLLOWUP:[1 week]],FREE:[LAST:[Kisha],FIRST:[Danny],PHONE:[(   )    -],FAX:[(   )    -],FOLLOWUP:[Routine]],PROVIDER:[TOKEN:[[id number]],FOLLOWUP:[1-3 days]]

## 2019-06-23 NOTE — PROGRESS NOTE ADULT - PROBLEM SELECTOR PLAN 2
Serum osm 263 with urine Na+ > 20, suspect hyponatremia 2/2 TURBT and irrigation  - BMP Q6H  - Cortisol low, however not hypotensive, hypothermic and potassium wnl, unlikely to have hyponatremia 2/2 adrenal insufficency  - pt has oates, strict ins and outs  - f/u UA to r/o infection  - Suspect acute hyponatremia 2/2 irrigation, corrected rapidly from 114 to 130 (16 units in <24h). Holding off on D5w for now pending nephrology input  - D5w at 175cc/hr to maintain Na (138 -> 136 overnight at that rate) Serum osm 263 with urine Na+ > 20, suspect hyponatremia 2/2 TURBT and irrigation  - BMP Q6H  - Cortisol low, however not hypotensive, hypothermic and potassium wnl, unlikely to have hyponatremia 2/2 adrenal insufficency  - pt has oates, strict ins and outs  - f/u UA to r/o infection  - Suspect acute hyponatremia 2/2 irrigation, corrected rapidly from 114 to 138 (in <24h).  - D5w decreased to 100cc/hr to maintain Na (now at 136). Will repeat BMP in Q6h for now. Serum osm 263 with urine Na+ > 20, suspect hyponatremia 2/2 TURBT and irrigation  - BMP Q6H  - Cortisol low, repeat wnl  - pt has oates, strict ins and outs  - D5w decreased to 100cc/hr to maintain Na (now at 136). Will repeat BMP in Q6h for now. Serum osm 263 with urine Na+ > 20, suspect hyponatremia 2/2 TURBT and irrigation  - BMP Q8H  - Cortisol low, repeat wnl  - pt has oates, strict ins and outs  - D/C D5W gtt. Will repeat BMP in Q8h for now.

## 2019-06-23 NOTE — PROGRESS NOTE ADULT - SUBJECTIVE AND OBJECTIVE BOX
Tree Archuleta [phone]    Patient is a 64y old  Male who presents with a chief complaint of Hyponatremia (2019 18:38)    INTERVAL HPI/OVERNIGHT EVENTS:  Afebrile, saturating well overnight. NAEO. This AM pt reports feeling well, denies HA/blurry vision/SOB/chest pain. NAEO.    REVIEW OF SYSTEMS  Constitutional: [x] negative [ ] fevers [ ] chills [ ] weight loss [ ] weight gain  HEENT: [x] negative [ ] dry eyes [ ] eye irritation [ ] postnasal drip [ ] nasal congestion  CV: [x] negative  [ ] chest pain [ ] orthopnea [ ] palpitations [ ] murmur  Resp: [x] negative [ ] cough [ ] shortness of breath [ ] dyspnea [ ] wheezing [ ] sputum [ ] hemoptysis  GI: [x] negative [ ] nausea [ ] vomiting [ ] diarrhea [ ] constipation [ ] abd pain [ ] dysphagia   : [x] negative [ ] dysuria [ ] nocturia [ ] hematuria [ ] increased urinary frequency  Musculoskeletal: [x] negative [ ] back pain [ ] myalgias [ ] arthralgias [ ] fracture  Skin: [x] negative [ ] rash [ ] itch  Neurological: [x] negative [ ] headache [ ] dizziness [ ] syncope [ ] weakness [ ] numbness  Psychiatric: [x] negative [ ] anxiety [ ] depression  Endocrine: [x] negative [ ] diabetes [ ] thyroid problem  Hematologic/Lymphatic: [x] negative [ ] anemia [ ] coagulopathy  Allergic/Immunologic: [x] negative [ ] itchy eyes [ ] nasal discharge [ ] hives [ ] angioedema  [x] All other systems negative  [ ] Unable to assess ROS because ________      MEDICATIONS  (STANDING):  dextrose 5%. 1000 milliLiter(s) (175 mL/Hr) IV Continuous <Continuous>  hydrOXYzine hydrochloride 25 milliGRAM(s) Oral two times a day  Jakafi (Ruxolitinib) 15 mg tablet 1 Tablet(s)   Oral two times a day  melatonin 3 milliGRAM(s) Oral at bedtime  multivitamin 1 Tablet(s) Oral daily  rivaroxaban 20 milliGRAM(s) Oral with dinner    MEDICATIONS  (PRN):  acetaminophen   Tablet .. 650 milliGRAM(s) Oral every 6 hours PRN Temp greater or equal to 38C (100.4F), Mild Pain (1 - 3), Moderate Pain (4 - 6)  aluminum hydroxide/magnesium hydroxide/simethicone Suspension 30 milliLiter(s) Oral every 6 hours PRN Dyspepsia  belladonna 16.2 mG/opium 30 mg Suppository 1 Suppository(s) Rectal every 8 hours PRN Bladder spasm  calcium carbonate    500 mG (Tums) Chewable 1 Tablet(s) Chew every 6 hours PRN Indigestion    Allergies: Cipro (Rash)    PAST MEDICAL & SURGICAL HISTORY:  Polycythemia: vera  Basal cell carcinoma: nose  Renal cell carcinoma  Peripheral neuropathy: &quot;both feet&quot;  Cataract of right eye  Bulging: cervical disc  Plantar fasciitis, bilateral  H/O atrial fibrillation without current medication: occurred post op 2012, pt. was on Cardizem x1 month afterwards  Melanoma in situ of skin of trunk  BPH (benign prostatic hypertrophy)  Psoriasis (a type of skin inflammation)  Degenerated intervertebral disc: lumbar  H/O polycythemia vera  Hyperlipidemia  HTN (hypertension)  Mohs defect: mohs procedure bilateral sides of nose with skin graft from clavicular areas 2016  S/P cystoscopy: 5/15/14  H/O partial nephrectomy: right-12  Melanoma in situ of back: x1-, [id number]  S/P cystoscopy: TURBT-2012  Left varicocele      Vital Signs Last 24 Hrs  T(C): 36.9 (2019 05:10), Max: 36.9 (2019 09:23)  T(F): 98.4 (2019 05:10), Max: 98.4 (2019 09:23)  HR: 64 (2019 05:10) (61 - 70)  BP: 109/62 (2019 05:10) (102/56 - 140/68)  BP(mean): --  RR: 18 (2019 05:10) (17 - 18)  SpO2: 98% (2019 05:10) (98% - 99%)      In's and Outs:   19 @ 07:01  -  19 @ 07:00  --------------------------------------------------------  IN: 2285 mL / OUT: 4600 mL / NET: -2315 mL      PHYSICAL EXAMINATION:  GENERAL: The patient is awake and alert in no apparent distress.   HEENT: NCAT. EOMI. Mucous membranes are moist.  NECK: Supple. No JVD.  LUNGS: [x] Clear to auscultation b/l   [x] Unlabored breathing   [ ] Wheezing  [ ] Rales  [ ] Rhonchi  HEART: [x] Normal  [ ] Irregular rhythm  [ ] Tachycardia  [ ] Bradycardia   [ ] Systolic murmur  [ ] Diastolic murmur  [ ] Gallop   ABDOMEN: [x] Normal  [ ] Hard  [ ] Tender  [ ] Distended [ ] Bowel sounds  GENITOURINARY: [ ] Normal  [ ] Incontinent   [ ] Oliguria/Anuria   [x] Diana, Rosé colored urine  EXTREMITIES: [x] Normal  [ ] Cyanosis  [ ] Edema  NEUROLOGIC: [x] Grossly intact  [ ] Focal neurologic deficits  SKIN: [x] Normal  [ ] Rash   [ ] Ulcers  Musculoskeletal:  [x] Normal   [ ] Generalized weakness  [ ] Bedbound      LABS:                        11.1   4.24  )-----------( 182      ( 2019 18:40 )             34.0     Hgb Trend: 11.1<--, 10.6<--, 8.8<--, 10.0<--  06-23    136  |  105  |  19  ----------------------------<  172<H>  3.8   |  20<L>  |  1.15    Ca    8.2<L>      2019 01:35  Phos  2.5       Mg     1.9         TPro  5.8<L>  /  Alb  3.8  /  TBili  1.1  /  DBili  x   /  AST  28  /  ALT  18  /  AlkPhos  41  06-    Creatinine Trend: 1.15<--, 1.12<--, 1.09<--, 1.03<--, 1.02<--, 0.98<--    PT/INR - ( 2019 18:51 )   PT: 18.6 SEC;   INR: 1.65       PTT - ( 2019 18:51 )  PTT:28.2 SEC    Urinalysis Basic - ( 2019 08:55 )  Color: COLORLESS / Appearance: CLEAR / S.004 / pH: 6.5  Gluc: NEGATIVE / Ketone: NEGATIVE  / Bili: NEGATIVE / Urobili: NORMAL   Blood: LARGE / Protein: 50 / Nitrite: NEGATIVE   Leuk Esterase: MODERATE / RBC: 3-5 / WBC 11-25   Sq Epi: OCC / Non Sq Epi: x / Bacteria: NEGATIVE

## 2019-06-23 NOTE — PROGRESS NOTE ADULT - PROBLEM SELECTOR PLAN 6
Normotensive on admission with downtrending H/H  - Hold home ARB in setting of bleeding, add back as clinically indicated Normotensive on admission with downtrending H/H  - Hold home ARB in setting of bleeding, add back as clinically indicated. BPs well controlled so holding off for now

## 2019-06-23 NOTE — CHART NOTE - NSCHARTNOTEFT_GEN_A_CORE
64M PMH Afib on Xarelto, polycythemia vera on Jakafi, HTN, HLD, hx renal cell carcinoma s/p right partial nephrectomy 2012, recently dx bladder cancer s/p TURBT and irrigation on 6/20 presenting to the ED with hyperventilation and expressive aphasia 2/2 acute metabolic encephalopathy from symptomatic hyponatremia.  CT head neg.  Team discussed with primary oncologist Dr. Danny Beard ([phone]) and he recommended to continue Jakafi.  Agree with plan.      Chaya Mayen  Oncology Fellow  [phone] 64M PMH Afib on Xarelto, polycythemia vera on Jakafi, HTN, HLD, hx renal cell carcinoma s/p right partial nephrectomy 2012, recently dx bladder cancer s/p TURBT and irrigation on 6/20 presenting to the ED with hyperventilation and expressive aphasia 2/2 acute metabolic encephalopathy from symptomatic hyponatremia.  CT head neg. No evidence of infection, blood counts stable, not neutropenic. Team discussed with primary oncologist Dr. Danny Beard ([phone]) and he recommended to continue Jakafi.  Agree with plan.      Chaya Mayen  Oncology Fellow  [phone]

## 2019-06-23 NOTE — PROGRESS NOTE ADULT - PROBLEM SELECTOR PLAN 3
Pt's hemoglobin 8.8 on admission, down from baseline of ~12, most likely 2/2 hematuria in setting of recent instrumentation and xarelto use. Vital signs are stable. Pt on Xarelto for atrial fibrillation.   - CBC QD  - On Xarelto, H/H is stable Pt's hemoglobin 8.8 on admission, down from baseline of ~12, most likely 2/2 hematuria in setting of recent instrumentation and xarelto use. Vital signs are stable. Pt on Xarelto for atrial fibrillation.   - CBC QD, appears stable, likely downtrended in setting of IV hydration  - On Xarelto, H/H is stable Pt's hemoglobin 8.8 on admission, down from baseline of ~12, most likely 2/2 hematuria in setting of recent instrumentation and xarelto use. Vital signs are stable. Pt on Xarelto for atrial fibrillation.   - CBC QD, appears stable, likely downtrended in setting of IV hydration, will trend  - On Xarelto, H/H is stable

## 2019-06-23 NOTE — PROGRESS NOTE ADULT - PROBLEM SELECTOR PLAN 1
Due to hyponatremia, now at baseline mental status per wife and family. CT head negative for acute intracranial process, neuro exam wnl. Sodium 136, rapidly corrected from 114. Given acute hyponatremia risk for pontine demyelination is low  - Continue to trend BMP + Mg + Phos q6H  - Neurochecks q8

## 2019-06-23 NOTE — DISCHARGE NOTE PROVIDER - CARE PROVIDER_API CALL
Danny Beard  Phone: (   )    -  Fax: (   )    -  Follow Up Time: Routine Carter Love)  Urology  [address], [address]  [place] [address]  Phone: [phone]  Fax: [phone]  Follow Up Time: 1 week    Danny Beard  Phone: (   )    -  Fax: (   )    -  Follow Up Time: Routine    Chichi Lamas)  Cardiovascular Disease; Internal Medicine  [address], [address] [place] [address]  Phone: [phone]  Fax: [phone]  Follow Up Time: 1-3 days

## 2019-06-23 NOTE — PROGRESS NOTE ADULT - PROBLEM SELECTOR PLAN 1
Pt with ? acute, hypotonic, symptomatic, euvolemic hyponatremia. Pt previously with normal serum sodium levels. Last known sodium 141 in May. Pt had pre op labs earlier this week which were reportedly normal. Serum sodium low at 117 on presentation, corrected to 130 in ~12 hours. Hyponatremia likely in setting of excessive absorption of hypotonic solutions used in TURBT. Pt now on D5W, serum sodium stable at 136. Recommend to stop D5W and monitor BMP q8h. Pt with ? acute, hypotonic, symptomatic, euvolemic hyponatremia. Pt previously with normal serum sodium levels. Last known sodium 141 in May. Pt had pre op labs earlier this week which were reportedly normal. Serum sodium low at 117 on presentation, corrected to 130 in ~12 hours. Hyponatremia likely in setting of excessive absorption of hypotonic solutions used in TURBT and component of obstruction.  Pt now on D5W, serum sodium stable at 136. Recommend to stop D5W and monitor BMP q8h.

## 2019-06-23 NOTE — DISCHARGE NOTE PROVIDER - HOSPITAL COURSE
64M with history of P. Vera on Jakafi, pAFib on Xarelto, HTN, RCC s/p partial nephrectomy, and recently diagnosed Bladder cancer s/p TURBT (on 6/20) who presented to the hospital with acute onset of confusion on 6/21 due to hyponatremia likely due to hypotonic fluids during the procedure and post-procedure. As per patient's wife, he had the TURBT on 6/20 and after the procedure started to pass blood clots via his urethra and had an inability to completely void his bladder. He went back to his urologists office on 6/21 AM and there was noted to be retaining ~566cc of urine and had a oates placed and his bladder was irrigated with 500cc of sterile water. he was then sent home with the oates but soon after became very confused, was slurring his speech, and was therefore brought to the ED for an evaluation. On arrival to the ED he was noted to be hyponatremic to 117 and repeat BMP showed a decrease in his sodium to 114. He was then given 1L NS and repeat BMP after that showed his sodium increased to 122 with significant improvement in his mental status. The patient was back to his baseline mental status but states that he cannot fully remember the events that happened between Thursday night and Friday. In ED, initial VS were T98.6 HR70 /64 RR50 SaO2 95% on RA. Hemoglobin was 8.8 (baseline around 12). MICU was consulted but did not accept the patient. The pt's sodium corrected rapidly without further fluids to 138 within 24h and nephrology recommended to start D5W to target a Na of 130 on HD#1. The pt remained asymptomatic with his mental status at baseline throughout the rest of his hospital course. The pt was continued on his Jakafi after discussion with his outpatient hematologist.    Urology was following and made recommendations regarding the anticoagulation and management of the oates. 64M with history of polycythemia vera on Jakafi, pAFib on Xarelto, HTN, RCC s/p partial nephrectomy, and recently diagnosed bladder cancer s/p TURBT (on 6/20) who presented to the hospital with acute onset of confusion on 6/21 due to hyponatremia likely due to hypotonic fluids during the procedure and post-procedure. As per patient's wife, he had the TURBT on 6/20 and after the procedure started to pass blood clots via his urethra and had an inability to completely void his bladder. He went back to his urologists office on 6/21 AM and there was noted to be retaining ~566cc of urine and had a oates placed and his bladder was irrigated with 500cc of sterile water. he was then sent home with the oates but soon after became very confused, was slurring his speech, and was therefore brought to the ED for an evaluation. On arrival to the ED he was noted to be hyponatremic to 117 and repeat BMP showed a decrease in his sodium to 114. He was then given 1L NS and repeat BMP after that showed his sodium increased to 122 with significant improvement in his mental status. The patient was back to his baseline mental status but states that he cannot fully remember the events that happened between Thursday night and Friday. In ED, initial VS were T98.6 HR70 /64 RR50 SaO2 95% on RA. Hemoglobin was 8.8 (baseline around 12). MICU was consulted but did not accept the patient. The pt's sodium corrected rapidly without further fluids to 138 within 24h and nephrology recommended to start D5W to target a Na of 130 on hospital day 1. His sodium remained stable at 138 and IV fluids were discontinued the following day. His mental status remained at baseline throughout the remainder of his hospital course. He was continued on his home Jakafi and Xarelto after discussions with both his outpatient hematologist and the urology team, respectively. His Oates was removed on 6/23 for a trial of void, which he passed. He was medically stable for discharge on 6/24 with close urology follow up. 64M with history of polycythemia vera on Jakafi, pAFib on Xarelto, HTN, RCC s/p partial nephrectomy, and recently diagnosed bladder cancer s/p TURBT (on 6/20) who presented to the hospital with acute onset of confusion on 6/21 due to hyponatremia likely due to hypotonic fluids during the procedure and post-procedure. As per patient's wife, he had the TURBT on 6/20 and after the procedure started to pass blood clots via his urethra and had an inability to completely void his bladder. He went back to his urologists office on 6/21 AM and there was noted to be retaining ~566cc of urine and had a oates placed and his bladder was irrigated with 500cc of sterile water. he was then sent home with the oates but soon after became very confused, was slurring his speech, and was therefore brought to the ED for an evaluation. On arrival to the ED he was noted to be hyponatremic to 117 and repeat BMP showed a decrease in his sodium to 114. He was then given 1L NS and repeat BMP after that showed his sodium increased to 122 with significant improvement in his mental status. The patient was back to his baseline mental status but states that he cannot fully remember the events that happened between Thursday night and Friday. In ED, initial VS were T98.6 HR70 /64 RR50 SaO2 95% on RA. Hemoglobin was 8.8 (baseline around 12). MICU was consulted but did not accept the patient. The pt's sodium corrected rapidly without further fluids to 138 within 24h and nephrology recommended to start D5W to target a Na of 130 on hospital day 1. His sodium remained stable at 138 and IV fluids were discontinued the following day. His mental status remained at baseline throughout the remainder of his hospital course. He was continued on his home Jakafi and Xarelto after discussions with both his outpatient hematologist and the urology team, respectively. His Oates was removed on 6/23 for a trial of void, which he passed. He was medically stable for discharge on 6/24 with close urology and cardiology follow up.

## 2019-06-23 NOTE — PROGRESS NOTE ADULT - SUBJECTIVE AND OBJECTIVE BOX
Subjective    No acute events overnight  Adalgisa remains clear back on Xarelto  Na normalized    Objective    Vital signs  T(F): , Max: 98.4 (06-23-19 @ 05:10)  HR: 59 (06-23-19 @ 14:58)  BP: 132/71 (06-23-19 @ 14:58)  SpO2: 98% (06-23-19 @ 14:58)  Wt(kg): --    Output     06-22 @ 07:01  -  06-23 @ 07:00  --------------------------------------------------------  IN: 2285 mL / OUT: 4600 mL / NET: -2315 mL    06-23 @ 07:01  -  06-23 @ 15:57  --------------------------------------------------------  IN: 600 mL / OUT: 3150 mL / NET: -2550 mL    Physical  Gen NAD  Abd soft, NT, ND   oates secured, draining clear yellow urine    Labs      06-23 @ 13:20    WBC --    / Hct --    / SCr 1.08     06-23 @ 07:36    WBC 3.83  / Hct 30.0  / SCr 1.07       Urine Cx: Culture - Urine (06.13.19 @ 15:07)    Culture - Urine:   NO GROWTH AT 24 HOURS    Specimen Source: URINE MIDSTREAM      Imaging

## 2019-06-23 NOTE — DISCHARGE NOTE PROVIDER - NSDCCPCAREPLAN_GEN_ALL_CORE_FT
PRINCIPAL DISCHARGE DIAGNOSIS  Diagnosis: Hyponatremia  Assessment and Plan of Treatment: You came to the hospital with an altered mental status (metabolic encephalopathy) which was due to symptomatic hyponatremia or a low sodium level in your blood. This was treated with intravenous fluids and with close monitoring. The nephrologists were making recommendations about your fluids.      SECONDARY DISCHARGE DIAGNOSES  Diagnosis: Metabolic encephalopathy  Assessment and Plan of Treatment: See above    Diagnosis: Acute blood loss anemia  Assessment and Plan of Treatment: Your blood counts were slightly low when you were in the hospital and this was partly expected post-procedure. You were closely monitored for worsening anemia. You were also seen by the hematologists for continuation of your Jakafi.    Diagnosis: Atrial fibrillation  Assessment and Plan of Treatment: You had a history of atrial fibrillation for which you were on xarelto. We held your cardizem PRINCIPAL DISCHARGE DIAGNOSIS  Diagnosis: Hyponatremia  Assessment and Plan of Treatment: You came to the hospital with an altered mental status (metabolic encephalopathy) which was due to the low level of sodium in your blood. Your low sodium was most likely the result of the bladder irrigation that was done by your urologist which caused your body to absorb too much water, diluting the amount of sodium in your blood. Your sodium returned to normal and your levels were monitored to make sure they remained stable. Please make sure to follow up with your primary care doctor within 1 week of leaving the hospital to have your sodium checked and ensure you are feeling better.      SECONDARY DISCHARGE DIAGNOSES  Diagnosis: Acute blood loss anemia  Assessment and Plan of Treatment: Your blood counts were slightly low when you were in the hospital and this was partly to be expected post-procedure. Your blood counts (i.e., hemoglobin and hematocrit) were closely monitored and remained stable dudring your hospital stay. You may continue to take your home doses of Xarelto and Jakafi when you leave the hospital. Please call your urologist if you start to have increased bleeding with urination.    Diagnosis: Atrial fibrillation  Assessment and Plan of Treatment: Please continue to take your Xarelto and Cardizem medications as prescribed. Your heart was monitored while you were in the hospital. We found you to have a few seconds of an abnormal rhythm called wide complex tachycardia on the morning of 6/24/19 that was most likely related to lowering your dose of Cardizem in the hospital. Please continue taking your home dose of Cardizem and make a follow up appointment with your primary care doctor or your cardiologist within 1 week of discharge.

## 2019-06-24 ENCOUNTER — TRANSCRIPTION ENCOUNTER (OUTPATIENT)
Age: 64
End: 2019-06-24

## 2019-06-24 ENCOUNTER — APPOINTMENT (OUTPATIENT)
Dept: UROLOGY | Facility: CLINIC | Age: 64
End: 2019-06-24

## 2019-06-24 ENCOUNTER — MOBILE ON CALL (OUTPATIENT)
Age: 64
End: 2019-06-24

## 2019-06-24 VITALS
SYSTOLIC BLOOD PRESSURE: 114 MMHG | WEIGHT: 227.08 LBS | HEART RATE: 61 BPM | DIASTOLIC BLOOD PRESSURE: 73 MMHG | RESPIRATION RATE: 18 BRPM | OXYGEN SATURATION: 99 % | TEMPERATURE: 98 F

## 2019-06-24 LAB
ANION GAP SERPL CALC-SCNC: 12 MMO/L — SIGNIFICANT CHANGE UP (ref 7–14)
BUN SERPL-MCNC: 20 MG/DL — SIGNIFICANT CHANGE UP (ref 7–23)
CALCIUM SERPL-MCNC: 8.7 MG/DL — SIGNIFICANT CHANGE UP (ref 8.4–10.5)
CHLORIDE SERPL-SCNC: 103 MMOL/L — SIGNIFICANT CHANGE UP (ref 98–107)
CO2 SERPL-SCNC: 23 MMOL/L — SIGNIFICANT CHANGE UP (ref 22–31)
CREAT SERPL-MCNC: 1.13 MG/DL — SIGNIFICANT CHANGE UP (ref 0.5–1.3)
GLUCOSE SERPL-MCNC: 118 MG/DL — HIGH (ref 70–99)
HCT VFR BLD CALC: 30.3 % — LOW (ref 39–50)
HGB BLD-MCNC: 9.6 G/DL — LOW (ref 13–17)
MAGNESIUM SERPL-MCNC: 2 MG/DL — SIGNIFICANT CHANGE UP (ref 1.6–2.6)
MCHC RBC-ENTMCNC: 27.2 PG — SIGNIFICANT CHANGE UP (ref 27–34)
MCHC RBC-ENTMCNC: 31.7 % — LOW (ref 32–36)
MCV RBC AUTO: 85.8 FL — SIGNIFICANT CHANGE UP (ref 80–100)
NRBC # FLD: 0.02 K/UL — SIGNIFICANT CHANGE UP (ref 0–0)
PHOSPHATE SERPL-MCNC: 3.1 MG/DL — SIGNIFICANT CHANGE UP (ref 2.5–4.5)
PLATELET # BLD AUTO: 164 K/UL — SIGNIFICANT CHANGE UP (ref 150–400)
PMV BLD: 11.2 FL — SIGNIFICANT CHANGE UP (ref 7–13)
POTASSIUM SERPL-MCNC: 4.2 MMOL/L — SIGNIFICANT CHANGE UP (ref 3.5–5.3)
POTASSIUM SERPL-SCNC: 4.2 MMOL/L — SIGNIFICANT CHANGE UP (ref 3.5–5.3)
RBC # BLD: 3.53 M/UL — LOW (ref 4.2–5.8)
RBC # FLD: 15 % — HIGH (ref 10.3–14.5)
SODIUM SERPL-SCNC: 138 MMOL/L — SIGNIFICANT CHANGE UP (ref 135–145)
WBC # BLD: 3.45 K/UL — LOW (ref 3.8–10.5)
WBC # FLD AUTO: 3.45 K/UL — LOW (ref 3.8–10.5)

## 2019-06-24 PROCEDURE — 99239 HOSP IP/OBS DSCHRG MGMT >30: CPT

## 2019-06-24 RX ORDER — ATROPA BELLADONNA AND OPIUM 16.2; 6 MG/1; MG/1
1 SUPPOSITORY RECTAL
Qty: 93 | Refills: 0
Start: 2019-06-24 | End: 2019-07-23

## 2019-06-24 RX ORDER — RIVAROXABAN 15 MG-20MG
1 KIT ORAL
Qty: 0 | Refills: 0 | DISCHARGE

## 2019-06-24 RX ADMIN — Medication 25 MILLIGRAM(S): at 05:42

## 2019-06-24 NOTE — PROGRESS NOTE ADULT - PROBLEM SELECTOR PLAN 3
Pt's hemoglobin 8.8 on admission, down from baseline of ~12, most likely 2/2 hematuria in setting of recent instrumentation and xarelto use. Vital signs are stable. Pt on Xarelto for atrial fibrillation.   - On Xarelto, H/H is stable CHADSVASC=1 (HTN hx), currently in sinus bradycardia with possible active bleed.  - C/w Xarelto as above  - restart cardizem as tolerated

## 2019-06-24 NOTE — PROGRESS NOTE ADULT - PROBLEM SELECTOR PLAN 2
hyponatremia 2/2 TURBT and hypotonic fluid irrigation  - BMP Q8H  - Cortisol wnl  - pt passed TOV   - BMP in Q12h for now. Pt's hemoglobin 8.8 on admission, down from baseline of ~12, most likely 2/2 hematuria in setting of recent instrumentation and xarelto use. Vital signs are stable. Pt on Xarelto for atrial fibrillation.   - On Xarelto, H/H is stable

## 2019-06-24 NOTE — PROGRESS NOTE ADULT - PROBLEM SELECTOR PLAN 5
Recent dx with recent instrumentation; urology evaluated pt in ED, no urgent urologic intervention indicated. Normotensive on admission with downtrending H/H  -Will restart ARB and Diltiazem as tolerated

## 2019-06-24 NOTE — PROGRESS NOTE ADULT - PROBLEM SELECTOR PLAN 4
CHADSVASC=1 (HTN hx), currently in sinus bradycardia with possible active bleed.  - C/w Xarelto as above  - restart cardizem as tolerated Recent dx with recent instrumentation; urology evaluated pt in ED, no urgent urologic intervention indicated.

## 2019-06-24 NOTE — PROGRESS NOTE ADULT - SUBJECTIVE AND OBJECTIVE BOX
INCOMPLETE NOTE    Adi Lai MD EMMANUEL  Internal Medicine PGY-1  Pager [facility]: [phone]; [facility] [id number]    Patient is a 64y old  Male who presents with a chief complaint of Hyponatremia (2019 15:57)      SUBJECTIVE/OVERNIGHT EVENTS   No acute events overnight. Patient tolerating meals, without n/v. Reports having daily BM. Denies fevers, chills, SOB. ROS otherwise negative.     OBJECTIVE  Vital Signs Last 24 Hrs  T(C): 36.6 (2019 05:37), Max: 36.6 (2019 05:37)  T(F): 97.8 (2019 05:37), Max: 97.8 (2019 05:37)  HR: 61 (2019 05:37) (59 - 72)  BP: 114/73 (2019 05:37) (114/73 - 133/76)  BP(mean): --  RR: 18 (2019 05:37) (15 - 18)  SpO2: 99% (2019 05:37) (98% - 99%)    I&O's Summary    2019 07:01  -  2019 07:00  --------------------------------------------------------  IN: 800 mL / OUT: 4425 mL / NET: -3625 mL        PHYSICAL EXAM:  GENERAL: NAD, well-developed  HEAD:  Atraumatic, Normocephalic  EYES: EOMI, conjunctiva and sclera clear  NECK: Supple, No JVD  CHEST/LUNG: Clear to auscultation bilaterally; No wheeze  HEART: Regular rate and rhythm; No murmurs, rubs, or gallops  ABDOMEN: Soft, Nontender, Nondistended; Bowel sounds present  EXTREMITIES:  2+ Peripheral Pulses, No clubbing, cyanosis, or edema  PSYCH: AAOx3  NEUROLOGY: non-focal  SKIN: No rashes or lesions    LABS                        9.6    3.45  )-----------( 164      ( 2019 05:50 )             30.3                         9.8    3.83  )-----------( 166      ( 2019 07:36 )             30.0     06-24    138  |  103  |  20  ----------------------------<  118<H>  4.2   |  23  |  1.13      138  |  105  |  18  ----------------------------<  116<H>  4.0   |  22  |  1.22    Ca    8.7      2019 05:50  Ca    9.1      2019 21:13  Phos  3.1       Mg     2.0     -24      CAPILLARY BLOOD GLUCOSE           2019 13:20 Troponin x     /  u/L / CKMB 3.08 ng/mL / CKMB Units x                  Urinalysis Basic - ( 2019 08:55 )    Color: COLORLESS / Appearance: CLEAR / S.004 / pH: 6.5  Gluc: NEGATIVE / Ketone: NEGATIVE  / Bili: NEGATIVE / Urobili: NORMAL   Blood: LARGE / Protein: 50 / Nitrite: NEGATIVE   Leuk Esterase: MODERATE / RBC: 3-5 / WBC 11-25   Sq Epi: OCC / Non Sq Epi: x / Bacteria: NEGATIVE        RADIOLOGY & ADDITIONAL TESTS:    MEDICATIONS  (STANDING):  docusate sodium 100 milliGRAM(s) Oral daily  hydrOXYzine hydrochloride 25 milliGRAM(s) Oral two times a day  Jakafi (Ruxolitinib) 15 mg tablet 1 Tablet(s) 15 milliGRAM(s) Oral two times a day  melatonin 3 milliGRAM(s) Oral at bedtime  multivitamin 1 Tablet(s) Oral daily  rivaroxaban 20 milliGRAM(s) Oral with dinner  senna 2 Tablet(s) Oral at bedtime    MEDICATIONS  (PRN):  acetaminophen   Tablet .. 650 milliGRAM(s) Oral every 6 hours PRN Temp greater or equal to 38C (100.4F), Mild Pain (1 - 3), Moderate Pain (4 - 6)  aluminum hydroxide/magnesium hydroxide/simethicone Suspension 30 milliLiter(s) Oral every 6 hours PRN Dyspepsia  belladonna 16.2 mG/opium 30 mg Suppository 1 Suppository(s) Rectal every 8 hours PRN Bladder spasm  calcium carbonate    500 mG (Tums) Chewable 1 Tablet(s) Chew every 6 hours PRN Indigestion Adi Lai MD EMMANUEL  Internal Medicine PGY-1  Pager [facility]: [phone]; [facility] [id number]    Patient is a 64y old  Male who presents with a chief complaint of Hyponatremia (2019 15:57)    SUBJECTIVE/OVERNIGHT EVENTS   No acute events overnight. Passed TOV. Episode of unsustained Vtach yesterday, asymptomatic no chest pain or respiratory distress. ROS otherwise negative.     OBJECTIVE  Vital Signs Last 24 Hrs  T(C): 36.6 (2019 05:37), Max: 36.6 (2019 05:37)  T(F): 97.8 (2019 05:37), Max: 97.8 (2019 05:37)  HR: 61 (2019 05:37) (59 - 72)  BP: 114/73 (2019 05:37) (114/73 - 133/76)  BP(mean): --  RR: 18 (2019 05:37) (15 - 18)  SpO2: 99% (2019 05:37) (98% - 99%)    I&O's Summary    2019 07:01  -  2019 07:00  --------------------------------------------------------  IN: 800 mL / OUT: 4425 mL / NET: -3625 mL    PHYSICAL EXAM:  GENERAL: NAD, well-developed  HEAD:  Atraumatic, Normocephalic  EYES: EOMI, conjunctiva and sclera clear  NECK: Supple, No JVD  CHEST/LUNG: Clear to auscultation bilaterally; No wheeze  HEART: Regular rate and rhythm; No murmurs, rubs, or gallops  ABDOMEN: Soft, Nontender, Nondistended; Bowel sounds present  EXTREMITIES:  2+ Peripheral Pulses, No clubbing, cyanosis, or edema  PSYCH: AAOx3  NEUROLOGY: non-focal  SKIN: No rashes or lesions    LABS                        9.6    3.45  )-----------( 164      ( 2019 05:50 )             30.3                         9.8    3.83  )-----------( 166      ( 2019 07:36 )             30.0     -24    138  |  103  |  20  ----------------------------<  118<H>  4.2   |  23  |  1.13      138  |  105  |  18  ----------------------------<  116<H>  4.0   |  22  |  1.22    Ca    8.7      2019 05:50  Ca    9.1      2019 21:13  Phos  3.1       Mg     2.0           CAPILLARY BLOOD GLUCOSE   2019 13:20 Troponin x     /  u/L / CKMB 3.08 ng/mL / CKMB Units x        Urinalysis Basic - ( 2019 08:55 )    Color: COLORLESS / Appearance: CLEAR / S.004 / pH: 6.5  Gluc: NEGATIVE / Ketone: NEGATIVE  / Bili: NEGATIVE / Urobili: NORMAL   Blood: LARGE / Protein: 50 / Nitrite: NEGATIVE   Leuk Esterase: MODERATE / RBC: 3-5 / WBC 11-25   Sq Epi: OCC / Non Sq Epi: x / Bacteria: NEGATIVE    MEDICATIONS  (STANDING):  docusate sodium 100 milliGRAM(s) Oral daily  hydrOXYzine hydrochloride 25 milliGRAM(s) Oral two times a day  Jakafi (Ruxolitinib) 15 mg tablet 1 Tablet(s) 15 milliGRAM(s) Oral two times a day  melatonin 3 milliGRAM(s) Oral at bedtime  multivitamin 1 Tablet(s) Oral daily  rivaroxaban 20 milliGRAM(s) Oral with dinner  senna 2 Tablet(s) Oral at bedtime    MEDICATIONS  (PRN):  acetaminophen   Tablet .. 650 milliGRAM(s) Oral every 6 hours PRN Temp greater or equal to 38C (100.4F), Mild Pain (1 - 3), Moderate Pain (4 - 6)  aluminum hydroxide/magnesium hydroxide/simethicone Suspension 30 milliLiter(s) Oral every 6 hours PRN Dyspepsia  belladonna 16.2 mG/opium 30 mg Suppository 1 Suppository(s) Rectal every 8 hours PRN Bladder spasm  calcium carbonate    500 mG (Tums) Chewable 1 Tablet(s) Chew every 6 hours PRN Indigestion

## 2019-06-24 NOTE — PROGRESS NOTE ADULT - PROBLEM SELECTOR PLAN 7
On Jakafi  - nonformulary at hospital, pts family will bring in from home and then resume 15mg BID  - Discussed with outpt heme/onc Dr. Beard and he recommends to continue with Jakafi.  - Heme/onc documented approval for pharmacy, med to be verified by pharmacy DVT ppx: Xarelto   Diet: Regular  Dispo: home

## 2019-06-24 NOTE — PROGRESS NOTE ADULT - PROBLEM SELECTOR PLAN 6
Normotensive on admission with downtrending H/H  -Will restart ARB and Diltiazem as tolerated On Jakafi  - nonformulary at hospital, pts family will bring in from home and then resume 15mg BID  - Discussed with outpt heme/onc Dr. Beard and he recommends to continue with Jakafi.  - Heme/onc documented approval for pharmacy, med to be verified by pharmacy

## 2019-06-24 NOTE — DISCHARGE NOTE NURSING/CASE MANAGEMENT/SOCIAL WORK - NSDCDPATPORTLINK_GEN_ALL_CORE
You can access the [facility] Patient Portal, offered by [facility], by registering with the following website: [email or web address]

## 2019-06-24 NOTE — PROGRESS NOTE ADULT - PROBLEM SELECTOR PLAN 1
Due to hyponatremia, now at baseline mental status per wife and family. CT head negative for acute intracranial process, neuro exam wnl. Sodium 136, rapidly corrected from 114. Given acute hyponatremia risk for pontine demyelination is low  - Continue to trend BMP + Mg + Phos q6H  - Neurochecks q8 hyponatremia 2/2 TURBT and hypotonic fluid irrigation  - BMP Q8H  - Cortisol wnl  - pt passed TOV   - BMP in Q12h for now.

## 2019-06-24 NOTE — PROGRESS NOTE ADULT - ATTENDING COMMENTS
Patient with Afib on Xarelto, polycythemia vera on Jakafi, renal cell carcinoma s/p right partial nephrectomy 2012, recently dx bladder cancer s/p TURBT (6/20) and irrigation with sterile water for hematuria (6/21) presenting with metabolic encephalopathy 2/2 acute hyponatremia likely in setting of excessive absorption of hypotonic solutions used in TURBT, last Na 138, rapid overcorrection, continue IV D5W @ 150 ml/hour.  Monitor BMP q4-6h with goal ~130 over the next 24 hours. Patient evaluated by Urology, no intervention required at this time, will restart home xarelto tonight and monitor hematuria, H/H.
Pt seen and examined, chart and labs reviewed.  Agree with Dr. Archuleta's note as above. On exam today, patient complaining of L sided chest tightness described as indigestion/gas pains.  Pain is not reproducible, denies crushing pain or jaw claudication/numbness down arms.  Had episode of nausea yesterday, which resolved with Zofran.    Discussed with Dr. Ramirez PGY2 to obtain EKG and cardiac enzymes to r/o ischemic event (lower suspicion), will f/u.   Na today 136, rapid correction of acute hyponatremia of 114, but pt is asymptomatic. Agree with discontinuing D5W and monitoring BMP Q8 hrs
Sodium 138 stable, asymptomatic, MS at base line, urinating with light blood tinged urine, medically stable for discharge.  Spoke to pt at bedside and wife (on the phone) at length about f/u issues, they understood and agreed with the plan. pt will be followed by his cardiologist for his a-fib and a/c and Dr Love for his hematuria after discharge.   D/W Dr Love, urology, at bedside.   Total time: 35min

## 2019-06-26 ENCOUNTER — APPOINTMENT (OUTPATIENT)
Dept: INTERNAL MEDICINE | Facility: CLINIC | Age: 64
End: 2019-06-26
Payer: COMMERCIAL

## 2019-06-26 ENCOUNTER — LABORATORY RESULT (OUTPATIENT)
Age: 64
End: 2019-06-26

## 2019-06-26 DIAGNOSIS — M89.9 DISORDER OF BONE, UNSPECIFIED: ICD-10-CM

## 2019-06-26 DIAGNOSIS — M54.12 RADICULOPATHY, CERVICAL REGION: ICD-10-CM

## 2019-06-26 DIAGNOSIS — R94.39 ABNORMAL RESULT OF OTHER CARDIOVASCULAR FUNCTION STUDY: ICD-10-CM

## 2019-06-26 PROCEDURE — 36415 COLL VENOUS BLD VENIPUNCTURE: CPT

## 2019-06-28 ENCOUNTER — APPOINTMENT (OUTPATIENT)
Dept: INTERNAL MEDICINE | Facility: CLINIC | Age: 64
End: 2019-06-28
Payer: COMMERCIAL

## 2019-06-28 ENCOUNTER — RECORD ABSTRACTING (OUTPATIENT)
Age: 64
End: 2019-06-28

## 2019-06-28 VITALS — DIASTOLIC BLOOD PRESSURE: 58 MMHG | SYSTOLIC BLOOD PRESSURE: 98 MMHG

## 2019-06-28 VITALS
BODY MASS INDEX: 28.23 KG/M2 | DIASTOLIC BLOOD PRESSURE: 62 MMHG | HEIGHT: 73 IN | WEIGHT: 213 LBS | HEART RATE: 64 BPM | SYSTOLIC BLOOD PRESSURE: 110 MMHG

## 2019-06-28 DIAGNOSIS — R35.0 FREQUENCY OF MICTURITION: ICD-10-CM

## 2019-06-28 DIAGNOSIS — E87.1 HYPO-OSMOLALITY AND HYPONATREMIA: ICD-10-CM

## 2019-06-28 LAB
ALBUMIN SERPL ELPH-MCNC: 4.4 G/DL
ALP BLD-CCNC: 45 U/L
ALT SERPL-CCNC: 22 U/L
ANION GAP SERPL CALC-SCNC: 12 MMOL/L
AST SERPL-CCNC: 18 U/L
BASOPHILS # BLD AUTO: 0.02 K/UL
BASOPHILS NFR BLD AUTO: 0.4 %
BILIRUB SERPL-MCNC: 0.3 MG/DL
BUN SERPL-MCNC: 13 MG/DL
CALCIUM SERPL-MCNC: 9.6 MG/DL
CHLORIDE SERPL-SCNC: 102 MMOL/L
CO2 SERPL-SCNC: 25 MMOL/L
CREAT SERPL-MCNC: 1.18 MG/DL
EOSINOPHIL # BLD AUTO: 0.07 K/UL
EOSINOPHIL NFR BLD AUTO: 1.5 %
GLUCOSE SERPL-MCNC: 104 MG/DL
HCT VFR BLD CALC: 34.4 %
HGB BLD-MCNC: 10.4 G/DL
IMM GRANULOCYTES NFR BLD AUTO: 1.2 %
LYMPHOCYTES # BLD AUTO: 0.77 K/UL
LYMPHOCYTES NFR BLD AUTO: 16 %
MAGNESIUM SERPL-MCNC: 2 MG/DL
MAN DIFF?: NORMAL
MCHC RBC-ENTMCNC: 27.2 PG
MCHC RBC-ENTMCNC: 30.2 GM/DL
MCV RBC AUTO: 90.1 FL
MONOCYTES # BLD AUTO: 0.44 K/UL
MONOCYTES NFR BLD AUTO: 9.1 %
NEUTROPHILS # BLD AUTO: 3.45 K/UL
NEUTROPHILS NFR BLD AUTO: 71.8 %
PLATELET # BLD AUTO: 251 K/UL
POTASSIUM SERPL-SCNC: 4.8 MMOL/L
PROT SERPL-MCNC: 6.4 G/DL
RBC # BLD: 3.82 M/UL
RBC # FLD: 15.4 %
SODIUM SERPL-SCNC: 139 MMOL/L
WBC # FLD AUTO: 4.81 K/UL

## 2019-06-28 PROCEDURE — 99213 OFFICE O/P EST LOW 20 MIN: CPT

## 2019-06-28 NOTE — PHYSICAL EXAM
[No Acute Distress] : no acute distress [No JVD] : no jugular venous distention [No Respiratory Distress] : no respiratory distress  [Clear to Auscultation] : lungs were clear to auscultation bilaterally [No Accessory Muscle Use] : no accessory muscle use [Normal Rate] : normal rate  [Regular Rhythm] : with a regular rhythm [Normal S1, S2] : normal S1 and S2 [No Murmur] : no murmur heard [No Carotid Bruits] : no carotid bruits

## 2019-06-28 NOTE — ASSESSMENT
[FreeTextEntry1] : Hyponatremia has resolved, and mental status is normal today.\malik Had wide complex tachycardia in hospital perhaps related to snoring (according to hois wife who observed this) and perhaps related to stopping the diltiazem in -hospital. \malik BP today is very low and although he is not dizzy now, he reports that he is dizzty sometimes while walkiing.

## 2019-06-28 NOTE — HISTORY OF PRESENT ILLNESS
[FreeTextEntry1] : follow up [de-identified] : 1 week ago was taken to ER with mental status changes after TURBT. Was found to be hyponatremic to 114 and was treated with IV NS with gradual response and improvement in mental status. He had a wide complex tachycardia although strips are not available.

## 2019-06-28 NOTE — PLAN
[FreeTextEntry1] : Stop irbesartan and measure BP once daily 2 hours after the  diltiazem administration.  Guidelines for restarting--SBP > 150 mm Hg. \par Holter monitor \par

## 2019-07-05 LAB — VASOPRESSIN SERPL-MCNC: <0.8 PG/ML

## 2019-07-09 RX ORDER — SULFAMETHOXAZOLE AND TRIMETHOPRIM 800; 160 MG/1; MG/1
800-160 TABLET ORAL TWICE DAILY
Qty: 6 | Refills: 0 | Status: COMPLETED | COMMUNITY
Start: 2019-07-09 | End: 2019-07-09

## 2019-07-16 ENCOUNTER — APPOINTMENT (OUTPATIENT)
Dept: UROLOGY | Facility: CLINIC | Age: 64
End: 2019-07-16
Payer: COMMERCIAL

## 2019-07-16 ENCOUNTER — OUTPATIENT (OUTPATIENT)
Dept: OUTPATIENT SERVICES | Facility: HOSPITAL | Age: 64
LOS: 1 days | End: 2019-07-16
Payer: COMMERCIAL

## 2019-07-16 VITALS
HEART RATE: 52 BPM | DIASTOLIC BLOOD PRESSURE: 73 MMHG | SYSTOLIC BLOOD PRESSURE: 124 MMHG | RESPIRATION RATE: 18 BRPM | TEMPERATURE: 97.5 F

## 2019-07-16 VITALS — DIASTOLIC BLOOD PRESSURE: 75 MMHG | SYSTOLIC BLOOD PRESSURE: 120 MMHG | RESPIRATION RATE: 16 BRPM | HEART RATE: 54 BPM

## 2019-07-16 DIAGNOSIS — Z98.89 OTHER SPECIFIED POSTPROCEDURAL STATES: Chronic | ICD-10-CM

## 2019-07-16 DIAGNOSIS — R35.0 FREQUENCY OF MICTURITION: ICD-10-CM

## 2019-07-16 DIAGNOSIS — M95.9 ACQUIRED DEFORMITY OF MUSCULOSKELETAL SYSTEM, UNSPECIFIED: Chronic | ICD-10-CM

## 2019-07-16 PROCEDURE — 51720 TREATMENT OF BLADDER LESION: CPT

## 2019-07-16 RX ORDER — MITOMYCIN 40 MG/80ML
40 INJECTION, POWDER, LYOPHILIZED, FOR SOLUTION INTRAVENOUS
Qty: 1 | Refills: 0 | Status: COMPLETED | OUTPATIENT
Start: 2019-07-16 | End: 2019-07-16

## 2019-07-16 RX ORDER — MITOMYCIN 40 MG/80ML
40 INJECTION, POWDER, LYOPHILIZED, FOR SOLUTION INTRAVENOUS
Qty: 1 | Refills: 0 | Status: COMPLETED | OUTPATIENT
Start: 2019-07-16

## 2019-07-16 RX ORDER — HYOSCYAMINE SULFATE 0.12 MG/1
0.12 TABLET, ORALLY DISINTEGRATING ORAL
Qty: 1 | Refills: 0 | Status: COMPLETED | OUTPATIENT
Start: 2019-07-16 | End: 2019-07-16

## 2019-07-16 RX ORDER — MITOMYCIN 5 MG/10ML
40 INJECTION, POWDER, LYOPHILIZED, FOR SOLUTION INTRAVENOUS ONCE
Refills: 0 | Status: DISCONTINUED | OUTPATIENT
Start: 2019-07-16 | End: 2019-07-31

## 2019-07-17 LAB
APPEARANCE: CLEAR
BACTERIA UR CULT: NORMAL
BACTERIA: NEGATIVE
BILIRUBIN URINE: NEGATIVE
BLOOD URINE: NORMAL
COLOR: NORMAL
GLUCOSE QUALITATIVE U: NEGATIVE
HYALINE CASTS: 0 /LPF
KETONES URINE: NEGATIVE
LEUKOCYTE ESTERASE URINE: ABNORMAL
MICROSCOPIC-UA: NORMAL
NITRITE URINE: NEGATIVE
PH URINE: 7
PROTEIN URINE: NEGATIVE
RED BLOOD CELLS URINE: 11 /HPF
SPECIFIC GRAVITY URINE: 1.01
SQUAMOUS EPITHELIAL CELLS: 2 /HPF
UROBILINOGEN URINE: NORMAL
WHITE BLOOD CELLS URINE: 24 /HPF

## 2019-07-23 ENCOUNTER — APPOINTMENT (OUTPATIENT)
Dept: UROLOGY | Facility: CLINIC | Age: 64
End: 2019-07-23
Payer: COMMERCIAL

## 2019-07-23 ENCOUNTER — OUTPATIENT (OUTPATIENT)
Dept: OUTPATIENT SERVICES | Facility: HOSPITAL | Age: 64
LOS: 1 days | End: 2019-07-23
Payer: COMMERCIAL

## 2019-07-23 VITALS — TEMPERATURE: 97.6 F | SYSTOLIC BLOOD PRESSURE: 127 MMHG | HEART RATE: 58 BPM | DIASTOLIC BLOOD PRESSURE: 67 MMHG

## 2019-07-23 DIAGNOSIS — Z98.89 OTHER SPECIFIED POSTPROCEDURAL STATES: Chronic | ICD-10-CM

## 2019-07-23 DIAGNOSIS — R35.0 FREQUENCY OF MICTURITION: ICD-10-CM

## 2019-07-23 DIAGNOSIS — M95.9 ACQUIRED DEFORMITY OF MUSCULOSKELETAL SYSTEM, UNSPECIFIED: Chronic | ICD-10-CM

## 2019-07-23 PROCEDURE — 51720 TREATMENT OF BLADDER LESION: CPT

## 2019-07-23 RX ORDER — MITOMYCIN 5 MG/10ML
40 INJECTION, POWDER, LYOPHILIZED, FOR SOLUTION INTRAVENOUS ONCE
Refills: 0 | Status: DISCONTINUED | OUTPATIENT
Start: 2019-07-23 | End: 2019-08-09

## 2019-07-23 RX ORDER — MITOMYCIN 40 MG/80ML
40 INJECTION, POWDER, LYOPHILIZED, FOR SOLUTION INTRAVENOUS
Qty: 1 | Refills: 0 | Status: COMPLETED | OUTPATIENT
Start: 2019-07-23 | End: 2019-07-23

## 2019-07-23 RX ORDER — HYOSCYAMINE SULFATE 0.12 MG/1
0.12 TABLET, ORALLY DISINTEGRATING ORAL
Qty: 1 | Refills: 0 | Status: COMPLETED | OUTPATIENT
Start: 2019-07-23 | End: 2019-07-23

## 2019-07-23 RX ORDER — MITOMYCIN 40 MG/80ML
40 INJECTION, POWDER, LYOPHILIZED, FOR SOLUTION INTRAVENOUS
Qty: 1 | Refills: 0 | Status: COMPLETED | OUTPATIENT
Start: 2019-07-23

## 2019-07-23 RX ORDER — HYOSCYAMINE SULFATE 0.12 MG/1
0.12 TABLET, ORALLY DISINTEGRATING ORAL
Refills: 0 | Status: COMPLETED | OUTPATIENT
Start: 2019-07-23

## 2019-07-23 RX ADMIN — MITOMYCIN 0 MG: 40 INJECTION, POWDER, LYOPHILIZED, FOR SOLUTION INTRAVENOUS at 00:00

## 2019-07-23 RX ADMIN — HYOSCYAMINE SULFATE 0 MG: 0.12 TABLET ORAL; SUBLINGUAL at 00:00

## 2019-07-28 ENCOUNTER — OTHER (OUTPATIENT)
Age: 64
End: 2019-07-28

## 2019-07-28 RX ORDER — ONDANSETRON 4 MG/1
4 TABLET, ORALLY DISINTEGRATING ORAL EVERY 8 HOURS
Qty: 4 | Refills: 0 | Status: COMPLETED | COMMUNITY
Start: 2019-06-21 | End: 2019-07-28

## 2019-07-30 ENCOUNTER — OUTPATIENT (OUTPATIENT)
Dept: OUTPATIENT SERVICES | Facility: HOSPITAL | Age: 64
LOS: 1 days | End: 2019-07-30
Payer: COMMERCIAL

## 2019-07-30 ENCOUNTER — APPOINTMENT (OUTPATIENT)
Dept: UROLOGY | Facility: CLINIC | Age: 64
End: 2019-07-30
Payer: COMMERCIAL

## 2019-07-30 VITALS — DIASTOLIC BLOOD PRESSURE: 75 MMHG | HEART RATE: 47 BPM | RESPIRATION RATE: 15 BRPM | SYSTOLIC BLOOD PRESSURE: 130 MMHG

## 2019-07-30 VITALS
SYSTOLIC BLOOD PRESSURE: 155 MMHG | DIASTOLIC BLOOD PRESSURE: 79 MMHG | RESPIRATION RATE: 16 BRPM | TEMPERATURE: 98 F | HEART RATE: 53 BPM

## 2019-07-30 DIAGNOSIS — M95.9 ACQUIRED DEFORMITY OF MUSCULOSKELETAL SYSTEM, UNSPECIFIED: Chronic | ICD-10-CM

## 2019-07-30 DIAGNOSIS — Z98.89 OTHER SPECIFIED POSTPROCEDURAL STATES: Chronic | ICD-10-CM

## 2019-07-30 DIAGNOSIS — N32.81 OVERACTIVE BLADDER: ICD-10-CM

## 2019-07-30 DIAGNOSIS — R35.0 FREQUENCY OF MICTURITION: ICD-10-CM

## 2019-07-30 DIAGNOSIS — C67.2 MALIGNANT NEOPLASM OF LATERAL WALL OF BLADDER: ICD-10-CM

## 2019-07-30 PROCEDURE — 51720 TREATMENT OF BLADDER LESION: CPT

## 2019-07-30 RX ORDER — HYOSCYAMINE SULFATE 0.12 MG/1
0.12 TABLET ORAL
Refills: 0 | Status: COMPLETED | OUTPATIENT
Start: 2019-07-30

## 2019-07-30 RX ORDER — MITOMYCIN 5 MG/10ML
40 INJECTION, POWDER, LYOPHILIZED, FOR SOLUTION INTRAVENOUS ONCE
Refills: 0 | Status: DISCONTINUED | OUTPATIENT
Start: 2019-07-30 | End: 2019-08-15

## 2019-07-30 RX ORDER — MITOMYCIN 40 MG/80ML
40 INJECTION, POWDER, LYOPHILIZED, FOR SOLUTION INTRAVENOUS
Qty: 1 | Refills: 0 | Status: COMPLETED | OUTPATIENT
Start: 2019-07-30

## 2019-07-30 RX ADMIN — MITOMYCIN 0 MG: 40 INJECTION, POWDER, LYOPHILIZED, FOR SOLUTION INTRAVENOUS at 00:00

## 2019-07-30 RX ADMIN — Medication 1 MG: at 00:00

## 2019-08-02 DIAGNOSIS — C67.2 MALIGNANT NEOPLASM OF LATERAL WALL OF BLADDER: ICD-10-CM

## 2019-08-06 ENCOUNTER — APPOINTMENT (OUTPATIENT)
Dept: UROLOGY | Facility: CLINIC | Age: 64
End: 2019-08-06
Payer: COMMERCIAL

## 2019-08-06 ENCOUNTER — OUTPATIENT (OUTPATIENT)
Dept: OUTPATIENT SERVICES | Facility: HOSPITAL | Age: 64
LOS: 1 days | End: 2019-08-06
Payer: COMMERCIAL

## 2019-08-06 VITALS — SYSTOLIC BLOOD PRESSURE: 108 MMHG | DIASTOLIC BLOOD PRESSURE: 55 MMHG | HEART RATE: 60 BPM | TEMPERATURE: 97.9 F

## 2019-08-06 VITALS — SYSTOLIC BLOOD PRESSURE: 116 MMHG | DIASTOLIC BLOOD PRESSURE: 75 MMHG | HEART RATE: 59 BPM | RESPIRATION RATE: 18 BRPM

## 2019-08-06 DIAGNOSIS — Z98.89 OTHER SPECIFIED POSTPROCEDURAL STATES: Chronic | ICD-10-CM

## 2019-08-06 DIAGNOSIS — R35.0 FREQUENCY OF MICTURITION: ICD-10-CM

## 2019-08-06 DIAGNOSIS — M95.9 ACQUIRED DEFORMITY OF MUSCULOSKELETAL SYSTEM, UNSPECIFIED: Chronic | ICD-10-CM

## 2019-08-06 PROCEDURE — 51720 TREATMENT OF BLADDER LESION: CPT

## 2019-08-06 RX ORDER — MITOMYCIN 40 MG/80ML
40 INJECTION, POWDER, LYOPHILIZED, FOR SOLUTION INTRAVENOUS
Qty: 1 | Refills: 0 | Status: COMPLETED | OUTPATIENT
Start: 2019-08-06

## 2019-08-06 RX ORDER — MITOMYCIN 40 MG/80ML
40 INJECTION, POWDER, LYOPHILIZED, FOR SOLUTION INTRAVENOUS
Qty: 1 | Refills: 0 | Status: COMPLETED | OUTPATIENT
Start: 2019-07-30 | End: 2019-08-06

## 2019-08-06 RX ORDER — HYOSCYAMINE SULFATE 0.12 MG/1
0.12 TABLET, ORALLY DISINTEGRATING ORAL
Qty: 1 | Refills: 0 | Status: COMPLETED | OUTPATIENT
Start: 2019-07-30 | End: 2019-08-06

## 2019-08-06 RX ORDER — HYOSCYAMINE SULFATE 0.12 MG/1
0.12 TABLET ORAL
Refills: 0 | Status: COMPLETED | OUTPATIENT
Start: 2019-08-06

## 2019-08-06 RX ORDER — MITOMYCIN 5 MG/10ML
40 INJECTION, POWDER, LYOPHILIZED, FOR SOLUTION INTRAVENOUS ONCE
Refills: 0 | Status: DISCONTINUED | OUTPATIENT
Start: 2019-08-06 | End: 2019-08-29

## 2019-08-06 RX ADMIN — Medication 0 MG: at 00:00

## 2019-08-06 RX ADMIN — MITOMYCIN 0 MG: 40 INJECTION, POWDER, LYOPHILIZED, FOR SOLUTION INTRAVENOUS at 00:00

## 2019-08-09 ENCOUNTER — MEDICATION RENEWAL (OUTPATIENT)
Age: 64
End: 2019-08-09

## 2019-08-13 ENCOUNTER — APPOINTMENT (OUTPATIENT)
Dept: UROLOGY | Facility: CLINIC | Age: 64
End: 2019-08-13
Payer: COMMERCIAL

## 2019-08-13 ENCOUNTER — OUTPATIENT (OUTPATIENT)
Dept: OUTPATIENT SERVICES | Facility: HOSPITAL | Age: 64
LOS: 1 days | End: 2019-08-13
Payer: COMMERCIAL

## 2019-08-13 VITALS
DIASTOLIC BLOOD PRESSURE: 78 MMHG | HEART RATE: 89 BPM | RESPIRATION RATE: 16 BRPM | TEMPERATURE: 98.2 F | SYSTOLIC BLOOD PRESSURE: 130 MMHG

## 2019-08-13 DIAGNOSIS — Z98.89 OTHER SPECIFIED POSTPROCEDURAL STATES: Chronic | ICD-10-CM

## 2019-08-13 DIAGNOSIS — R35.0 FREQUENCY OF MICTURITION: ICD-10-CM

## 2019-08-13 DIAGNOSIS — M95.9 ACQUIRED DEFORMITY OF MUSCULOSKELETAL SYSTEM, UNSPECIFIED: Chronic | ICD-10-CM

## 2019-08-13 DIAGNOSIS — C67.9 MALIGNANT NEOPLASM OF BLADDER, UNSPECIFIED: ICD-10-CM

## 2019-08-13 DIAGNOSIS — C67.2 MALIGNANT NEOPLASM OF LATERAL WALL OF BLADDER: ICD-10-CM

## 2019-08-13 PROCEDURE — 51720 TREATMENT OF BLADDER LESION: CPT

## 2019-08-13 RX ORDER — HYOSCYAMINE SULFATE 0.12 MG/1
0.12 TABLET, ORALLY DISINTEGRATING ORAL
Refills: 0 | Status: COMPLETED | OUTPATIENT
Start: 2019-08-13

## 2019-08-13 RX ORDER — MITOMYCIN 40 MG/80ML
40 INJECTION, POWDER, LYOPHILIZED, FOR SOLUTION INTRAVENOUS
Qty: 1 | Refills: 0 | Status: COMPLETED | OUTPATIENT
Start: 2019-08-13

## 2019-08-13 RX ORDER — MITOMYCIN 40 MG/80ML
40 INJECTION, POWDER, LYOPHILIZED, FOR SOLUTION INTRAVENOUS
Qty: 1 | Refills: 0 | Status: COMPLETED | OUTPATIENT
Start: 2019-08-13 | End: 2019-08-13

## 2019-08-13 RX ORDER — MITOMYCIN 5 MG/10ML
40 INJECTION, POWDER, LYOPHILIZED, FOR SOLUTION INTRAVENOUS ONCE
Refills: 0 | Status: DISCONTINUED | OUTPATIENT
Start: 2019-08-13 | End: 2019-08-29

## 2019-08-13 RX ORDER — HYOSCYAMINE SULFATE 0.12 MG/1
0.12 TABLET, ORALLY DISINTEGRATING ORAL
Qty: 1 | Refills: 0 | Status: COMPLETED | OUTPATIENT
Start: 2019-08-13 | End: 2019-08-13

## 2019-08-13 RX ADMIN — MITOMYCIN 0 MG: 40 INJECTION, POWDER, LYOPHILIZED, FOR SOLUTION INTRAVENOUS at 00:00

## 2019-08-13 RX ADMIN — HYOSCYAMINE SULFATE 0 MG: 0.12 TABLET ORAL; SUBLINGUAL at 00:00

## 2019-08-20 ENCOUNTER — APPOINTMENT (OUTPATIENT)
Dept: UROLOGY | Facility: CLINIC | Age: 64
End: 2019-08-20
Payer: COMMERCIAL

## 2019-08-20 ENCOUNTER — OUTPATIENT (OUTPATIENT)
Dept: OUTPATIENT SERVICES | Facility: HOSPITAL | Age: 64
LOS: 1 days | End: 2019-08-20
Payer: COMMERCIAL

## 2019-08-20 VITALS
WEIGHT: 213 LBS | TEMPERATURE: 97.7 F | HEIGHT: 73 IN | BODY MASS INDEX: 28.23 KG/M2 | HEART RATE: 64 BPM | SYSTOLIC BLOOD PRESSURE: 129 MMHG | DIASTOLIC BLOOD PRESSURE: 72 MMHG

## 2019-08-20 VITALS — SYSTOLIC BLOOD PRESSURE: 133 MMHG | DIASTOLIC BLOOD PRESSURE: 74 MMHG | HEART RATE: 64 BPM

## 2019-08-20 DIAGNOSIS — Z98.89 OTHER SPECIFIED POSTPROCEDURAL STATES: Chronic | ICD-10-CM

## 2019-08-20 DIAGNOSIS — M95.9 ACQUIRED DEFORMITY OF MUSCULOSKELETAL SYSTEM, UNSPECIFIED: Chronic | ICD-10-CM

## 2019-08-20 DIAGNOSIS — R35.0 FREQUENCY OF MICTURITION: ICD-10-CM

## 2019-08-20 PROCEDURE — 51720 TREATMENT OF BLADDER LESION: CPT

## 2019-08-20 RX ORDER — MITOMYCIN 40 MG/80ML
40 INJECTION, POWDER, LYOPHILIZED, FOR SOLUTION INTRAVENOUS
Qty: 1 | Refills: 0 | Status: COMPLETED | OUTPATIENT
Start: 2019-08-20

## 2019-08-20 RX ORDER — HYOSCYAMINE SULFATE 0.12 MG/1
0.12 TABLET, ORALLY DISINTEGRATING ORAL
Refills: 0 | Status: COMPLETED | OUTPATIENT
Start: 2019-08-20

## 2019-08-20 RX ORDER — MITOMYCIN 40 MG/80ML
40 INJECTION, POWDER, LYOPHILIZED, FOR SOLUTION INTRAVENOUS
Qty: 1 | Refills: 0 | Status: COMPLETED | OUTPATIENT
Start: 2019-08-20 | End: 2019-08-20

## 2019-08-20 RX ORDER — MITOMYCIN 5 MG/10ML
40 INJECTION, POWDER, LYOPHILIZED, FOR SOLUTION INTRAVENOUS ONCE
Refills: 0 | Status: DISCONTINUED | OUTPATIENT
Start: 2019-08-20 | End: 2019-09-11

## 2019-08-20 RX ORDER — HYOSCYAMINE SULFATE 0.12 MG/1
0.12 TABLET, ORALLY DISINTEGRATING ORAL
Qty: 1 | Refills: 0 | Status: COMPLETED | OUTPATIENT
Start: 2019-08-20 | End: 2019-08-20

## 2019-08-20 RX ADMIN — MITOMYCIN 0 MG: 40 INJECTION, POWDER, LYOPHILIZED, FOR SOLUTION INTRAVENOUS at 00:00

## 2019-08-20 RX ADMIN — HYOSCYAMINE SULFATE 0 MG: 0.12 TABLET ORAL; SUBLINGUAL at 00:00

## 2019-08-20 NOTE — ASU PREOP CHECKLIST - WAS PATIENT ON BETA BLOCKER?
"Informant-    Triny is accompanied by mother    Reason for Visit-  Abdominal pain     Vitals signs-  Ht 1.141 m (3' 8.92\")   Wt 23.9 kg (52 lb 11 oz)   BMI 18.36 kg/m      There are concerns about the child's exposure to violence in the home: No    Face to Face time: 5 minutes  Kassandra Kramer MA      "
No

## 2019-08-22 DIAGNOSIS — C67.2 MALIGNANT NEOPLASM OF LATERAL WALL OF BLADDER: ICD-10-CM

## 2019-08-23 DIAGNOSIS — C67.9 MALIGNANT NEOPLASM OF BLADDER, UNSPECIFIED: ICD-10-CM

## 2019-10-03 ENCOUNTER — MEDICATION RENEWAL (OUTPATIENT)
Age: 64
End: 2019-10-03

## 2019-10-03 ENCOUNTER — APPOINTMENT (OUTPATIENT)
Dept: UROLOGY | Facility: CLINIC | Age: 64
End: 2019-10-03
Payer: COMMERCIAL

## 2019-10-03 ENCOUNTER — OUTPATIENT (OUTPATIENT)
Dept: OUTPATIENT SERVICES | Facility: HOSPITAL | Age: 64
LOS: 1 days | End: 2019-10-03
Payer: COMMERCIAL

## 2019-10-03 VITALS
DIASTOLIC BLOOD PRESSURE: 71 MMHG | HEART RATE: 53 BPM | WEIGHT: 213 LBS | TEMPERATURE: 97.5 F | HEIGHT: 73 IN | SYSTOLIC BLOOD PRESSURE: 136 MMHG | RESPIRATION RATE: 16 BRPM | BODY MASS INDEX: 28.23 KG/M2

## 2019-10-03 DIAGNOSIS — M95.9 ACQUIRED DEFORMITY OF MUSCULOSKELETAL SYSTEM, UNSPECIFIED: Chronic | ICD-10-CM

## 2019-10-03 DIAGNOSIS — Z98.89 OTHER SPECIFIED POSTPROCEDURAL STATES: Chronic | ICD-10-CM

## 2019-10-03 DIAGNOSIS — R35.0 FREQUENCY OF MICTURITION: ICD-10-CM

## 2019-10-03 PROCEDURE — 52000 CYSTOURETHROSCOPY: CPT

## 2019-10-04 ENCOUNTER — APPOINTMENT (OUTPATIENT)
Dept: INTERNAL MEDICINE | Facility: CLINIC | Age: 64
End: 2019-10-04
Payer: COMMERCIAL

## 2019-10-04 ENCOUNTER — MEDICATION RENEWAL (OUTPATIENT)
Age: 64
End: 2019-10-04

## 2019-10-04 VITALS
HEIGHT: 73 IN | WEIGHT: 213 LBS | BODY MASS INDEX: 28.23 KG/M2 | DIASTOLIC BLOOD PRESSURE: 80 MMHG | HEART RATE: 60 BPM | SYSTOLIC BLOOD PRESSURE: 122 MMHG

## 2019-10-04 LAB — URINE CYTOLOGY: NORMAL

## 2019-10-04 PROCEDURE — 99213 OFFICE O/P EST LOW 20 MIN: CPT | Mod: 25

## 2019-10-04 PROCEDURE — G0008: CPT

## 2019-10-04 PROCEDURE — 90686 IIV4 VACC NO PRSV 0.5 ML IM: CPT

## 2019-10-04 RX ORDER — TAMSULOSIN HYDROCHLORIDE 0.4 MG/1
0.4 CAPSULE ORAL
Qty: 7 | Refills: 0 | Status: DISCONTINUED | COMMUNITY
Start: 2019-06-21 | End: 2019-10-04

## 2019-10-04 RX ORDER — AMOXICILLIN AND CLAVULANATE POTASSIUM 875; 125 MG/1; MG/1
875-125 TABLET, COATED ORAL
Qty: 14 | Refills: 0 | Status: COMPLETED | COMMUNITY
Start: 2019-10-04 | End: 2019-10-11

## 2019-10-04 NOTE — PHYSICAL EXAM
[No Respiratory Distress] : no respiratory distress  [No Acute Distress] : no acute distress [Clear to Auscultation] : lungs were clear to auscultation bilaterally [No Accessory Muscle Use] : no accessory muscle use [Normal Rate] : normal rate  [Regular Rhythm] : with a regular rhythm [No Murmur] : no murmur heard [Normal S1, S2] : normal S1 and S2 [de-identified] : No open laceration but has a hematoma on shin with superficial wounds transverse to the shin, with erythema.

## 2019-10-04 NOTE — HISTORY OF PRESENT ILLNESS
[de-identified] : Banged his leg on a brick wall late at night. Was seen by podiatrist. Has had a little bleeding but no ooze. \par Has not been taking furosemide, occasional swelling in feet. \par No tachycardia that he's aware of. No dizziness, no SOB. REmains on diltiazem.

## 2019-10-04 NOTE — ASSESSMENT
[FreeTextEntry1] : Hematoma and impact injury to shin on the left, with ertytema abround the wound areae

## 2019-10-16 DIAGNOSIS — C67.2 MALIGNANT NEOPLASM OF LATERAL WALL OF BLADDER: ICD-10-CM

## 2019-10-18 ENCOUNTER — TRANSCRIPTION ENCOUNTER (OUTPATIENT)
Age: 64
End: 2019-10-18

## 2019-10-22 ENCOUNTER — APPOINTMENT (OUTPATIENT)
Dept: UROLOGY | Facility: CLINIC | Age: 64
End: 2019-10-22

## 2019-11-01 ENCOUNTER — RX RENEWAL (OUTPATIENT)
Age: 64
End: 2019-11-01

## 2019-11-03 ENCOUNTER — RX RENEWAL (OUTPATIENT)
Age: 64
End: 2019-11-03

## 2019-11-13 ENCOUNTER — CHART COPY (OUTPATIENT)
Age: 64
End: 2019-11-13

## 2019-12-06 ENCOUNTER — APPOINTMENT (OUTPATIENT)
Dept: INTERNAL MEDICINE | Facility: CLINIC | Age: 64
End: 2019-12-06
Payer: COMMERCIAL

## 2019-12-06 VITALS
WEIGHT: 213 LBS | HEIGHT: 73 IN | DIASTOLIC BLOOD PRESSURE: 80 MMHG | TEMPERATURE: 97.8 F | SYSTOLIC BLOOD PRESSURE: 110 MMHG | BODY MASS INDEX: 28.23 KG/M2

## 2019-12-06 PROCEDURE — 99214 OFFICE O/P EST MOD 30 MIN: CPT | Mod: 25

## 2019-12-10 ENCOUNTER — OUTPATIENT (OUTPATIENT)
Dept: OUTPATIENT SERVICES | Facility: HOSPITAL | Age: 64
LOS: 1 days | End: 2019-12-10
Payer: COMMERCIAL

## 2019-12-10 VITALS
RESPIRATION RATE: 14 BRPM | TEMPERATURE: 98 F | SYSTOLIC BLOOD PRESSURE: 130 MMHG | WEIGHT: 218.04 LBS | OXYGEN SATURATION: 98 % | HEART RATE: 55 BPM | DIASTOLIC BLOOD PRESSURE: 70 MMHG | HEIGHT: 71.5 IN

## 2019-12-10 DIAGNOSIS — N32.81 OVERACTIVE BLADDER: ICD-10-CM

## 2019-12-10 DIAGNOSIS — Z98.89 OTHER SPECIFIED POSTPROCEDURAL STATES: Chronic | ICD-10-CM

## 2019-12-10 DIAGNOSIS — M95.9 ACQUIRED DEFORMITY OF MUSCULOSKELETAL SYSTEM, UNSPECIFIED: Chronic | ICD-10-CM

## 2019-12-10 LAB
ALBUMIN SERPL ELPH-MCNC: 4.6 G/DL — SIGNIFICANT CHANGE UP (ref 3.3–5)
ALP SERPL-CCNC: 61 U/L — SIGNIFICANT CHANGE UP (ref 40–120)
ALT FLD-CCNC: 27 U/L — SIGNIFICANT CHANGE UP (ref 4–41)
ANION GAP SERPL CALC-SCNC: 13 MMO/L — SIGNIFICANT CHANGE UP (ref 7–14)
AST SERPL-CCNC: 32 U/L — SIGNIFICANT CHANGE UP (ref 4–40)
BILIRUB SERPL-MCNC: 0.4 MG/DL — SIGNIFICANT CHANGE UP (ref 0.2–1.2)
BUN SERPL-MCNC: 22 MG/DL — SIGNIFICANT CHANGE UP (ref 7–23)
CALCIUM SERPL-MCNC: 10.1 MG/DL — SIGNIFICANT CHANGE UP (ref 8.4–10.5)
CHLORIDE SERPL-SCNC: 102 MMOL/L — SIGNIFICANT CHANGE UP (ref 98–107)
CO2 SERPL-SCNC: 25 MMOL/L — SIGNIFICANT CHANGE UP (ref 22–31)
CREAT SERPL-MCNC: 1.06 MG/DL — SIGNIFICANT CHANGE UP (ref 0.5–1.3)
GLUCOSE SERPL-MCNC: 76 MG/DL — SIGNIFICANT CHANGE UP (ref 70–99)
HCT VFR BLD CALC: 40.5 % — SIGNIFICANT CHANGE UP (ref 39–50)
HGB BLD-MCNC: 12.1 G/DL — LOW (ref 13–17)
MCHC RBC-ENTMCNC: 26.4 PG — LOW (ref 27–34)
MCHC RBC-ENTMCNC: 29.9 % — LOW (ref 32–36)
MCV RBC AUTO: 88.2 FL — SIGNIFICANT CHANGE UP (ref 80–100)
NRBC # FLD: 0 K/UL — SIGNIFICANT CHANGE UP (ref 0–0)
PLATELET # BLD AUTO: 309 K/UL — SIGNIFICANT CHANGE UP (ref 150–400)
PMV BLD: 11.6 FL — SIGNIFICANT CHANGE UP (ref 7–13)
POTASSIUM SERPL-MCNC: 4.8 MMOL/L — SIGNIFICANT CHANGE UP (ref 3.5–5.3)
POTASSIUM SERPL-SCNC: 4.8 MMOL/L — SIGNIFICANT CHANGE UP (ref 3.5–5.3)
PROT SERPL-MCNC: 7.2 G/DL — SIGNIFICANT CHANGE UP (ref 6–8.3)
RBC # BLD: 4.59 M/UL — SIGNIFICANT CHANGE UP (ref 4.2–5.8)
RBC # FLD: 17.2 % — HIGH (ref 10.3–14.5)
SODIUM SERPL-SCNC: 140 MMOL/L — SIGNIFICANT CHANGE UP (ref 135–145)
WBC # BLD: 3.75 K/UL — LOW (ref 3.8–10.5)
WBC # FLD AUTO: 3.75 K/UL — LOW (ref 3.8–10.5)

## 2019-12-10 PROCEDURE — 93010 ELECTROCARDIOGRAM REPORT: CPT

## 2019-12-10 RX ORDER — IRBESARTAN 75 MG/1
1 TABLET ORAL
Qty: 0 | Refills: 0 | DISCHARGE

## 2019-12-10 NOTE — H&P PST ADULT - VENOUS THROMBOEMBOLISM CURRENT STATUS
(2) malignancy (present or previous) (3) other thrombophilia, congenital or acquired/(2) malignancy (present or previous) (2) malignancy (present or previous)/(1) other risk factor (includes escalating BMI, pack-years of smoking, diabetes requiring insulin, chemotherapy, female gender and length of surgery)/(3) other thrombophilia, congenital or acquired

## 2019-12-10 NOTE — H&P PST ADULT - ACTIVITY
gym 2-3/week wgts - 30 mins - walks on treadmill "sometimes" 1 hr session gym 2-3/week weight lifting- 30 mins - walks on treadmill "sometimes" 1 hr session

## 2019-12-10 NOTE — H&P PST ADULT - NSICDXPASTMEDICALHX_GEN_ALL_CORE_FT
PAST MEDICAL HISTORY:  Atrial fibrillation on Xarelto    Basal cell carcinoma nose    BPH (benign prostatic hypertrophy) denies    Bulging cervical disc    Cataract of right eye     Degenerated intervertebral disc lumbar    H/O polycythemia vera     HTN (hypertension)     Hyperlipidemia     Melanoma in situ of skin of trunk     Muscle atrophy bilateral feet    Peripheral neuropathy "both feet"    Psoriasis (a type of skin inflammation)     Renal cell carcinoma

## 2019-12-10 NOTE — H&P PST ADULT - NEGATIVE GENERAL GENITOURINARY SYMPTOMS
no renal colic/no hematuria/no bladder infections/no dysuria no renal colic/no dysuria/no bladder infections/no flank pain L/no flank pain R/no urine discoloration/no hematuria

## 2019-12-10 NOTE — H&P PST ADULT - NEGATIVE ENMT SYMPTOMS
no vertigo/no ear pain/no sinus symptoms/no tinnitus/no dry mouth/no nasal obstruction/no throat pain/no dysphagia/no hearing difficulty

## 2019-12-10 NOTE — H&P PST ADULT - HISTORY OF PRESENT ILLNESS
64 y/o male with PMH A. Fib on xarelto, polycythemia vera, and bladder cancer pt s/p TURBT 2019.  Pt complain of increase in urinary frequency x several years.  Pt is scheduled for Cystoscopy with Botox on 12/17/19. 64 y/o male with PMH A. Fib on xarelto, polycythemia vera, HTN and bladder cancer pt s/p TURBT 2019, partial nephrectomy 2012.  Pt complain of increase in urinary frequency x several years.  Pt is scheduled for Cystoscopy with Botox on 12/17/19. 62 y/o male with PMH A. Fib on xarelto, polycythemia vera, HTN and bladder cancer pt s/p TURBT 2019, partial nephrectomy 2012.  Pt complain of increase in urinary frequency x several years.  Pt presents with preop Dx overactive bladder now scheduled for Cystoscopy with Botox on 12/17/19.

## 2019-12-10 NOTE — H&P PST ADULT - NEGATIVE PSYCHIATRIC SYMPTOMS
no suicidal ideation/no depression/no anxiety no anxiety/no suicidal ideation/no depression/no insomnia

## 2019-12-10 NOTE — H&P PST ADULT - NSICDXFAMILYHX_GEN_ALL_CORE_FT
FAMILY HISTORY:  Family history of CHF (congestive heart failure), parents  Family history of diabetes mellitus (DM), brother  Family history of kidney disease, mother and brother    Sibling  Still living? Unknown  Family history of diabetes mellitus, Age at diagnosis: Age Unknown

## 2019-12-10 NOTE — H&P PST ADULT - RS GEN PE MLT RESP DETAILS PC
airway patent/no rhonchi/respirations non-labored/good air movement/no wheezes/clear to auscultation bilaterally/breath sounds equal/no rales

## 2019-12-10 NOTE — H&P PST ADULT - MARITAL STATUS
/2 children (twin boys),  mother  89y/o chf, father  89y/o chf, 1 brother a&w 2 children (twin boys),/

## 2019-12-10 NOTE — H&P PST ADULT - CARDIOVASCULAR COMMENTS
Hx afib on Xarelto.  Pt was advised by Cardio to stop 2 days before as per cardiologist pre-surgical note

## 2019-12-10 NOTE — H&P PST ADULT - HEMATOLOGY/LYMPHATICS COMMENTS
PMH Polycythemia vera.  Well controlled with current medication. PMH A. Fib on Xarelto PMH Polycythemia vera Well controlled with current medication. PMH A. Fib on Xarelto

## 2019-12-10 NOTE — H&P PST ADULT - NEGATIVE SKIN SYMPTOMS
no rash/no change in size/color of mole/PMH cellulitis of LLE, Psoriasis of hands/no itching/no dryness

## 2019-12-10 NOTE — H&P PST ADULT - NS PRO REGISTERED ORGAN DONOR
[Weight management counseling provided] : Weight management [Healthy eating counseling provided] : healthy eating [Activity counseling provided] : activity No

## 2019-12-10 NOTE — H&P PST ADULT - NSICDXPASTSURGICALHX_GEN_ALL_CORE_FT
PAST SURGICAL HISTORY:  H/O partial nephrectomy right-12/17/12    Left varicocele     Melanoma in situ of back [phone], [phone]    Mohs defect mohs procedure bilateral sides of nose with skin graft from clavicular areas 7/2016    S/P cystoscopy TURBT-05/2012    S/P cystoscopy 5/15/14

## 2019-12-10 NOTE — H&P PST ADULT - NEGATIVE CARDIOVASCULAR SYMPTOMS
no orthopnea/no palpitations/no claudication/no chest pain/no dyspnea on exertion no peripheral edema/no palpitations/no claudication/no chest pain/no dyspnea on exertion/no orthopnea

## 2019-12-10 NOTE — H&P PST ADULT - PRIMARY CARE PROVIDER
Dr Lamas (cardiologist) [phone] or [phone] Dr Lamas (cardiologist and PCP) [phone] or [phone]

## 2019-12-10 NOTE — H&P PST ADULT - ASSESSMENT
Problem: Overactive bladder  Assessment and Plan: Patient  scheduled for Cystoscopy with Botox on 12/17/19.    Patient provided with verbal and written presurgical instructions; verbalized understanding  with teach back.    Patient provided with famotidine for GI prophylaxis; verbalized understanding.      STOP BANG score met, OR booking notified  OR booking notified of allergies and polycythemia vera    Comparison EKG, Echo and Stress test in chart     Medical/ Cardiac evaluation requested by surgeon and PST for,   Case discussed with  Problem: Overactive bladder  Assessment and Plan: Patient  scheduled for Cystoscopy with Botox on 12/17/19.    Patient provided with verbal and written presurgical instructions; verbalized understanding  with teach back.    Patient provided with famotidine for GI prophylaxis; verbalized understanding.      STOP BANG score met, OR booking notified  OR booking notified of allergies and polycythemia vera    Comparison EKG, Echo and Stress test in chart     Medical/ Cardiac evaluation requested by surgeon and PST for xarelto plan    Patient instructed to stop Coq10, multivitamin, Omega 3 today    Problem: Hypertension/AFIB   Assessment and Plan: Patient instructed to take metoprolol, diltiazem on day of procedure, verbalized understanding.  Patient instructed to hold Xarelto after 12/14/19, verbalized understanding Problem: Overactive bladder  Assessment and Plan: Patient  scheduled for Cystoscopy with Botox on 12/17/19.    Patient provided with verbal and written presurgical instructions; verbalized understanding  with teach back.    Patient provided with famotidine for GI prophylaxis; verbalized understanding.      STOP BANG score met, OR booking notified  OR booking notified of allergies and polycythemia vera    Comparison EKG, Echo and Stress test in chart     Medical/ Cardiac evaluation requested by surgeon and PST for xarelto plan  Last Heme note requested  Case discussed with Dr Terry     Patient instructed to stop Coq10, multivitamin, Omega 3 today    Problem: Hypertension/AFIB   Assessment and Plan: Patient instructed to take metoprolol, diltiazem on day of procedure, verbalized understanding.  Patient instructed to hold Xarelto after 12/14/19 (2 days prior to surgery ), verbalized understanding

## 2019-12-11 LAB
BACTERIA UR CULT: SIGNIFICANT CHANGE UP
SPECIMEN SOURCE: SIGNIFICANT CHANGE UP

## 2019-12-12 ENCOUNTER — RX RENEWAL (OUTPATIENT)
Age: 64
End: 2019-12-12

## 2019-12-12 NOTE — ASSESSMENT
[Ischemic Heart Disease] : Ischemic Heart Disease - No (0) [High Risk Surgery - Intraperitoneal, Intrathoracic or Supringuinal Vascular Procedures] : High Risk Surgery - Intraperitoneal, Intrathoracic or Supringuinal Vascular Procedures - No (0) [Congestive Heart Failure] : Congestive Heart Failure - No (0) [Creatinine >= 2mg/dL (1 Point)] : Creatinine >= 2mg/dL - No (0) [Prior Cerebrovascular Accident or TIA] : Prior Cerebrovascular Accident or TIA - No (0) [Insulin-dependent Diabetic (1 Point)] : Insulin-dependent Diabetic - No (0) [0] : 0 , RCRI Class: I, Risk of Post-Op Cardiac Complications: 0.4%, Procedure Risk: Low-Risk [No Further Testing Recommended] : no further testing recommended [Modify anticoagulant treatment prior to procedure] : Modify anticoagulant treatment prior to procedure [Patient Optimized for Surgery] : Patient optimized for surgery [FreeTextEntry5] : Stop Xarelto 48 hours prior to the procedure

## 2019-12-12 NOTE — RESULTS/DATA
[] : results reviewed [de-identified] : WBC 3.4, H/H normal. He is on Jakafi for polycythemia vera [de-identified] : normal [de-identified] : sinus bradycardia, WNL [de-identified] : U/A normal

## 2019-12-12 NOTE — HISTORY OF PRESENT ILLNESS
[Atrial Fibrillation] : atrial fibrillation [Chronic Anticoagulation] : chronic anticoagulation [Self] : previous adverse anesthesia reaction [(Patient denies any chest pain, claudication, dyspnea on exertion, orthopnea, palpitations or syncope)] : Patient denies any chest pain, claudication, dyspnea on exertion, orthopnea, palpitations or syncope [Excellent (>10 METs)] : Excellent (>10 METs) [Aortic Stenosis] : no aortic stenosis [Coronary Artery Disease] : no coronary artery disease [Recent Myocardial Infarction] : no recent myocardial infarction [Implantable Device/Pacemaker] : no implantable device/pacemaker [Asthma] : no asthma [COPD] : no COPD [Sleep Apnea] : no sleep apnea [Smoker] : not a smoker [Chronic Kidney Disease] : no chronic kidney disease [Diabetes] : no diabetes [FreeTextEntry1] : Botox injection [FreeTextEntry2] : 12/17//2019 [FreeTextEntry3] : Gemma Jeter MD [FreeTextEntry4] : Patient has been refractory to therapy for overactive bladder and urinates frequently. He will undergo botox injection to try to control this. \par He was previously scheduled for botox but was found to have bladder cancer and was treated with 6 runs of mitomycin. Had a cystoscopy in October 2019 which demonstrated resolution of the bladder cancer. \par He had an episode of atrial fibrillation in the past and is on Xarelto, not warfarin (Coumadin). \par Developed URI over the last week. Was treated with Mucinex and Airborne. Has not resolved. Fever, chills-none. Cough -mild, productive of yellowish sputum. Nose is congested and dripping. Headache--none. Postnasal drip has resolved. Sore throat--resolved. \par  [FreeTextEntry7] : Nuclear stress test July 16, 2018 demonstrated normal perfusion and he attained 10.1 METS. .

## 2019-12-12 NOTE — REVIEW OF SYSTEMS
[Lower Ext Edema] : lower extremity edema [Frequency] : frequency [Back Pain] : back pain [Negative] : Psychiatric [Fever] : no fever [Chills] : no chills [Fatigue] : no fatigue [Hot Flashes] : no hot flashes [Night Sweats] : no night sweats [Chest Pain] : no chest pain [Palpitations] : no palpitations [Wheezing] : no wheezing [Shortness Of Breath] : no shortness of breath [Dyspnea on Exertion] : not dyspnea on exertion [Cough] : no cough [Nausea] : no nausea [Constipation] : no constipation [Diarrhea] : no diarrhea [Vomiting] : no vomiting [Hematuria] : no hematuria [Joint Pain] : no joint pain [Itching] : no itching [Easy Bleeding] : no easy bleeding [Easy Bruising] : no easy bruising [Swollen Glands] : no swollen glands [FreeTextEntry5] : Unilateral edema of the right lower extremity

## 2019-12-12 NOTE — CONSULT LETTER
[Dear  ___] : Dear  [unfilled], [Please see my note below.] : Please see my note below. [Consult Letter:] : I had the pleasure of evaluating your patient, [unfilled]. [Consult Closing:] : Thank you very much for allowing me to participate in the care of this patient.  If you have any questions, please do not hesitate to contact me. [Sincerely,] : Sincerely, [FreeTextEntry3] : Chichi Lamas MD [facility]

## 2019-12-12 NOTE — PHYSICAL EXAM
[No Acute Distress] : no acute distress [Well Nourished] : well nourished [Well Developed] : well developed [No JVD] : no jugular venous distention [Well-Appearing] : well-appearing [Normal Voice/Communication] : normal voice/communication [Thyroid Normal, No Nodules] : the thyroid was normal and there were no nodules present [No Lymphadenopathy] : no lymphadenopathy [Supple] : supple [No Accessory Muscle Use] : no accessory muscle use [Clear to Auscultation] : lungs were clear to auscultation bilaterally [No Respiratory Distress] : no respiratory distress  [Normal S1, S2] : normal S1 and S2 [Normal Rate] : normal rate  [Regular Rhythm] : with a regular rhythm [No Carotid Bruits] : no carotid bruits [Soft] : abdomen soft [Non Tender] : non-tender [Non-distended] : non-distended [Normal Bowel Sounds] : normal bowel sounds [Normal Affect] : the affect was normal [Memory Grossly Normal] : memory grossly normal [Speech Grossly Normal] : speech grossly normal [Alert and Oriented x3] : oriented to person, place, and time [Normal Mood] : the mood was normal [de-identified] : Left lower extremity has greater circumference than right with mild erythema, no warmth on ankita left shin. Scab at the top of the erythema

## 2019-12-12 NOTE — PLAN
[FreeTextEntry1] : The patient is on Xarelto which has a shorter half-life than Coumadin. Less than 48 hours are required to bring the coagulation factors to baseline. He should not be off the Xarelto for more than 48 hours as this may allow an embolus. \par Active atrial fibrillation is not a contraindication to continuation of the surgery if the patient remains hemodynamically stable, although attempts to control rate should be done. \par For URI: Afrin nasal spray 2 days on and one day off\par Mucinex \par Vit C 1000 mg \par nasal saline BID\par Greater than 50% of the 25 minutes encounter  with the patient was spent face to face on coordination of care and counseling regarding surgery/clearance procedure and URI treatment                .\par \par

## 2019-12-16 ENCOUNTER — TRANSCRIPTION ENCOUNTER (OUTPATIENT)
Age: 64
End: 2019-12-16

## 2019-12-17 ENCOUNTER — APPOINTMENT (OUTPATIENT)
Dept: UROLOGY | Facility: AMBULATORY SURGERY CENTER | Age: 64
End: 2019-12-17

## 2019-12-17 ENCOUNTER — OUTPATIENT (OUTPATIENT)
Dept: OUTPATIENT SERVICES | Facility: HOSPITAL | Age: 64
LOS: 1 days | Discharge: HOME | End: 2019-12-17
Payer: COMMERCIAL

## 2019-12-17 VITALS
SYSTOLIC BLOOD PRESSURE: 106 MMHG | OXYGEN SATURATION: 99 % | HEART RATE: 68 BPM | TEMPERATURE: 97 F | RESPIRATION RATE: 16 BRPM | DIASTOLIC BLOOD PRESSURE: 66 MMHG

## 2019-12-17 VITALS
HEIGHT: 71.5 IN | HEART RATE: 56 BPM | OXYGEN SATURATION: 98 % | SYSTOLIC BLOOD PRESSURE: 109 MMHG | WEIGHT: 218.04 LBS | DIASTOLIC BLOOD PRESSURE: 63 MMHG | RESPIRATION RATE: 14 BRPM | TEMPERATURE: 97 F

## 2019-12-17 DIAGNOSIS — M95.9 ACQUIRED DEFORMITY OF MUSCULOSKELETAL SYSTEM, UNSPECIFIED: Chronic | ICD-10-CM

## 2019-12-17 DIAGNOSIS — N32.81 OVERACTIVE BLADDER: ICD-10-CM

## 2019-12-17 DIAGNOSIS — Z98.89 OTHER SPECIFIED POSTPROCEDURAL STATES: Chronic | ICD-10-CM

## 2019-12-17 PROBLEM — M62.50 MUSCLE WASTING AND ATROPHY, NOT ELSEWHERE CLASSIFIED, UNSPECIFIED SITE: Chronic | Status: ACTIVE | Noted: 2019-12-10

## 2019-12-17 PROBLEM — I48.91 UNSPECIFIED ATRIAL FIBRILLATION: Chronic | Status: ACTIVE | Noted: 2019-12-10

## 2019-12-17 PROCEDURE — 52287 CYSTOSCOPY CHEMODENERVATION: CPT

## 2019-12-17 NOTE — ASU PREOPERATIVE ASSESSMENT, ADULT (IPARK ONLY) - TEACHING/LEARNING LEARNING PREFERENCES
pictorial/verbal instruction/individual instruction/written material/skill demonstration/group instruction

## 2020-01-02 ENCOUNTER — APPOINTMENT (OUTPATIENT)
Dept: UROLOGY | Facility: CLINIC | Age: 65
End: 2020-01-02

## 2020-01-03 ENCOUNTER — RX RENEWAL (OUTPATIENT)
Age: 65
End: 2020-01-03

## 2020-01-03 ENCOUNTER — APPOINTMENT (OUTPATIENT)
Dept: UROLOGY | Facility: CLINIC | Age: 65
End: 2020-01-03
Payer: COMMERCIAL

## 2020-01-03 DIAGNOSIS — Z00.00 ENCOUNTER FOR GENERAL ADULT MEDICAL EXAMINATION W/OUT ABNORMAL FINDINGS: ICD-10-CM

## 2020-01-03 PROCEDURE — 99213 OFFICE O/P EST LOW 20 MIN: CPT

## 2020-01-06 LAB
BILIRUB UR QL STRIP: NORMAL
CLARITY UR: CLEAR
COLLECTION METHOD: NORMAL
GLUCOSE UR-MCNC: NORMAL
HCG UR QL: 0.2 EU/DL
HGB UR QL STRIP.AUTO: NORMAL
KETONES UR-MCNC: NORMAL
LEUKOCYTE ESTERASE UR QL STRIP: NORMAL
NITRITE UR QL STRIP: NORMAL
PH UR STRIP: 5.5
PROT UR STRIP-MCNC: NORMAL
SP GR UR STRIP: 1.02

## 2020-02-18 NOTE — ASU DISCHARGE PLAN (ADULT/PEDIATRIC). - ACTIVITY LEVEL
I have reviewed and confirmed nurses' notes... no heavy lifting/no sports/gym no sports/gym/no exercise/no heavy lifting/until seen and cleared by M.D.

## 2020-03-16 ENCOUNTER — RX RENEWAL (OUTPATIENT)
Age: 65
End: 2020-03-16

## 2020-03-17 ENCOUNTER — APPOINTMENT (OUTPATIENT)
Dept: UROLOGY | Facility: CLINIC | Age: 65
End: 2020-03-17

## 2020-03-19 ENCOUNTER — TRANSCRIPTION ENCOUNTER (OUTPATIENT)
Age: 65
End: 2020-03-19

## 2020-04-30 DIAGNOSIS — R44.8 OTHER SYMPTOMS AND SIGNS INVOLVING GENERAL SENSATIONS AND PERCEPTIONS: ICD-10-CM

## 2020-04-30 RX ORDER — TAMSULOSIN HYDROCHLORIDE 0.4 MG/1
0.4 CAPSULE ORAL
Qty: 90 | Refills: 3 | Status: DISCONTINUED | COMMUNITY
Start: 2019-07-16 | End: 2020-04-30

## 2020-05-28 LAB
25(OH)D3 SERPL-MCNC: 34 NG/ML
ALBUMIN SERPL ELPH-MCNC: 4.7 G/DL
ALP BLD-CCNC: 58 U/L
ALT SERPL-CCNC: 36 U/L
ANION GAP SERPL CALC-SCNC: 14 MMOL/L
AST SERPL-CCNC: 32 U/L
BASOPHILS # BLD AUTO: 0.02 K/UL
BASOPHILS NFR BLD AUTO: 0.5 %
BILIRUB SERPL-MCNC: 0.5 MG/DL
BUN SERPL-MCNC: 17 MG/DL
CALCIUM SERPL-MCNC: 9.7 MG/DL
CHLORIDE SERPL-SCNC: 103 MMOL/L
CHOLEST SERPL-MCNC: 235 MG/DL
CHOLEST/HDLC SERPL: 4.6 RATIO
CK SERPL-CCNC: 399 U/L
CO2 SERPL-SCNC: 21 MMOL/L
CREAT SERPL-MCNC: 1.12 MG/DL
EOSINOPHIL # BLD AUTO: 0.03 K/UL
EOSINOPHIL NFR BLD AUTO: 0.8 %
ESTIMATED AVERAGE GLUCOSE: 120 MG/DL
FOLATE SERPL-MCNC: >20 NG/ML
GLUCOSE SERPL-MCNC: 112 MG/DL
HBA1C MFR BLD HPLC: 5.8 %
HCT VFR BLD CALC: 45.6 %
HDLC SERPL-MCNC: 51 MG/DL
HGB BLD-MCNC: 14.3 G/DL
IMM GRANULOCYTES NFR BLD AUTO: 0.5 %
LDLC SERPL CALC-MCNC: 167 MG/DL
LYMPHOCYTES # BLD AUTO: 0.91 K/UL
LYMPHOCYTES NFR BLD AUTO: 24.4 %
MAN DIFF?: NORMAL
MCHC RBC-ENTMCNC: 27.5 PG
MCHC RBC-ENTMCNC: 31.4 GM/DL
MCV RBC AUTO: 87.7 FL
MONOCYTES # BLD AUTO: 0.35 K/UL
MONOCYTES NFR BLD AUTO: 9.4 %
NEUTROPHILS # BLD AUTO: 2.4 K/UL
NEUTROPHILS NFR BLD AUTO: 64.4 %
PLATELET # BLD AUTO: 278 K/UL
POTASSIUM SERPL-SCNC: 4.7 MMOL/L
PROT SERPL-MCNC: 6.5 G/DL
PSA SERPL-MCNC: 0.41 NG/ML
RBC # BLD: 5.2 M/UL
RBC # FLD: 15.7 %
SARS-COV-2 IGG SERPL IA-ACNC: 0.01 INDEX
SARS-COV-2 IGG SERPL QL IA: NEGATIVE
SODIUM SERPL-SCNC: 138 MMOL/L
T4 FREE SERPL-MCNC: 1.2 NG/DL
TRIGL SERPL-MCNC: 79 MG/DL
TSH SERPL-ACNC: 2.39 UIU/ML
VIT B12 SERPL-MCNC: 889 PG/ML
WBC # FLD AUTO: 3.73 K/UL

## 2020-06-16 ENCOUNTER — APPOINTMENT (OUTPATIENT)
Dept: UROLOGY | Facility: CLINIC | Age: 65
End: 2020-06-16
Payer: COMMERCIAL

## 2020-06-16 ENCOUNTER — OUTPATIENT (OUTPATIENT)
Dept: OUTPATIENT SERVICES | Facility: HOSPITAL | Age: 65
LOS: 1 days | End: 2020-06-16
Payer: COMMERCIAL

## 2020-06-16 VITALS — TEMPERATURE: 97.9 F

## 2020-06-16 VITALS — SYSTOLIC BLOOD PRESSURE: 145 MMHG | RESPIRATION RATE: 16 BRPM | DIASTOLIC BLOOD PRESSURE: 79 MMHG | HEART RATE: 79 BPM

## 2020-06-16 DIAGNOSIS — M95.9 ACQUIRED DEFORMITY OF MUSCULOSKELETAL SYSTEM, UNSPECIFIED: Chronic | ICD-10-CM

## 2020-06-16 DIAGNOSIS — Z98.89 OTHER SPECIFIED POSTPROCEDURAL STATES: Chronic | ICD-10-CM

## 2020-06-16 DIAGNOSIS — Z85.528 PERSONAL HISTORY OF OTHER MALIGNANT NEOPLASM OF KIDNEY: ICD-10-CM

## 2020-06-16 DIAGNOSIS — R35.0 FREQUENCY OF MICTURITION: ICD-10-CM

## 2020-06-16 DIAGNOSIS — Z85.51 PERSONAL HISTORY OF MALIGNANT NEOPLASM OF BLADDER: ICD-10-CM

## 2020-06-16 PROCEDURE — 52000 CYSTOURETHROSCOPY: CPT

## 2020-06-16 PROCEDURE — 76775 US EXAM ABDO BACK WALL LIM: CPT | Mod: 26

## 2020-06-16 PROCEDURE — 76775 US EXAM ABDO BACK WALL LIM: CPT

## 2020-06-17 ENCOUNTER — RX RENEWAL (OUTPATIENT)
Age: 65
End: 2020-06-17

## 2020-06-18 LAB — URINE CYTOLOGY: NORMAL

## 2020-06-23 NOTE — PROGRESS NOTE ADULT - PROBLEM SELECTOR PLAN 7
Permissive hypertension for the first 48 hours   On Jakafi  - nonformulary at hospital, pts family will bring in from home and then resume 15mg BID On Jakafi  - nonformulary at hospital, pts family will bring in from home and then resume 15mg BID  - Discussed with outpt heme/onc Dr. Beard and he recommends to continue with Jakafi.

## 2020-07-21 ENCOUNTER — RX RENEWAL (OUTPATIENT)
Age: 65
End: 2020-07-21

## 2020-07-29 RX ORDER — RIVAROXABAN 20 MG/1
20 TABLET, FILM COATED ORAL
Qty: 90 | Refills: 1 | Status: DISCONTINUED | COMMUNITY
Start: 2020-07-21 | End: 2020-07-29

## 2020-07-31 NOTE — ASU PREOPERATIVE ASSESSMENT, ADULT (IPARK ONLY) - PHONE #
Ongoing SW/CM Assessment/Plan of Care Note     See SW/CM flowsheets for goals and other objective data.    Patient/Family discharge goal (s):  Home with family support    Progress note:   Met with patient to discuss anticipated discharge.  Patient denies needs at discharge.    Discussed management of own health when getting ready to leave hospital.  Patient verbalized a good understanding of the things he is responsible for in managing  his own health.               [phone]

## 2020-09-16 ENCOUNTER — APPOINTMENT (OUTPATIENT)
Dept: INTERNAL MEDICINE | Facility: CLINIC | Age: 65
End: 2020-09-16
Payer: COMMERCIAL

## 2020-09-16 DIAGNOSIS — D72.819 DECREASED WHITE BLOOD CELL COUNT, UNSPECIFIED: ICD-10-CM

## 2020-09-16 PROCEDURE — G0008: CPT

## 2020-09-16 PROCEDURE — 90686 IIV4 VACC NO PRSV 0.5 ML IM: CPT

## 2020-09-18 LAB
ALBUMIN SERPL ELPH-MCNC: 4.8 G/DL
ALP BLD-CCNC: 52 U/L
ALT SERPL-CCNC: 38 U/L
ANION GAP SERPL CALC-SCNC: 14 MMOL/L
AST SERPL-CCNC: 45 U/L
BASOPHILS # BLD AUTO: 0.02 K/UL
BASOPHILS NFR BLD AUTO: 0.6 %
BILIRUB SERPL-MCNC: 0.6 MG/DL
BUN SERPL-MCNC: 21 MG/DL
CALCIUM SERPL-MCNC: 9.2 MG/DL
CHLORIDE SERPL-SCNC: 102 MMOL/L
CHOLEST SERPL-MCNC: 225 MG/DL
CHOLEST/HDLC SERPL: 4.5 RATIO
CK SERPL-CCNC: 939 U/L
CO2 SERPL-SCNC: 25 MMOL/L
CREAT SERPL-MCNC: 1.19 MG/DL
EOSINOPHIL # BLD AUTO: 0.03 K/UL
EOSINOPHIL NFR BLD AUTO: 0.9 %
GLUCOSE SERPL-MCNC: 80 MG/DL
HCT VFR BLD CALC: 41.6 %
HDLC SERPL-MCNC: 50 MG/DL
HGB BLD-MCNC: 12.6 G/DL
IMM GRANULOCYTES NFR BLD AUTO: 0.3 %
LDLC SERPL CALC-MCNC: 157 MG/DL
LYMPHOCYTES # BLD AUTO: 0.75 K/UL
LYMPHOCYTES NFR BLD AUTO: 23.5 %
MAN DIFF?: NORMAL
MCHC RBC-ENTMCNC: 28.1 PG
MCHC RBC-ENTMCNC: 30.3 GM/DL
MCV RBC AUTO: 92.7 FL
MONOCYTES # BLD AUTO: 0.26 K/UL
MONOCYTES NFR BLD AUTO: 8.2 %
NEUTROPHILS # BLD AUTO: 2.12 K/UL
NEUTROPHILS NFR BLD AUTO: 66.5 %
PLATELET # BLD AUTO: 232 K/UL
POTASSIUM SERPL-SCNC: 4.4 MMOL/L
PROT SERPL-MCNC: 6.6 G/DL
RBC # BLD: 4.49 M/UL
RBC # FLD: 15.9 %
SODIUM SERPL-SCNC: 140 MMOL/L
TRIGL SERPL-MCNC: 94 MG/DL
WBC # FLD AUTO: 3.19 K/UL

## 2020-09-24 ENCOUNTER — OUTPATIENT (OUTPATIENT)
Dept: OUTPATIENT SERVICES | Facility: HOSPITAL | Age: 65
LOS: 1 days | End: 2020-09-24
Payer: COMMERCIAL

## 2020-09-24 ENCOUNTER — APPOINTMENT (OUTPATIENT)
Dept: UROLOGY | Facility: CLINIC | Age: 65
End: 2020-09-24
Payer: COMMERCIAL

## 2020-09-24 VITALS — SYSTOLIC BLOOD PRESSURE: 109 MMHG | HEART RATE: 65 BPM | DIASTOLIC BLOOD PRESSURE: 62 MMHG

## 2020-09-24 VITALS — TEMPERATURE: 97.4 F

## 2020-09-24 DIAGNOSIS — Z98.89 OTHER SPECIFIED POSTPROCEDURAL STATES: Chronic | ICD-10-CM

## 2020-09-24 DIAGNOSIS — M95.9 ACQUIRED DEFORMITY OF MUSCULOSKELETAL SYSTEM, UNSPECIFIED: Chronic | ICD-10-CM

## 2020-09-24 DIAGNOSIS — R35.0 FREQUENCY OF MICTURITION: ICD-10-CM

## 2020-09-24 PROCEDURE — 52000 CYSTOURETHROSCOPY: CPT

## 2020-09-24 PROCEDURE — 88112 CYTOPATH CELL ENHANCE TECH: CPT | Mod: 26

## 2020-09-28 LAB — URINE CYTOLOGY: NORMAL

## 2020-10-08 DIAGNOSIS — Z85.51 PERSONAL HISTORY OF MALIGNANT NEOPLASM OF BLADDER: ICD-10-CM

## 2020-12-01 ENCOUNTER — APPOINTMENT (OUTPATIENT)
Dept: UROLOGY | Facility: CLINIC | Age: 65
End: 2020-12-01
Payer: COMMERCIAL

## 2020-12-01 ENCOUNTER — APPOINTMENT (OUTPATIENT)
Dept: UROLOGY | Facility: CLINIC | Age: 65
End: 2020-12-01

## 2020-12-01 ENCOUNTER — OUTPATIENT (OUTPATIENT)
Dept: OUTPATIENT SERVICES | Facility: HOSPITAL | Age: 65
LOS: 1 days | End: 2020-12-01
Payer: COMMERCIAL

## 2020-12-01 VITALS — SYSTOLIC BLOOD PRESSURE: 129 MMHG | RESPIRATION RATE: 16 BRPM | HEART RATE: 72 BPM | DIASTOLIC BLOOD PRESSURE: 76 MMHG

## 2020-12-01 DIAGNOSIS — M95.9 ACQUIRED DEFORMITY OF MUSCULOSKELETAL SYSTEM, UNSPECIFIED: Chronic | ICD-10-CM

## 2020-12-01 DIAGNOSIS — R35.0 FREQUENCY OF MICTURITION: ICD-10-CM

## 2020-12-01 DIAGNOSIS — Z98.89 OTHER SPECIFIED POSTPROCEDURAL STATES: Chronic | ICD-10-CM

## 2020-12-01 PROCEDURE — 99072 ADDL SUPL MATRL&STAF TM PHE: CPT

## 2020-12-01 PROCEDURE — 52000 CYSTOURETHROSCOPY: CPT

## 2020-12-01 PROCEDURE — 76775 US EXAM ABDO BACK WALL LIM: CPT | Mod: 26

## 2020-12-01 PROCEDURE — 99213 OFFICE O/P EST LOW 20 MIN: CPT | Mod: 25

## 2020-12-01 PROCEDURE — 88112 CYTOPATH CELL ENHANCE TECH: CPT | Mod: 26

## 2020-12-01 PROCEDURE — 76775 US EXAM ABDO BACK WALL LIM: CPT

## 2020-12-01 NOTE — HISTORY OF PRESENT ILLNESS
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  Prior ultrasound showed 1.5 cm soft tissue at the site of prior partial nephrectomy.  No vascular inflow.  On today's ultrasound, again similar findings.\par \par Continues to have significant frequency and urgency.  Has failed anticholinergic medications and mirabegron in past.  Was seen by Dr. Jeter last year.  UDS performed and offered Interstim or intravesical Botox injection but patient declined for now.\par \par Patient denies gross hematuria, dysuria or other s/s UTI.  \par Patient denies abdominal pain, flank pain, lower extremity edema, anorexia, unexpected weight loss, excessive fatigue, fever/chills, nausea/vomiting.\par \par There have not been significant changes in medical/surgical hx since the last visit.\par The medication list and allergies were reviewed.\par

## 2020-12-01 NOTE — ASSESSMENT
[FreeTextEntry1] : Ultrasound results reviewed.  Recommend CT scan abdomen with and without IV contrast to confirm no evidence of local recurrence.  Ultrasound findings more consistent with surgical scar.\par \par Office cystoscopy performed today which showed no evidence of bladder tumor recurrence.  Follow-up surveillance cystoscopy in 3 months.\par \par We will call with results of CT scan.\par \par BPH: Remains on tamsulosin 0.4 mg.  Patient is scheduled to undergo cataract surgery in 2 weeks.  I reviewed the association with tamsulosin and floppy iris syndrome with cataract surgery.  Patient will discuss with his ophthalmologist.\par

## 2020-12-02 LAB — URINE CYTOLOGY: NORMAL

## 2020-12-04 DIAGNOSIS — Z85.51 PERSONAL HISTORY OF MALIGNANT NEOPLASM OF BLADDER: ICD-10-CM

## 2020-12-04 DIAGNOSIS — Z85.528 PERSONAL HISTORY OF OTHER MALIGNANT NEOPLASM OF KIDNEY: ICD-10-CM

## 2020-12-07 ENCOUNTER — APPOINTMENT (OUTPATIENT)
Dept: INTERNAL MEDICINE | Facility: CLINIC | Age: 65
End: 2020-12-07
Payer: COMMERCIAL

## 2020-12-07 ENCOUNTER — NON-APPOINTMENT (OUTPATIENT)
Age: 65
End: 2020-12-07

## 2020-12-07 VITALS
WEIGHT: 224 LBS | SYSTOLIC BLOOD PRESSURE: 120 MMHG | BODY MASS INDEX: 29.69 KG/M2 | DIASTOLIC BLOOD PRESSURE: 80 MMHG | HEIGHT: 73 IN | HEART RATE: 49 BPM

## 2020-12-07 DIAGNOSIS — H26.9 UNSPECIFIED CATARACT: ICD-10-CM

## 2020-12-07 DIAGNOSIS — Z85.51 PERSONAL HISTORY OF MALIGNANT NEOPLASM OF BLADDER: ICD-10-CM

## 2020-12-07 DIAGNOSIS — Z11.59 ENCOUNTER FOR SCREENING FOR OTHER VIRAL DISEASES: ICD-10-CM

## 2020-12-07 DIAGNOSIS — S81.802A UNSPECIFIED OPEN WOUND, LEFT LOWER LEG, INITIAL ENCOUNTER: ICD-10-CM

## 2020-12-07 DIAGNOSIS — R74.8 ABNORMAL LEVELS OF OTHER SERUM ENZYMES: ICD-10-CM

## 2020-12-07 DIAGNOSIS — E55.9 VITAMIN D DEFICIENCY, UNSPECIFIED: ICD-10-CM

## 2020-12-07 PROCEDURE — 99072 ADDL SUPL MATRL&STAF TM PHE: CPT

## 2020-12-07 PROCEDURE — 99214 OFFICE O/P EST MOD 30 MIN: CPT | Mod: 25

## 2020-12-07 PROCEDURE — 36415 COLL VENOUS BLD VENIPUNCTURE: CPT

## 2020-12-07 PROCEDURE — 93000 ELECTROCARDIOGRAM COMPLETE: CPT

## 2020-12-07 NOTE — PHYSICAL EXAM
[No Acute Distress] : no acute distress [Well Nourished] : well nourished [Well Developed] : well developed [Well-Appearing] : well-appearing [Normal Voice/Communication] : normal voice/communication [Normal Sclera/Conjunctiva] : normal sclera/conjunctiva [Normal Outer Ear/Nose] : the outer ears and nose were normal in appearance [No JVD] : no jugular venous distention [No Lymphadenopathy] : no lymphadenopathy [Supple] : supple [Thyroid Normal, No Nodules] : the thyroid was normal and there were no nodules present [No Respiratory Distress] : no respiratory distress  [No Accessory Muscle Use] : no accessory muscle use [Clear to Auscultation] : lungs were clear to auscultation bilaterally [Normal Rate] : normal rate  [Regular Rhythm] : with a regular rhythm [Normal S1, S2] : normal S1 and S2 [No Murmur] : no murmur heard [No Carotid Bruits] : no carotid bruits [No Abdominal Bruit] : a ~M bruit was not heard ~T in the abdomen [No Edema] : there was no peripheral edema [No Palpable Aorta] : no palpable aorta [Normal Appearance] : normal in appearance [Soft] : abdomen soft [Non-distended] : non-distended [No Masses] : no abdominal mass palpated [No HSM] : no HSM [Normal Bowel Sounds] : normal bowel sounds [No Hernias] : no hernias [Normal Supraclavicular Nodes] : no supraclavicular lymphadenopathy [Normal Anterior Cervical Nodes] : no anterior cervical lymphadenopathy [No Joint Swelling] : no joint swelling [Normal] : no rash [Coordination Grossly Intact] : coordination grossly intact [No Focal Deficits] : no focal deficits [Speech Grossly Normal] : speech grossly normal [Normal Mood] : the mood was normal [Comprehensive Foot Exam Normal] : Right and left foot were examined and both feet are normal. No ulcers in either foot. Toes are normal and with full ROM.  Normal tactile sensation with monofilament testing throughout both feet [Kyphosis] : no kyphosis [Scoliosis] : no scoliosis

## 2020-12-07 NOTE — CONSULT LETTER
[Dear  ___] : Dear  [unfilled], [Consult Letter:] : I had the pleasure of evaluating your patient, [unfilled]. [Please see my note below.] : Please see my note below. [Consult Closing:] : Thank you very much for allowing me to participate in the care of this patient.  If you have any questions, please do not hesitate to contact me. [Sincerely,] : Sincerely, [FreeTextEntry3] : Chichi Lamas MD

## 2020-12-07 NOTE — ASSESSMENT
[High Risk Surgery - Intraperitoneal, Intrathoracic or Supringuinal Vascular Procedures] : High Risk Surgery - Intraperitoneal, Intrathoracic or Supringuinal Vascular Procedures - No (0) [Ischemic Heart Disease] : Ischemic Heart Disease - No (0) [Congestive Heart Failure] : Congestive Heart Failure - No (0) [Prior Cerebrovascular Accident or TIA] : Prior Cerebrovascular Accident or TIA - No (0) [Creatinine >= 2mg/dL (1 Point)] : Creatinine >= 2mg/dL - No (0) [Insulin-dependent Diabetic (1 Point)] : Insulin-dependent Diabetic - No (0) [0] : 0 , RCRI Class: I, Risk of Post-Op Cardiac Complications: 3.9%, 95% CI for Risk Estimate: 2.8% - 5.4% [Patient Optimized for Surgery] : Patient optimized for surgery [No Further Testing Recommended] : no further testing recommended [Continue anticoagulant treatment as is] : Continue current anticoagulant treatment

## 2020-12-07 NOTE — REVIEW OF SYSTEMS
[Lower Ext Edema] : lower extremity edema [Nocturia] : nocturia [Frequency] : frequency [Joint Stiffness] : joint stiffness [Back Pain] : back pain [Dizziness] : dizziness [Easy Bleeding] : easy bleeding [Easy Bruising] : easy bruising [Negative] : Psychiatric [Fever] : no fever [Chills] : no chills [Fatigue] : no fatigue [Hot Flashes] : no hot flashes [Night Sweats] : no night sweats [Chest Pain] : no chest pain [Palpitations] : no palpitations [Shortness Of Breath] : no shortness of breath [Wheezing] : no wheezing [Cough] : no cough [Dyspnea on Exertion] : not dyspnea on exertion [Nausea] : no nausea [Constipation] : no constipation [Diarrhea] : no diarrhea [Vomiting] : no vomiting [Hematuria] : no hematuria [Joint Pain] : no joint pain [Joint Swelling] : no joint swelling [Itching] : no itching [Skin Rash] : no skin rash [Headache] : no headache [Swollen Glands] : no swollen glands [FreeTextEntry5] : Was treated with support stockings.

## 2020-12-07 NOTE — RESULTS/DATA
[] : results reviewed [de-identified] : decreased WBC consistent with underlying condition, H/H normal. [de-identified] : Sinus bradycardia WNL

## 2020-12-07 NOTE — HISTORY OF PRESENT ILLNESS
[Atrial Fibrillation] : atrial fibrillation [Self] : previous adverse anesthesia reaction [Chronic Anticoagulation] : chronic anticoagulation [(Patient denies any chest pain, claudication, dyspnea on exertion, orthopnea, palpitations or syncope)] : Patient denies any chest pain, claudication, dyspnea on exertion, orthopnea, palpitations or syncope [Excellent (>10 METs)] : Excellent (>10 METs) [Aortic Stenosis] : no aortic stenosis [Coronary Artery Disease] : no coronary artery disease [Recent Myocardial Infarction] : no recent myocardial infarction [Implantable Device/Pacemaker] : no implantable device/pacemaker [Asthma] : no asthma [COPD] : no COPD [Sleep Apnea] : no sleep apnea [Smoker] : not a smoker [Chronic Kidney Disease] : no chronic kidney disease [Diabetes] : no diabetes [FreeTextEntry1] : cataract surgery left 12/16/2020 [FreeTextEntry2] : right eye 1/4/2021 [FreeTextEntry3] : Anthony Amin MD [FreeTextEntry4] : He is going for bilateral cataract surgeries,  on 12/16/2020 and 1/4/2021.\par He was found to have bladder cancer and was treated with 6 runs of mitomycin. Had a cystoscopy in October 2019 which demonstrated resolution of the bladder cancer. He is checked every 3 months and on last check 1 week ago was normal. \par He has had renal cell carcinoma and was recently checked for this as well. Impression is scar tissue at the surgical nephrectomy site and is going for CT scan of the area. \par He had an episode of atrial fibrillation in the past and is on Xarelto, not warfarin (Coumadin). He  had  localized chest pain 2 nights ago, lasted over a few hours off and on, at rest, disappeared spontaneously, nonpleuritic and no SOB. Has had postural lightheadedness for many years. \par He has had polycythemia vera and is on Jakafi. \par  [FreeTextEntry7] : Nuclear stress test July 16, 2018 demonstrated normal perfusion and he attained 10.1 METS. .

## 2020-12-08 ENCOUNTER — RX RENEWAL (OUTPATIENT)
Age: 65
End: 2020-12-08

## 2020-12-08 ENCOUNTER — NON-APPOINTMENT (OUTPATIENT)
Age: 65
End: 2020-12-08

## 2020-12-08 LAB
25(OH)D3 SERPL-MCNC: 36.5 NG/ML
ALBUMIN SERPL ELPH-MCNC: 5 G/DL
ALP BLD-CCNC: 62 U/L
ALT SERPL-CCNC: 49 U/L
ANION GAP SERPL CALC-SCNC: 14 MMOL/L
AST SERPL-CCNC: 50 U/L
BILIRUB SERPL-MCNC: 0.5 MG/DL
BUN SERPL-MCNC: 25 MG/DL
CALCIUM SERPL-MCNC: 10.1 MG/DL
CHLORIDE SERPL-SCNC: 104 MMOL/L
CHOLEST SERPL-MCNC: 235 MG/DL
CK SERPL-CCNC: 690 U/L
CO2 SERPL-SCNC: 23 MMOL/L
CREAT SERPL-MCNC: 1.25 MG/DL
ESTIMATED AVERAGE GLUCOSE: 126 MG/DL
GLUCOSE SERPL-MCNC: 94 MG/DL
HBA1C MFR BLD HPLC: 6 %
HDLC SERPL-MCNC: 49 MG/DL
LDLC SERPL CALC-MCNC: 164 MG/DL
NONHDLC SERPL-MCNC: 186 MG/DL
POTASSIUM SERPL-SCNC: 5.1 MMOL/L
PROT SERPL-MCNC: 7.2 G/DL
SODIUM SERPL-SCNC: 141 MMOL/L
TRIGL SERPL-MCNC: 113 MG/DL

## 2020-12-14 ENCOUNTER — RX RENEWAL (OUTPATIENT)
Age: 65
End: 2020-12-14

## 2021-01-24 ENCOUNTER — RX RENEWAL (OUTPATIENT)
Age: 66
End: 2021-01-24

## 2021-02-12 LAB
ALBUMIN SERPL ELPH-MCNC: 4.4 G/DL
ALP BLD-CCNC: 53 U/L
ALT SERPL-CCNC: 37 U/L
ANION GAP SERPL CALC-SCNC: 10 MMOL/L
AST SERPL-CCNC: 35 U/L
BILIRUB SERPL-MCNC: 0.3 MG/DL
BUN SERPL-MCNC: 22 MG/DL
CALCIUM SERPL-MCNC: 9.6 MG/DL
CHLORIDE SERPL-SCNC: 105 MMOL/L
CHOLEST SERPL-MCNC: 133 MG/DL
CK SERPL-CCNC: 666 U/L
CO2 SERPL-SCNC: 24 MMOL/L
CREAT SERPL-MCNC: 1.29 MG/DL
GLUCOSE SERPL-MCNC: 97 MG/DL
HDLC SERPL-MCNC: 50 MG/DL
LDLC SERPL CALC-MCNC: 69 MG/DL
NONHDLC SERPL-MCNC: 83 MG/DL
POTASSIUM SERPL-SCNC: 5.1 MMOL/L
PROT SERPL-MCNC: 6.3 G/DL
SODIUM SERPL-SCNC: 139 MMOL/L
TRIGL SERPL-MCNC: 71 MG/DL

## 2021-03-01 NOTE — H&P PST ADULT - GASTROINTESTINAL
[address] [place] [address] Dr DAMIAN TOLEDO BEH [facility] EMERGENCY DEPT 
[address] [facility] [id number] [phone] Work/School Note Date: 3/1/2021 To Whom It May concern: 
 
Mohinder Johnson was seen and treated today in the emergency room by the following provider(s): 
Attending Provider: Yonas Grey MD 
Nurse Practitioner: JAIRO Adams. Mohinder Johnson is excused from work/school on 03/01/21 and 03/02/21. She is medically clear to return to work/school on 3/3/2021.   
 
 
Sincerely, 
 
 
 
 
JAIRO Villanueva  
 
 
 details… detailed exam

## 2021-03-08 ENCOUNTER — APPOINTMENT (OUTPATIENT)
Dept: UROLOGY | Facility: CLINIC | Age: 66
End: 2021-03-08
Payer: COMMERCIAL

## 2021-03-08 ENCOUNTER — OUTPATIENT (OUTPATIENT)
Dept: OUTPATIENT SERVICES | Facility: HOSPITAL | Age: 66
LOS: 1 days | End: 2021-03-08
Payer: COMMERCIAL

## 2021-03-08 VITALS — HEART RATE: 91 BPM | RESPIRATION RATE: 16 BRPM | SYSTOLIC BLOOD PRESSURE: 142 MMHG | DIASTOLIC BLOOD PRESSURE: 78 MMHG

## 2021-03-08 DIAGNOSIS — R35.0 FREQUENCY OF MICTURITION: ICD-10-CM

## 2021-03-08 DIAGNOSIS — Z98.89 OTHER SPECIFIED POSTPROCEDURAL STATES: Chronic | ICD-10-CM

## 2021-03-08 DIAGNOSIS — M95.9 ACQUIRED DEFORMITY OF MUSCULOSKELETAL SYSTEM, UNSPECIFIED: Chronic | ICD-10-CM

## 2021-03-08 PROCEDURE — 88112 CYTOPATH CELL ENHANCE TECH: CPT | Mod: 26

## 2021-03-08 PROCEDURE — 52000 CYSTOURETHROSCOPY: CPT

## 2021-03-09 DIAGNOSIS — Z85.51 PERSONAL HISTORY OF MALIGNANT NEOPLASM OF BLADDER: ICD-10-CM

## 2021-03-09 LAB — URINE CYTOLOGY: NORMAL

## 2021-03-12 ENCOUNTER — RX RENEWAL (OUTPATIENT)
Age: 66
End: 2021-03-12

## 2021-03-16 DIAGNOSIS — Z23 ENCOUNTER FOR IMMUNIZATION: ICD-10-CM

## 2021-03-18 DIAGNOSIS — R31.0 GROSS HEMATURIA: ICD-10-CM

## 2021-03-19 LAB
APPEARANCE: CLEAR
BILIRUBIN URINE: NEGATIVE
BLOOD URINE: NEGATIVE
COLOR: NORMAL
GLUCOSE QUALITATIVE U: NEGATIVE
KETONES URINE: NEGATIVE
LEUKOCYTE ESTERASE URINE: NEGATIVE
NITRITE URINE: NEGATIVE
PH URINE: 7
PROTEIN URINE: NORMAL
SPECIFIC GRAVITY URINE: 1.02
UROBILINOGEN URINE: NORMAL

## 2021-03-22 LAB — BACTERIA UR CULT: ABNORMAL

## 2021-04-07 ENCOUNTER — RX RENEWAL (OUTPATIENT)
Age: 66
End: 2021-04-07

## 2021-04-13 RX ORDER — AMOXICILLIN AND CLAVULANATE POTASSIUM 875; 125 MG/1; MG/1
875-125 TABLET, COATED ORAL
Qty: 10 | Refills: 0 | Status: COMPLETED | COMMUNITY
Start: 2021-03-23 | End: 2021-04-13

## 2021-04-15 LAB — BACTERIA UR CULT: NORMAL

## 2021-04-19 RX ORDER — NITROFURANTOIN (MONOHYDRATE/MACROCRYSTALS) 25; 75 MG/1; MG/1
100 CAPSULE ORAL
Qty: 10 | Refills: 0 | Status: COMPLETED | COMMUNITY
Start: 2021-04-10 | End: 2021-04-19

## 2021-04-21 ENCOUNTER — NON-APPOINTMENT (OUTPATIENT)
Age: 66
End: 2021-04-21

## 2021-04-22 DIAGNOSIS — N39.0 URINARY TRACT INFECTION, SITE NOT SPECIFIED: ICD-10-CM

## 2021-04-22 RX ORDER — AMOXICILLIN AND CLAVULANATE POTASSIUM 875; 125 MG/1; MG/1
875-125 TABLET, COATED ORAL TWICE DAILY
Qty: 20 | Refills: 0 | Status: COMPLETED | COMMUNITY
Start: 2021-04-22 | End: 2021-05-06

## 2021-04-27 LAB
APPEARANCE: CLEAR
BACTERIA UR CULT: ABNORMAL
BACTERIA: NEGATIVE
BILIRUBIN URINE: NEGATIVE
BLOOD URINE: ABNORMAL
COLOR: NORMAL
GLUCOSE QUALITATIVE U: NEGATIVE
HYALINE CASTS: 2 /LPF
KETONES URINE: NEGATIVE
LEUKOCYTE ESTERASE URINE: ABNORMAL
MICROSCOPIC-UA: NORMAL
NITRITE URINE: NEGATIVE
PH URINE: 6
PROTEIN URINE: NORMAL
RED BLOOD CELLS URINE: 10 /HPF
SPECIFIC GRAVITY URINE: 1.02
SQUAMOUS EPITHELIAL CELLS: 3 /HPF
UROBILINOGEN URINE: NORMAL
WHITE BLOOD CELLS URINE: 66 /HPF

## 2021-05-27 DIAGNOSIS — J20.9 ACUTE BRONCHITIS, UNSPECIFIED: ICD-10-CM

## 2021-05-27 RX ORDER — GUAIFENESIN AND DEXTROMETHORPHAN HYDROBROMIDE 600; 30 MG/1; MG/1
30-600 TABLET, EXTENDED RELEASE ORAL
Qty: 24 | Refills: 0 | Status: COMPLETED | COMMUNITY
Start: 2021-05-27 | End: 2021-06-08

## 2021-06-09 ENCOUNTER — APPOINTMENT (OUTPATIENT)
Dept: UROLOGY | Facility: CLINIC | Age: 66
End: 2021-06-09
Payer: COMMERCIAL

## 2021-06-09 ENCOUNTER — OUTPATIENT (OUTPATIENT)
Dept: OUTPATIENT SERVICES | Facility: HOSPITAL | Age: 66
LOS: 1 days | End: 2021-06-09
Payer: COMMERCIAL

## 2021-06-09 DIAGNOSIS — Z85.528 PERSONAL HISTORY OF OTHER MALIGNANT NEOPLASM OF KIDNEY: ICD-10-CM

## 2021-06-09 DIAGNOSIS — Z85.51 PERSONAL HISTORY OF MALIGNANT NEOPLASM OF BLADDER: ICD-10-CM

## 2021-06-09 DIAGNOSIS — Z98.89 OTHER SPECIFIED POSTPROCEDURAL STATES: Chronic | ICD-10-CM

## 2021-06-09 DIAGNOSIS — R35.0 FREQUENCY OF MICTURITION: ICD-10-CM

## 2021-06-09 DIAGNOSIS — M95.9 ACQUIRED DEFORMITY OF MUSCULOSKELETAL SYSTEM, UNSPECIFIED: Chronic | ICD-10-CM

## 2021-06-09 PROCEDURE — 52000 CYSTOURETHROSCOPY: CPT

## 2021-06-09 PROCEDURE — 88112 CYTOPATH CELL ENHANCE TECH: CPT | Mod: 26

## 2021-06-11 LAB — URINE CYTOLOGY: NORMAL

## 2021-06-15 ENCOUNTER — OUTPATIENT (OUTPATIENT)
Dept: OUTPATIENT SERVICES | Facility: HOSPITAL | Age: 66
LOS: 1 days | End: 2021-06-15
Payer: COMMERCIAL

## 2021-06-15 ENCOUNTER — APPOINTMENT (OUTPATIENT)
Dept: UROLOGY | Facility: CLINIC | Age: 66
End: 2021-06-15
Payer: COMMERCIAL

## 2021-06-15 DIAGNOSIS — R35.0 FREQUENCY OF MICTURITION: ICD-10-CM

## 2021-06-15 DIAGNOSIS — M95.9 ACQUIRED DEFORMITY OF MUSCULOSKELETAL SYSTEM, UNSPECIFIED: Chronic | ICD-10-CM

## 2021-06-15 DIAGNOSIS — Z85.528 PERSONAL HISTORY OF OTHER MALIGNANT NEOPLASM OF KIDNEY: ICD-10-CM

## 2021-06-15 DIAGNOSIS — Z98.89 OTHER SPECIFIED POSTPROCEDURAL STATES: Chronic | ICD-10-CM

## 2021-06-15 PROCEDURE — 76775 US EXAM ABDO BACK WALL LIM: CPT

## 2021-06-15 PROCEDURE — 76775 US EXAM ABDO BACK WALL LIM: CPT | Mod: 26

## 2021-07-04 ENCOUNTER — RX RENEWAL (OUTPATIENT)
Age: 66
End: 2021-07-04

## 2021-07-08 ENCOUNTER — NON-APPOINTMENT (OUTPATIENT)
Age: 66
End: 2021-07-08

## 2021-07-12 ENCOUNTER — NON-APPOINTMENT (OUTPATIENT)
Age: 66
End: 2021-07-12

## 2021-07-12 ENCOUNTER — APPOINTMENT (OUTPATIENT)
Dept: CARDIOLOGY | Facility: CLINIC | Age: 66
End: 2021-07-12
Payer: COMMERCIAL

## 2021-07-12 VITALS
DIASTOLIC BLOOD PRESSURE: 80 MMHG | SYSTOLIC BLOOD PRESSURE: 122 MMHG | WEIGHT: 222 LBS | HEIGHT: 73 IN | BODY MASS INDEX: 29.42 KG/M2 | HEART RATE: 93 BPM

## 2021-07-12 DIAGNOSIS — R60.0 LOCALIZED EDEMA: ICD-10-CM

## 2021-07-12 PROCEDURE — 99204 OFFICE O/P NEW MOD 45 MIN: CPT

## 2021-07-12 PROCEDURE — 93000 ELECTROCARDIOGRAM COMPLETE: CPT

## 2021-07-12 NOTE — HISTORY OF PRESENT ILLNESS
[FreeTextEntry1] : 66M with pAF, HTN P. Vera on Jakafi who presents for cardiac evaluation after Dr. Lamas retired \par \par Overall feeling well\par Notes frequent lightheadedness, feels like he may pass out and has to stop. No syncopal episodes\par No CP, no dyspnea\par Generally cannot tell when he is in Afib\par Had AFib post op 2013, short course of asa which was stopped. Subsequently noted to be in Afib in 2018, started on Xarelto. No bleeding complications from Xarelto \par Had been on cardizem first, then metoprolol was added due to poorly controlled HR\par \par Never smoker. Father had CAD later in life. 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 \par \par ECG: Sinus bradycardia at 49\par TTE 2018: 70-75%, mild MR\par NST: EF 55%, no ischemia\par \par Diltiazem 240mg, Lasix 20mg prn, Toprol 12.5mg, Rosuvastatin 5mg, Xarelto 20mg

## 2021-07-12 NOTE — DISCUSSION/SUMMARY
[FreeTextEntry1] : PAF: In AFib today. He notes frequent episodes of lightheadedness, unclear if medication related. Has demonstrated HR's in the 40s on prior ECG's\par \par Aim to get on 1 drug therapy\par Would cut down diltiazem to 120mg daily\par Continue Toprol 12.5mg daily, can increase as tolerated \par Continue Xarelto\par Consider ablation\par Repeat TTE \par \par HTN: Pretty well controlled, cont meds as dosed \par \par HLD: Markedly improved on Crestor. Cont meds, trend labs \par \par RV 1 months

## 2021-07-13 LAB
ALBUMIN SERPL ELPH-MCNC: 4.6 G/DL
ALP BLD-CCNC: 62 U/L
ALT SERPL-CCNC: 50 U/L
ANION GAP SERPL CALC-SCNC: 13 MMOL/L
AST SERPL-CCNC: 41 U/L
BASOPHILS # BLD AUTO: 0.02 K/UL
BASOPHILS NFR BLD AUTO: 0.6 %
BILIRUB SERPL-MCNC: 0.6 MG/DL
BUN SERPL-MCNC: 19 MG/DL
CALCIUM SERPL-MCNC: 9.9 MG/DL
CHLORIDE SERPL-SCNC: 104 MMOL/L
CHOLEST SERPL-MCNC: 161 MG/DL
CO2 SERPL-SCNC: 23 MMOL/L
CREAT SERPL-MCNC: 1.27 MG/DL
EOSINOPHIL # BLD AUTO: 0.02 K/UL
EOSINOPHIL NFR BLD AUTO: 0.6 %
ESTIMATED AVERAGE GLUCOSE: 123 MG/DL
GLUCOSE SERPL-MCNC: 111 MG/DL
HBA1C MFR BLD HPLC: 5.9 %
HCT VFR BLD CALC: 40.1 %
HDLC SERPL-MCNC: 55 MG/DL
HGB BLD-MCNC: 12.8 G/DL
IMM GRANULOCYTES NFR BLD AUTO: 0.6 %
LDLC SERPL CALC-MCNC: 91 MG/DL
LYMPHOCYTES # BLD AUTO: 0.66 K/UL
LYMPHOCYTES NFR BLD AUTO: 20.8 %
MAN DIFF?: NORMAL
MCHC RBC-ENTMCNC: 27.8 PG
MCHC RBC-ENTMCNC: 31.9 GM/DL
MCV RBC AUTO: 87 FL
MONOCYTES # BLD AUTO: 0.31 K/UL
MONOCYTES NFR BLD AUTO: 9.7 %
NEUTROPHILS # BLD AUTO: 2.15 K/UL
NEUTROPHILS NFR BLD AUTO: 67.7 %
NONHDLC SERPL-MCNC: 106 MG/DL
PLATELET # BLD AUTO: 273 K/UL
POTASSIUM SERPL-SCNC: 5 MMOL/L
PROT SERPL-MCNC: 6.7 G/DL
PSA SERPL-MCNC: 0.35 NG/ML
RBC # BLD: 4.61 M/UL
RBC # FLD: 16.3 %
SODIUM SERPL-SCNC: 140 MMOL/L
TRIGL SERPL-MCNC: 75 MG/DL
TSH SERPL-ACNC: 2.02 UIU/ML
WBC # FLD AUTO: 3.18 K/UL

## 2021-08-11 ENCOUNTER — APPOINTMENT (OUTPATIENT)
Dept: CARDIOLOGY | Facility: CLINIC | Age: 66
End: 2021-08-11
Payer: COMMERCIAL

## 2021-08-11 VITALS — HEART RATE: 71 BPM | DIASTOLIC BLOOD PRESSURE: 72 MMHG | SYSTOLIC BLOOD PRESSURE: 110 MMHG

## 2021-08-11 DIAGNOSIS — R51.9 HEADACHE, UNSPECIFIED: ICD-10-CM

## 2021-08-11 PROCEDURE — 99214 OFFICE O/P EST MOD 30 MIN: CPT

## 2021-08-11 NOTE — DISCUSSION/SUMMARY
[FreeTextEntry1] : He felt well with decreased dose of diltiazem but has subsequently felt very lightheaded with occasional headaches over the last 1-2 weeks.\par He feels well today other than sinus pressure\par \par Suspect his lightheadedness may correlate with his Afib symptoms. He is in SR today. His lightheadedness was present on last vist (while in AFib) and not present today\par Will increase diltiazem back to 240mg. If Afib is identified to be cause of symptoms, would more strongly consider ablation vs antiarrhythmics to maintain SR\par Attach 4 day ZIO to assess for AF burden, to press trigger when he develops symptoms of lightheadedness\par \par Continue Toprol 12.5mg daily- still aiming for single drug therapy \par Continue Xarelto\par Repeat TTE- pending later this week\par \par HTN: Pretty well controlled, cont meds as dosed \par \par HLD: Markedly improved on Crestor. Cont meds, trend labs \par \par RV 1 month

## 2021-08-13 ENCOUNTER — APPOINTMENT (OUTPATIENT)
Dept: CARDIOLOGY | Facility: CLINIC | Age: 66
End: 2021-08-13
Payer: COMMERCIAL

## 2021-08-13 ENCOUNTER — APPOINTMENT (OUTPATIENT)
Dept: INTERNAL MEDICINE | Facility: CLINIC | Age: 66
End: 2021-08-13
Payer: COMMERCIAL

## 2021-08-13 VITALS — DIASTOLIC BLOOD PRESSURE: 73 MMHG | SYSTOLIC BLOOD PRESSURE: 132 MMHG | HEART RATE: 56 BPM

## 2021-08-13 PROCEDURE — 99213 OFFICE O/P EST LOW 20 MIN: CPT

## 2021-08-13 PROCEDURE — 93306 TTE W/DOPPLER COMPLETE: CPT

## 2021-08-13 NOTE — HISTORY OF PRESENT ILLNESS
[FreeTextEntry1] : 66M with pAF, HTN P. Vera on Jakafi who presents for cardiac evaluation after Dr. Lamas retired \par \par Cardizem increased to 240mg 2 days ago\par Echo today within normal limits\par Feeling much better\par Echo showed he is in SR today\par \par HISTORY:\par \par Generally cannot tell when he is in Afib\par Had AFib post op 2013, short course of asa which was stopped. Subsequently noted to be in Afib in 2018, started on Xarelto. No bleeding complications from Xarelto \par Had been on Cardizem first, then metoprolol was added due to poorly controlled HR\par \par Never smoker. Father had CAD later in life. 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 \par \par ECG: Sinus bradycardia at 49\par TTE 2018: 70-75%, mild MR\par NST: EF 55%, no ischemia\par \par Diltiazem 240mg, Lasix 20mg prn, Toprol 12.5mg, Rosuvastatin 5mg, Xarelto 20mg

## 2021-08-13 NOTE — DISCUSSION/SUMMARY
[FreeTextEntry1] : Feeling much better today. SR per echo.\par Continue to suspect that his feeling unwell is related to when he is Afib\par Will aim to keep him in SR\par Cont cardizem 240mg, Toprol 12.5mg daily\par Currently wearing ZIO patch\par EP eval- possible ablation vs consideration of antiarrhythmics- Dr. Wilkinson \par \par Continue Xarelto\par \par HTN: Pretty well controlled, cont meds as dosed \par \par HLD: Markedly improved on Crestor. Cont meds, trend labs \par \par RV 6 weeks

## 2021-08-31 ENCOUNTER — NON-APPOINTMENT (OUTPATIENT)
Age: 66
End: 2021-08-31

## 2021-08-31 DIAGNOSIS — R42 DIZZINESS AND GIDDINESS: ICD-10-CM

## 2021-11-04 ENCOUNTER — NON-APPOINTMENT (OUTPATIENT)
Age: 66
End: 2021-11-04

## 2021-11-08 ENCOUNTER — NON-APPOINTMENT (OUTPATIENT)
Age: 66
End: 2021-11-08

## 2021-11-10 ENCOUNTER — APPOINTMENT (OUTPATIENT)
Dept: INTERNAL MEDICINE | Facility: CLINIC | Age: 66
End: 2021-11-10
Payer: COMMERCIAL

## 2021-11-10 VITALS
WEIGHT: 225 LBS | TEMPERATURE: 97.8 F | HEIGHT: 73 IN | HEART RATE: 58 BPM | BODY MASS INDEX: 29.82 KG/M2 | SYSTOLIC BLOOD PRESSURE: 100 MMHG | DIASTOLIC BLOOD PRESSURE: 66 MMHG | OXYGEN SATURATION: 95 %

## 2021-11-10 DIAGNOSIS — R73.03 PREDIABETES.: ICD-10-CM

## 2021-11-10 DIAGNOSIS — Z51.81 ENCOUNTER FOR THERAPEUTIC DRUG LVL MONITORING: ICD-10-CM

## 2021-11-10 PROCEDURE — 99214 OFFICE O/P EST MOD 30 MIN: CPT

## 2021-11-10 NOTE — PHYSICAL EXAM
[Normal Sclera/Conjunctiva] : normal sclera/conjunctiva [Normal Outer Ear/Nose] : the outer ears and nose were normal in appearance [Normal Oropharynx] : the oropharynx was normal [Supple] : supple [No Respiratory Distress] : no respiratory distress  [No Accessory Muscle Use] : no accessory muscle use [Normal Rate] : normal rate  [Regular Rhythm] : with a regular rhythm [Normal S1, S2] : normal S1 and S2 [No Edema] : there was no peripheral edema [Soft] : abdomen soft [Non Tender] : non-tender [No HSM] : no HSM [Coordination Grossly Intact] : coordination grossly intact [Normal Gait] : normal gait [Normal] : affect was normal and insight and judgment were intact [de-identified] : some rhonci at bases

## 2021-11-10 NOTE — HISTORY OF PRESENT ILLNESS
[FreeTextEntry8] : last  1 week  developed   sinus congestion and the a  bad cough. took a z pack and is using  mucinex.  yesterday  he felt a little sob  with  going up [address] stairs.he has   had no fever.  he feels like  he  has to expectorate but cannot  .  he  also   watches  carbs     and his wt is stable though  he gained earlier  he had a neg covid  recently and has  3 moderna shots

## 2021-11-11 LAB
ALBUMIN SERPL ELPH-MCNC: 4.9 G/DL
ALP BLD-CCNC: 73 U/L
ALT SERPL-CCNC: 42 U/L
ANION GAP SERPL CALC-SCNC: 12 MMOL/L
AST SERPL-CCNC: 41 U/L
BASOPHILS # BLD AUTO: 0.01 K/UL
BASOPHILS NFR BLD AUTO: 0.3 %
BILIRUB SERPL-MCNC: 0.4 MG/DL
BUN SERPL-MCNC: 19 MG/DL
CALCIUM SERPL-MCNC: 9.9 MG/DL
CHLORIDE SERPL-SCNC: 106 MMOL/L
CHOLEST SERPL-MCNC: 180 MG/DL
CO2 SERPL-SCNC: 22 MMOL/L
CREAT SERPL-MCNC: 1.23 MG/DL
EOSINOPHIL # BLD AUTO: 0.06 K/UL
EOSINOPHIL NFR BLD AUTO: 1.6 %
ESTIMATED AVERAGE GLUCOSE: 128 MG/DL
GLUCOSE SERPL-MCNC: 112 MG/DL
HBA1C MFR BLD HPLC: 6.1 %
HCT VFR BLD CALC: 41.3 %
HDLC SERPL-MCNC: 55 MG/DL
HGB BLD-MCNC: 13 G/DL
IMM GRANULOCYTES NFR BLD AUTO: 1.3 %
LDLC SERPL CALC-MCNC: 107 MG/DL
LYMPHOCYTES # BLD AUTO: 0.79 K/UL
LYMPHOCYTES NFR BLD AUTO: 20.6 %
MAN DIFF?: NORMAL
MCHC RBC-ENTMCNC: 27.8 PG
MCHC RBC-ENTMCNC: 31.5 GM/DL
MCV RBC AUTO: 88.2 FL
MONOCYTES # BLD AUTO: 0.37 K/UL
MONOCYTES NFR BLD AUTO: 9.6 %
NEUTROPHILS # BLD AUTO: 2.56 K/UL
NEUTROPHILS NFR BLD AUTO: 66.6 %
NONHDLC SERPL-MCNC: 125 MG/DL
PLATELET # BLD AUTO: 264 K/UL
POTASSIUM SERPL-SCNC: 5 MMOL/L
PROT SERPL-MCNC: 7.1 G/DL
RBC # BLD: 4.68 M/UL
RBC # FLD: 15.9 %
SODIUM SERPL-SCNC: 140 MMOL/L
TRIGL SERPL-MCNC: 89 MG/DL
WBC # FLD AUTO: 3.84 K/UL

## 2021-11-12 ENCOUNTER — NON-APPOINTMENT (OUTPATIENT)
Age: 66
End: 2021-11-12

## 2021-12-09 ENCOUNTER — APPOINTMENT (OUTPATIENT)
Dept: UROLOGY | Facility: CLINIC | Age: 66
End: 2021-12-09
Payer: COMMERCIAL

## 2021-12-09 ENCOUNTER — OUTPATIENT (OUTPATIENT)
Dept: OUTPATIENT SERVICES | Facility: HOSPITAL | Age: 66
LOS: 1 days | End: 2021-12-09
Payer: COMMERCIAL

## 2021-12-09 VITALS
BODY MASS INDEX: 29.82 KG/M2 | SYSTOLIC BLOOD PRESSURE: 109 MMHG | HEART RATE: 71 BPM | TEMPERATURE: 97.6 F | DIASTOLIC BLOOD PRESSURE: 71 MMHG | WEIGHT: 225 LBS | RESPIRATION RATE: 17 BRPM | HEIGHT: 73 IN

## 2021-12-09 DIAGNOSIS — Z98.89 OTHER SPECIFIED POSTPROCEDURAL STATES: Chronic | ICD-10-CM

## 2021-12-09 DIAGNOSIS — M95.9 ACQUIRED DEFORMITY OF MUSCULOSKELETAL SYSTEM, UNSPECIFIED: Chronic | ICD-10-CM

## 2021-12-09 DIAGNOSIS — R35.0 FREQUENCY OF MICTURITION: ICD-10-CM

## 2021-12-09 PROCEDURE — 52000 CYSTOURETHROSCOPY: CPT

## 2021-12-09 PROCEDURE — 76775 US EXAM ABDO BACK WALL LIM: CPT | Mod: 26

## 2021-12-09 PROCEDURE — 76775 US EXAM ABDO BACK WALL LIM: CPT

## 2021-12-10 LAB — URINE CYTOLOGY: NORMAL

## 2021-12-13 ENCOUNTER — APPOINTMENT (OUTPATIENT)
Dept: UROLOGY | Facility: CLINIC | Age: 66
End: 2021-12-13

## 2021-12-15 DIAGNOSIS — Z85.51 PERSONAL HISTORY OF MALIGNANT NEOPLASM OF BLADDER: ICD-10-CM

## 2021-12-15 DIAGNOSIS — Z85.528 PERSONAL HISTORY OF OTHER MALIGNANT NEOPLASM OF KIDNEY: ICD-10-CM

## 2022-01-06 ENCOUNTER — APPOINTMENT (OUTPATIENT)
Dept: UROLOGY | Facility: CLINIC | Age: 67
End: 2022-01-06

## 2022-01-14 ENCOUNTER — APPOINTMENT (OUTPATIENT)
Dept: UROLOGY | Facility: CLINIC | Age: 67
End: 2022-01-14
Payer: COMMERCIAL

## 2022-01-14 PROCEDURE — 99215 OFFICE O/P EST HI 40 MIN: CPT | Mod: 95

## 2022-01-14 NOTE — HISTORY OF PRESENT ILLNESS
[FreeTextEntry1] : 66 year old M w hxt1a right RCC s/p 2012 robotic R partial nephrectomy Dr Love\par hx of bladder ca s/p turbt, chemo instillation \par hx of Polycythemia Vera, afib on Xarelto \par hx of 2 UTIs last year\par BPH - on finasteride x 1 year, Tamsulosin 0.4 mg qhs  \par \par OAB - has tried multiple anticholingergics, myrbetriq \par \par OAB s/p botox 100u 2020,  \par \par DTF   [id number] hr\par nocturia 2-4\par urinary urgency  - when he drives he carries a urinal \par UUI  2x / week\par LAKESHIA  denies\par \par furosemide 20 mg - PRN \par \par coffee - 1-2 / day, usually decaf\par tea - snapple iced tea\par cola - denies\par smoking - never\par \par bowel movements\par \par Cystoscopy - Dr Love  Dec 2021 - trilobar enlargement\par \par UDS (5/2019)\par flow 14\par pdet 25\par capacity 302\par pvr 134\par \par He is an  - marketing research, [employer], [employer]\par

## 2022-01-14 NOTE — ASSESSMENT
[FreeTextEntry1] : Increase Tamsulosin to 0.8 mg qhs\par counseled re risks and falls\par \par consider cystoscopy in the future\par \par \par We discussed third line therapies for OAB, including tibial nerve stimulation, intravesical bladder Botox, and sacral neuromodulation.\par \par \par \par More\par ellura recommended over existing cranberry producted\par \par \par will call back with decision \par

## 2022-01-27 ENCOUNTER — APPOINTMENT (OUTPATIENT)
Dept: UROLOGY | Facility: CLINIC | Age: 67
End: 2022-01-27

## 2022-03-28 NOTE — H&P ADULT - ATTENDING COMMENTS
ORDER COVID TEST FOR 4/12 RFA   Patient seen and examined on 6/22/19, case discussed with Dr. Grande, agree with assessment and plan as above. This is a 64M with history of PV on Jakafi, pAFib on Xarelto, HTN, RCC s/p partial nephrectomy, and recently diagnosed Bladder cancer s/p TURBT (on 6/20) who presents to the hospital with acute on set of confusion. As per patient's wife, he had his procedure on 6/20 and after the procedure started to pass blood clots via his urethra and had an inability to completely void his bladder. He went back to his urologists office on 6/21 AM and there was noted to be retaining ~566cc of urine and had a oates placed and his bladder was irrigated with 500cc of sterile water. he was then sent home with the oates but soon after became very confused, was slurring his speech, and was therefore brought to the ED for an evaluation. On arrival to the ED he was noted to be hyponatremic to 117 and repeat BMP showed a decrease in his sodium to 114. He was then given 1L NS and repeat BMP after that showed his sodium increased to 122 with significant improvement in his mental status. Currently patient is back to his baseline mental status but states that he cannot fully remember the events that happened between Thursday night and Friday. He denies any other complaints at this time.   - Unclear cause of patient's hyponatremia though the suspicion is that this was an acute hyponatremia (possibly 2/2 some degree of SIADH in setting of urinary retention combined with bladder absorption of irrigation fluid) and improved with resolution of the retention/IV fluids, acute change in mental status was also likely 2/2 symptomatic hyponatremia and now is improving with improved sodium levels  - would hold off on further fluids for now, would obtain renal eval in AM to help guide management of his hyponatremia  - Patient was evaluated by MICU, not a MICU candidate at this time; urology consulted due to hematuria and determined to not need any urological procedures at this time and they recommended to continue with the oates for now  - Patient noted to have a significant drop in his H/H from baseline, given his hematuria with blood clots there is a concern that he might still be actively bleeding, currently no blood clots noted in his oates/bag but he is still having red-colored urine, would continue to hold his xarelto for now and monitor his H/H, if H/H stable/improving then would consider restarting xarelto (CHADSVASC 1 but patient does have PV), would likely need blood transfusion if H/H continues to fall  - Would need to check UA to r/o UTI  - Other management as per the assessment and plan above.

## 2022-04-11 PROBLEM — Z11.59 SCREENING FOR VIRAL DISEASE: Status: RESOLVED | Noted: 2020-05-26 | Resolved: 2020-12-07

## 2022-06-12 ENCOUNTER — RX RENEWAL (OUTPATIENT)
Age: 67
End: 2022-06-12

## 2022-06-13 ENCOUNTER — APPOINTMENT (OUTPATIENT)
Dept: UROLOGY | Facility: CLINIC | Age: 67
End: 2022-06-13
Payer: COMMERCIAL

## 2022-06-13 ENCOUNTER — OUTPATIENT (OUTPATIENT)
Dept: OUTPATIENT SERVICES | Facility: HOSPITAL | Age: 67
LOS: 1 days | End: 2022-06-13
Payer: COMMERCIAL

## 2022-06-13 VITALS — SYSTOLIC BLOOD PRESSURE: 124 MMHG | DIASTOLIC BLOOD PRESSURE: 67 MMHG | TEMPERATURE: 98 F | HEART RATE: 78 BPM

## 2022-06-13 DIAGNOSIS — M95.9 ACQUIRED DEFORMITY OF MUSCULOSKELETAL SYSTEM, UNSPECIFIED: Chronic | ICD-10-CM

## 2022-06-13 DIAGNOSIS — Z98.89 OTHER SPECIFIED POSTPROCEDURAL STATES: Chronic | ICD-10-CM

## 2022-06-13 DIAGNOSIS — R35.0 FREQUENCY OF MICTURITION: ICD-10-CM

## 2022-06-13 DIAGNOSIS — Z85.51 PERSONAL HISTORY OF MALIGNANT NEOPLASM OF BLADDER: ICD-10-CM

## 2022-06-13 PROCEDURE — 52000 CYSTOURETHROSCOPY: CPT

## 2022-06-14 LAB — URINE CYTOLOGY: NORMAL

## 2022-06-23 ENCOUNTER — APPOINTMENT (OUTPATIENT)
Dept: CARDIOLOGY | Facility: CLINIC | Age: 67
End: 2022-06-23
Payer: COMMERCIAL

## 2022-06-23 ENCOUNTER — NON-APPOINTMENT (OUTPATIENT)
Age: 67
End: 2022-06-23

## 2022-06-23 VITALS
HEART RATE: 61 BPM | DIASTOLIC BLOOD PRESSURE: 69 MMHG | BODY MASS INDEX: 30.22 KG/M2 | HEIGHT: 73 IN | OXYGEN SATURATION: 97 % | SYSTOLIC BLOOD PRESSURE: 141 MMHG | WEIGHT: 228 LBS | TEMPERATURE: 97.8 F

## 2022-06-23 VITALS — SYSTOLIC BLOOD PRESSURE: 105 MMHG | DIASTOLIC BLOOD PRESSURE: 60 MMHG

## 2022-06-23 DIAGNOSIS — I47.2 VENTRICULAR TACHYCARDIA: ICD-10-CM

## 2022-06-23 PROCEDURE — 99214 OFFICE O/P EST MOD 30 MIN: CPT

## 2022-06-23 PROCEDURE — 93000 ELECTROCARDIOGRAM COMPLETE: CPT

## 2022-06-23 NOTE — DISCUSSION/SUMMARY
[FreeTextEntry1] : Feeling unwell, some days better than other\par \par Suspect symptoms are more anemia related than cardiac\par Normal LV function\par Very mild CAD\par ?medication related or related to when he is in AFib as possible cardiac etiologies \par \par Will repeat TTE\par Drop cardizem to 120mg as BP is running low and may be contributing to fatigue\par Cont Toprol 12.5mg daily \par EP eval- possible ablation to allow \par \par Afib: Cont Xarelto, Toprol, drop cardizem dose. EP eval\par \par HTN: Too low, drop cardizem, otherwise continue meds\par \par HLD: Markedly improved on Crestor. Cont meds, trend labs \par \par Mild CAD: Cont statin, on Xarelto \par \par RV 3M

## 2022-06-23 NOTE — HISTORY OF PRESENT ILLNESS
[FreeTextEntry1] : 67M with pAF, HTN P. Vera on Jakafi who presents for cardiac follow up\par \par Last seen in August 2021\par ZIO notable for 16% AF burden, Cardizem increased\par CTA notable for mild plaque\par He feels lightheaded, fatigue, weak- sensation of going to faint but no actual syncope\par His Hb has been running low as of late, in the 10's- stays on Jakafi \par On Xarelto, no bleeding issues\par \par HISTORY:\par \par Generally cannot tell when he is in Afib\par Had AFib post op 2013, short course of asa which was stopped. Subsequently noted to be in Afib in 2018, started on Xarelto. No bleeding complications from Xarelto \par Had been on Cardizem first, then metoprolol was added due to poorly controlled HR\par \par Never smoker. Father had CAD later in life. 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 \par \par ECG: Sinus bradycardia at 49\par TTE 2021: 60-65%, mild MAC, mild MR\par NST: EF 55%, no ischemia\par ZIO: 16% AF burden, NSVT\par CTA: CAC score 21, 20% LCx, short mid LAD bridge, small pericardial effusion \par \par Xarelto 20mg \par Rosuvastatin 5mg\par \par Toprol 12.5mg\par Diltiazem 240mg\par Lasix 20mg prn

## 2022-06-30 ENCOUNTER — RX RENEWAL (OUTPATIENT)
Age: 67
End: 2022-06-30

## 2022-07-26 ENCOUNTER — APPOINTMENT (OUTPATIENT)
Dept: INTERNAL MEDICINE | Facility: CLINIC | Age: 67
End: 2022-07-26

## 2022-07-26 PROCEDURE — 93306 TTE W/DOPPLER COMPLETE: CPT

## 2022-08-10 NOTE — H&P PST ADULT - PRO INTERPRETER NEED 2
ANNUAL EXAM AGES 40-64 POST HYSTERECTOMY      Laly Vásquez is a ,  65 y.o. female   No LMP recorded. Patient has had a hysterectomy. She presents for her annual checkup. She is having no significant problems. Hormonal status:  She reports no perimenstrual type symptoms. She is not having vasomotor symptoms. The patient is not using any ERT. [place] Sexual history:  She  reports that she is not currently sexually active and has had partner(s) who are male. She reports using the following method of birth control/protection: None. Medical conditions:  Since her last annual GYN exam about one year ago, she has not the following changes in her health history: none. Pap and Mammogram History:  Her most recent Pap smear was normal, obtained 2021. The patient had a recent mammogram on 2022 needs additional imaging right breast mass. Gyn Flowsheet:  No flowsheet data found. Breast Cancer History negative    Osteoporosis History:  Family history does not include a first or second degree relative with osteopenia or osteoporosis. A bone density scan was not obtained.     Medical History:  Patient Active Problem List   Diagnosis Code    Chest tightness R07.89    Unspecified essential hypertension I10    Morbid obesity with BMI of 45.0-49.9, adult ([address] E66.01, Z68.42    Edema R60.9    Sleep apnea G47.30    Malignant neoplasm of upper-outer quadrant of right female breast ([address] C50.411    Family history of ovarian cancer Z80.41     Past Medical History:   Diagnosis Date    Arthritis 2021    BRCA negative     Cancer [facility]) 2022    Breast    Chest tightness     Edema     Family history of ovarian cancer 2022    Mom   BRCA neg  Q6mo US    Hypertension     Menorrhagia     Morbid obesity with BMI of 45.0-49.9, adult ([address]     Obesity     Pelvic adhesions     Severe dysmenorrhea     Sleep apnea     Trichomonal vaginitis 2006    Unspecified essential hypertension Uterine fibroid      Past Surgical History:   Procedure Laterality Date    HX COLONOSCOPY      WNL Repeat     HX DILATION AND CURETTAGE  1982        HX KNEE ARTHROSCOPY Right 2007    HX PELVIC LAPAROSCOPY  2010    Ovarian Cystectomy KENRICK Salpingectomy    HX TOTAL LAPAROSCOPIC HYSTERECTOMY  2004    Menorrhagia Fibroids Dysmenorrhea    HX TUBAL LIGATION Bilateral     [place] LAP,TUBAL CAUTERY  1995     OB History    Para Term  AB Living   3 2 2 0 1 2   SAB IAB Ectopic Molar Multiple Live Births   0 1 0 0 0 2      # Outcome Date GA Lbr Santiago/2nd Weight Sex Delivery Anes PTL Lv   3 Term 95 40w0d  7 lb 1 oz (3.204 kg) M Vag-Spont   FELICITA   2 Term 93 40w0d  9 lb 6 oz (4.252 kg) M Vag-Spont   FELICITA   1 IAB              Social History     Socioeconomic History    Marital status: SINGLE   Occupational History    Occupation: CNA   Tobacco Use    Smoking status: Never    Smokeless tobacco: Never   Vaping Use    Vaping Use: Never used   Substance and Sexual Activity    Alcohol use: Yes     Alcohol/week: 1.0 standard drink     Types: 1 Drinks containing 0.5 oz of alcohol per week    Drug use: Never    Sexual activity: Not Currently     Partners: Male     Birth control/protection: None      Family History   Problem Relation Age of Onset    Ovarian Cancer Mother 54    Hypertension Father     Kidney Disease Brother     Heart Attack Brother     Hypertension Brother     Heart Disease Brother     Stroke Maternal Aunt     Diabetes Maternal Aunt     Breast Cancer Maternal Aunt 68    Breast Cancer Maternal Aunt     Breast Cancer Maternal Aunt      Current Outpatient Medications on File Prior to Visit   Medication Sig Dispense Refill    ergocalciferol (ERGOCALCIFEROL) 1,250 mcg (50,000 unit) capsule       meloxicam (MOBIC) 7.5 mg tablet Take 7.5 mg by mouth in the morning.       metFORMIN (GLUCOPHAGE) 500 mg tablet       potassium chloride SR (KLOR-CON 10) 10 mEq tablet triamterene-hydroCHLOROthiazide (MAXZIDE) 75-50 mg per tablet       MULTIVITAMIN (MULTIPLE VITAMINS DAILY PO) Take  by mouth daily. triamterene-hydrochlorothiazide (MAXZIDE) 37.5-25 mg per tablet Take  by mouth daily. No current facility-administered medications on file prior to visit. No Known Allergies    Review of Systems:  History obtained from the patient-negative for:  Constitutional: weight loss, fever, night sweats  HEENT: hearing loss, earache, congestion, snoring, sorethroat  CV: chest pain, palpitations, edema  Resp: cough, shortness of breath, wheezing  Breast: breast lumps, nipple discharge, galactorrhea  GI: change in bowel habits, abdominal pain, black or bloody stools  : frequency, dysuria, hematuria, vaginal discharge  MSK: back pain, joint pain, muscle pain  Skin: itching, rash, hives  Neuro: dizziness, headache, confusion, weakness  Psych: anxiety, depression, change in mood  Heme/lymph: bleeding, bruising, pallor    Physical Exam  Visit Vitals  /88   Ht 6' (1.829 m)   Wt 345 lb 9.6 oz (156.8 kg)   BMI 46.87 kg/m²       Constitutional  Appearance: well-nourished, well developed, alert, in no acute distress    HENT  Head and Face: appears normal    Neck  Inspection/Palpation: normal appearance, no masses or tenderness  Lymph Nodes: no lymphadenopathy present  Thyroid: gland size normal, nontender, no nodules or masses present on palpation    Chest  Respiratory Effort: breathing unlabored  Auscultation: normal breath sounds    Cardiovascular  Heart:   Auscultation: regular rate and rhythm without murmur    Breasts  Inspection of Breasts: breasts symmetrical, no skin changes, no discharge present, nipple appearance normal, no skin retraction present  Palpation of Breasts and Axillae: no masses present on palpation, no breast tenderness  Axillary Lymph Nodes: no lymphadenopathy present    Gastrointestinal  Abdominal Examination: abdomen non-tender to palpation, normal bowel sounds, no masses present  Liver and spleen: no hepatomegaly present, spleen not palpable  Hernias: no hernias identified    Genitourinary  External Genitalia: normal appearance for age, no discharge present, no tenderness present, no inflammatory lesions present, no masses present, no atrophy present  Vagina: normal vaginal vault without central or paravaginal defects, no discharge present, no inflammatory lesions present, no masses present  Bladder: non-tender to palpation  Urethra: appears normal  Cervix: absent  Uterus: absent  Adnexa: no adnexal tenderness present, no adnexal masses present  Perineum: perineum within normal limits, no evidence of trauma, no rashes or skin lesions present  Anus: anus within normal limits, no hemorrhoids present  Inguinal Lymph Nodes: no lymphadenopathy present    Skin  General Inspection: no rash, no lesions identified    Neurologic/Psychiatric  Mental Status:  Orientation: grossly oriented to person, place and time  Mood and Affect: mood normal, affect appropriate    Labs:  Recent Results (from the past 12 hour(s))   AMB POC URINALYSIS DIP STICK MANUAL W/O MICRO    Collection Time: 08/11/22 10:57 AM   Result Value Ref Range    Color (UA POC) Yellow     Clarity (UA POC) Clear     Glucose (UA POC) Trace Negative    Bilirubin (UA POC) Negative Negative    Ketones (UA POC) Negative Negative    Specific gravity (UA POC) 1.020 1.001 - 1.035    Blood (UA POC) 1+ Negative    pH (UA POC) 7 4.6 - 8.0    Protein (UA POC) Trace Negative    Urobilinogen (UA POC) normal 0.2 - 1    Nitrites (UA POC) Positive Negative    Leukocyte esterase (UA POC) Negative Negative       Assessment:    ICD-10-CM ICD-9-CM    1. Routine gynecological examination  Z01.419 V72.31 PAP IG, APTIMA HPV AND RFX 16/18,45 ([id number])      2. Malignant neoplasm of upper-outer quadrant of right breast in female, estrogen receptor positive ([facility]  C50.411 174.4     Z17.0 V86.0       3.  Osteoporosis screening  Z13.820 V82.81 DEXA BONE DENSITY STUDY AXIAL      4. Family history of ovarian cancer  Z80.41 V16.41       5. OAB (overactive bladder)  N32.81 596.51 AMB POC URINALYSIS DIP STICK MANUAL W/O MICRO        Poss UTI  Plan:  Counseled re: diet, exercise, healthy lifestyle  Schedule DEXA  US for h/o ovarian ca in family  Urine Culture  Return in about 1 year (around 8/11/2023) for Annual, [address] and [place]. [place]

## 2022-09-06 ENCOUNTER — RX RENEWAL (OUTPATIENT)
Age: 67
End: 2022-09-06

## 2022-09-12 ENCOUNTER — RX RENEWAL (OUTPATIENT)
Age: 67
End: 2022-09-12

## 2022-09-16 ENCOUNTER — APPOINTMENT (OUTPATIENT)
Dept: ELECTROPHYSIOLOGY | Facility: CLINIC | Age: 67
End: 2022-09-16

## 2022-09-16 NOTE — REASON FOR VISIT
[Arrhythmia/ECG Abnorrmalities] : arrhythmia/ECG abnormalities [FreeTextEntry3] : Virgilio Justice MD

## 2022-09-16 NOTE — CARDIOLOGY SUMMARY
[de-identified] : EF 55%, no ische [de-identified] : 7/26/2022: normal LV function\par \par 2021: 60-65%, mild MAC, mild MR\par  [de-identified] : CTA: CAC score 21, 20% LCx, short mid LAD bridge, small pericardial effusion

## 2022-09-16 NOTE — HISTORY OF PRESENT ILLNESS
[FreeTextEntry1] : Prasanna Edwards is a 68y/o man with Hx of HTN, HLD, BPH, and paroxysmal afib, on Xarelto, who presents today for initial evaluation. Follows with Cardiologist with complaints of feeling lightheaded and tired. Has had afib since 2018, on Xarelto. In past, believed to be asymptomatic of episodes. Underwent Holter back in 2021 which revealed 16% afib burden. No recorded correlating symptoms. Has been maintained on  diltiazem and metoprolol for rate control management. Diltiazem recently decreased due to hypotension, thought to be correlating to symptoms of fatigue.

## 2022-09-28 NOTE — ASU PREOP CHECKLIST - TYPE OF SOLUTION
[facility]  [address] [facility] [address]  [place] [address]  Phone:[phone]  Fax: [phone]   Follow up visit     Patient Name: Dorys Rich  : 2002  MRN:  [id number]  Today: 2022   LOV: 2022  chief complaint: Headache    PCP: Tonny Busch MD.    Assessment:   Dorys Rich is a 19 y.o. right handed female with a PMHx of: anxiety/depression, ADHD, ASD with speech/cognitive changes (?), and family hx of IIH  whom presents with her mother in follow up for HA. KELSEY chronic, lasting on a few hours (no longer >4 hours can't call it migraine), better since starting gabapentin.        We discussed imaging studies today, no apparent change or problematic findings as mentioned by the radiologist.       Review:    ICD-10-CM ICD-9-CM   1. Cervicogenic headache  G44.86 784.0             Plan:        For HA Prevention:  Increase gabapentin from 100 mg q2h before bed to 200 mg Q2h before bed     For HA :   maxalt refilled     To break up Headaches:  Can consider nerve blocks in future       Other:  Follow up with immunology /rheum/other specialists              All test results as well as any necessary instructions were reviewed and discussed with patient.    Review:  Orders Placed This Encounter    gabapentin (NEURONTIN) 100 MG capsule    rizatriptan (MAXALT-MLT) 10 MG disintegrating tablet             Patient to return to PCP/other specialists for all other problems  Patient to continue on all medications as Rx'd  Full office note available online   RTO- 3-4 months, mother will use portal to schedule follow up   The patient indicates understanding of these issues and agrees to the plan.    HPI:   Dorys Rich is a 19 y.o.right handed, female with a PMHx of: anxiety/depression, ADHD, ASD with speech/cognitive changes (?), and family hx of IIH  whom presents with her mother in follow up for HA    Mother provides components  "of KELSEY, Chelsey contributes.     At last visit: zanaflex stopped, gabapentin started, and maxalt available for bad HA. MRI brain and MRA head were both normal (9/21/2022).   HA still daily, but only for a few hours and less intense.  "She has felt the best on the gabapentin"   Gabapentin makes her a bit tired, goes right to bed   Still working with multiple providers for concerns about possible lupus and will be seeing dysautonomia specialist.   No other concerns, only mild HA in office   HA can be "all over"     From previous visits   Mother provides much of Hx.   At last visit: tapered off zonegran, started zanaflex (not interested in gabapentin), and has maxalt.   Saw immunologist, possible lupus (?) and rheumatologist ---"we can't figure out whats going on, she has more schedule appts coming up"   Has some polyps in her sinuses, will be getting allergy treatments in near future  Getting HA daily ---all over whole head   Pain seems to occur by mid day and seems to last until sleep  Muscle relaxant helps reduce severity but not stop the HA., mother mentions "if she misses the dose, she awakens and feels off and has a HA"   Sleeping well   Maxalt does work   No new weakness or vision changes  Per mom: "she just seems worse"        From last visit: started on topamax, then switched to zonegran d/t appetite suppression. Still having HA daily.   Mother provides most of Hx, patient supplements  At first states daily HA, comes/goes along frontalis, then mentions, HA occurring in back of neck, radiating forward and to the back of the eyes  Mother states "its hard to know when she has a HA and specifics as she doesn't tell me."  Seems despite switching to zonegran, still having KELSEY, Chelsey mentions "Food taste funny" and she is losing weight.   Mother also mentions she is having trouble sleeping, when historically "she was a good sleeper."  Has seen immunology, per mother, is doing "further work up."   HA 3-5/10 pain  Maxalt " "is helpful, if HA caught early  Chelsey states she had HA this morning and seems to have gone away 5-6 mins before the appt  No trigger identified  No new weakness/no new sensory changes  No falls.       From previous visit:     Patient provides most of her own hx, mother supplements at times     HA HPI:  Start:HA for a little while, unclear when they officially started (historically 1 HD a week, mother mentions "she may have more, but she never tells me if she has a HA until its too late, her siblings both have HA/IIH and one has abdominal migraine--all treated with elavil), however most noticeable on Good Friday with a concurrent fever and nausea (hence the hospitalization as above)   History of trauma (no), History of CNS infection (no), History of Stroke (no)  Location:forehead  However could radiate to the whole   Severity: range: 2-8/10  Duration:lasting all day long   Frequency:daily HA for the past couple weeks, historically "maybe" 1 HD a week (4HD/30 HD in a month)    Associated factors (bolded positive) WITH HA ( or migraine): Nausea, vomiting, photophobia, phonophobia, blurry vision (like dots/pixelation) tinnitus, scalp pain, vision loss, diplopia, scintillations, eye pain, jaw pain, weakness?    Tried:fioricet, OTC   Triggers (in bold): stress, lack of sleep, too much caffeine, too little caffeine, weather change, seasonal change, strong odours, bright lights, sunlight, food, mother states she has immune deficiency and is pending appt with immunologist in near future (recently had mono a few months ago).      Currently having a HA?:yes, 3/10  Positives in bold: Hx of Kidney Stones, asthma, GI bleed, osteoporosis, CAD/MI, CVA/TIA, DM  <---denies  Imaging on file: CT head 2022-NML, MRI and MRA head 9/21/2022 ---both normal   Therapies tried in past: (failures to be marked, if known---why did it fail?)  wellbutrin  fioriciet  catapress  zoloft  Flexeril  toradol  topamax  zonegran   zanaflex    maxalt " "  Zonegran  zanaflex             From Dr. Cedillo's assessment/plan (4/30/2022):  "  * Headache  Nonfocal headache, in context of febrile illness  - do not suspect viral / aseptic meningitis given clinical exam and CSF findings  - do not recommend antimicrobial coverage for meningitis  - possible migrainous headache, CSF pressure related (improved following LP, family hx IIH) or secondary to other systemic process related to her fevers     For now would continue PRN toradol, advised she take one now to stay ahead of pain. Remain well hydrated, maintain bedrest until this evening. Will follow.  Call if any return of headache      Fever of unknown origin  Workup in progress per primary service  - duration approx 2 weeks  - normal CBC, BMP, LFTs and bili, UA  - negative respiratory viral panel and blood cultures to date  - CSF benign; no indication of meningitis, viral or otherwise     con't workup of FUO to assess for infection, malignancy, rheumatological illness per primary team and ID   ":    Medication Reconciliation:   Current Outpatient Medications   Medication Sig Dispense Refill    buPROPion (WELLBUTRIN XL) 150 MG TB24 tablet Take 150 mg by mouth every morning.      cloNIDine (CATAPRES) 0.1 MG tablet Take 0.1 mg by mouth 2 (two) times daily as needed.      diazePAM (VALIUM) 5 MG tablet 1 pill 4 hours before MRI, second pill 30 mins before, no driving/secure a ride 3 tablet 0    gabapentin (NEURONTIN) 100 MG capsule 1 pill 2 hours before bed every night 30 capsule 6    hydrOXYzine pamoate (VISTARIL) 25 MG Cap 1 pill BID for 2 days, rest left over q8h PRN headache, no driving/causes sedatrion 15 capsule 0    levocetirizine (XYZAL) 5 MG tablet Take 5 mg by mouth every evening.      methylphenidate HCl 54 MG CR tablet Take 54 mg by mouth every morning.      pantoprazole (PROTONIX) 40 MG tablet Take 40 mg by mouth 2 (two) times daily.      pseudoephedrine-guaiFENesin  mg (MUCINEX D)  mg per tablet Take " 1 tablet by mouth every 12 (twelve) hours.      rizatriptan (MAXALT-MLT) 10 MG disintegrating tablet 1 melt at onset headache, second melt 2 hours later if needed, no more than 2 melts day/ 3days use in week 12 tablet 4    sertraline (ZOLOFT) 100 MG tablet Take 100 mg by mouth once daily.      tiZANidine (ZANAFLEX) 2 MG tablet 1-2 pills approx 1-2 hours before bed, as needed for muscle pain and headaches 60 tablet 5    vitamin D (VITAMIN D3) 1000 units Tab Take 1,000 Units by mouth once daily.       No current facility-administered medications for this visit.     Review of patient's allergies indicates:   Allergen Reactions    Midazolam      Other reaction(s): aggression    Reglan [metoclopramide]        PMHx:  Past Medical History:   Diagnosis Date    Anxiety     Attention deficit     Autism disorder     expressive language and cognitive desorder , like a 11 yo;     Depression     Headache      Past Surgical History:   Procedure Laterality Date    ADENOIDECTOMY      CHOLECYSTECTOMY      LAPAROSCOPIC CHOLECYSTECTOMY N/A 12/27/2021    Procedure: CHOLECYSTECTOMY-LAPAROSCOPIC;  Surgeon: Eb Deleon MD;  Location: [facility];  Service: General;  Laterality: N/A;    MYRINGOTOMY W/ TUBES      TONSILLECTOMY         Fhx:  Family History   Problem Relation Age of Onset    Depression Mother     Anxiety disorder Mother     Psoriasis Mother         psoriatic arthritis    Depression Father     Anxiety disorder Father     Juvenile idiopathic arthritis Father     Depression Sister     Anxiety disorder Sister     Hypertension Sister     Migraines Sister     Hypertension Brother     Psoriasis Son     Osteoarthritis Neg Hx     Inflammatory bowel disease Neg Hx        Shx:   Social History     Socioeconomic History    Marital status: Single   Tobacco Use    Smoking status: Never    Smokeless tobacco: Never   Substance and Sexual Activity    Alcohol use: Not Currently    Drug use: Not Currently    Sexual activity: Not Currently            Labs:   Lab Results   Component Value Date    WBC 7.24 06/30/2022    HGB 13.3 06/30/2022    HCT 38.5 06/30/2022    MCV 84 06/30/2022     06/30/2022     CMP  Sodium   Date Value Ref Range Status   09/17/2022 142 136 - 145 mmol/L Final     Potassium   Date Value Ref Range Status   09/17/2022 4.6 3.5 - 5.1 mmol/L Final     Chloride   Date Value Ref Range Status   09/17/2022 102 95 - 110 mmol/L Final     CO2   Date Value Ref Range Status   09/17/2022 29 22 - 31 mmol/L Final     Glucose   Date Value Ref Range Status   09/17/2022 92 70 - 110 mg/dL Final     Comment:     The ADA recommends the following guidelines for fasting glucose:    Normal:       less than 100 mg/dL    Prediabetes:  100 mg/dL to 125 mg/dL    Diabetes:     126 mg/dL or higher       BUN   Date Value Ref Range Status   09/17/2022 7 7 - 18 mg/dL Final     Creatinine   Date Value Ref Range Status   09/17/2022 0.66 0.50 - 1.40 mg/dL Final     Calcium   Date Value Ref Range Status   09/17/2022 10.1 8.4 - 10.2 mg/dL Final     Total Protein   Date Value Ref Range Status   09/17/2022 7.7 6.0 - 8.4 g/dL Final     Albumin   Date Value Ref Range Status   09/17/2022 5.1 (H) 3.2 - 4.7 g/dL Final     Total Bilirubin   Date Value Ref Range Status   09/17/2022 0.4 0.2 - 1.3 mg/dL Final     Alkaline Phosphatase   Date Value Ref Range Status   09/17/2022 66 38 - 145 U/L Final     AST   Date Value Ref Range Status   09/17/2022 24 14 - 36 U/L Final     ALT   Date Value Ref Range Status   09/17/2022 17 0 - 35 U/L Final     Anion Gap   Date Value Ref Range Status   09/17/2022 11 8 - 16 mmol/L Final     eGFR if    Date Value Ref Range Status   06/30/2022 >60 >60 mL/min/1.73 m^2 Final     eGFR if non    Date Value Ref Range Status   06/30/2022 >60 >60 mL/min/1.73 m^2 Final     Comment:     Calculation used to obtain the estimated glomerular filtration  rate (eGFR) is the CKD-EPI equation.           Latest Reference Range & Units  12/15/21 18:23 12/15/21 20:38 12/24/21 10:18 04/25/22 19:47 04/29/22 15:52 04/30/22 12:40   Influenza A, Molecular Negative  Negative   Negative     Influenza B, Molecular Negative  Negative   Negative     RSV Ag by Molecular Method Negative     Negative     Monospot Negative       Negative   Adeno Test Not Detected   Not Detected   Not Detected    Chlamydia pneumoniae Not Detected   Not Detected [1]   Not Detected [2]    Coronavirus (229E, HKU1, NL64, OC43), Common Cold Virus Not Detected   Not Detected [3]   Not Detected [4]    Human Metapneumovirus Not Detected   Not Detected   Not Detected    Human Rhinovirus/Enterovirus Not Detected   Not Detected [5]   Not Detected [6]    Influenza A - G5R0-16 Not Detected   Not Detected   Not Detected    Influenza B Not Detected   Not Detected   Not Detected    Mycoplasma pneumoniae Not Detected   Not Detected [7]   Not Detected [8]    Parainfluenza Virus 1 Not Detected   Not Detected   Not Detected    Parainfluenza Virus 2 Not Detected   Not Detected   Not Detected    Parainfluenza Virus 3 Not Detected   Not Detected   Not Detected    Parainfluenza Virus 4 Not Detected   Not Detected   Not Detected    Respiratory Infection Panel Source   NP swab   NP swab    Respiratory Syncytial VirusVirus (RSV) A Not Detected   Not Detected   Not Detected    RVP - Influenza A Not Detected   Not Detected   Not Detected    RVP - Influenza A, subtype H1 Not Detected   Not Detected   Not Detected    RVP - Influenza A, subtype H3 Not Detected   Not Detected [9]   Not Detected [10]    RVP - Respiratory Synctial Virus (RSV) B Not Detected   Not Detected   Not Detected    SARS-CoV2 (COVID-19) Qualitative PCR Not Detected  Negative [11] Not Detected Not Detected [12] Negative [13] Not Detected    [1] Due to the small number of positive samples collected for    Chlamydia pneumoniae during the prospective and retrospective   clinical studies, performance characteristics for Chlamydia   pneumoniae  were established primarily with contrived clinical   specimens. Performance characteristics for Influenza A H1 were    established using contrived clinical specimens only.      [2] Due to the small number of positive samples collected for    Chlamydia pneumoniae during the prospective and retrospective   clinical studies, performance characteristics for Chlamydia   pneumoniae were established primarily with contrived clinical   specimens. Performance characteristics for Influenza A H1 were    established using contrived clinical specimens only.      [3] Clinical evaluation indicates a lower sensitivity for the   detection of coronavirus OC43. If infection with coronavirus   OC43 is suspected, negative samples should be confirmed using   an alternative method.      At high titers of SARS-CoV-2, cross-reactivity with SARS-CoV-1 was    observed with the ePlez RP2 panel.     [4] Clinical evaluation indicates a lower sensitivity for the   detection of coronavirus OC43. If infection with coronavirus   OC43 is suspected, negative samples should be confirmed using   an alternative method.      At high titers of SARS-CoV-2, cross-reactivity with SARS-CoV-1 was    observed with the ePlez RP2 panel.     [5] Due to the genetic similarity between human rhinovirus/enterovirus   and poliovirus, the ePlex RP2 Panel cannot differentiate them.   If a poliovirus infection is suspected, an ePlex RP2 human   rhinovirus/enterovirus result of Detected should be confirmed   using an alternate method (e.g. cell culture).      Due to the genetic similarity between human rhinovirus   and enterovirus, this test cannot reliably differentiate   them. An ePlex RP2 Panel Rhinovirus/Enterovirus positive   result should be followed up using an alternate method   (e.g. cell culture or sequence analysis) if differentiation   between the viruses is required.     [6] Due to the genetic similarity between human rhinovirus/enterovirus   and poliovirus,  the ePlex RP2 Panel cannot differentiate them.   If a poliovirus infection is suspected, an ePlex RP2 human   rhinovirus/enterovirus result of Detected should be confirmed   using an alternate method (e.g. cell culture).      Due to the genetic similarity between human rhinovirus   and enterovirus, this test cannot reliably differentiate   them. An ePlex RP2 Panel Rhinovirus/Enterovirus positive   result should be followed up using an alternate method   (e.g. cell culture or sequence analysis) if differentiation   between the viruses is required.     [7] The Respiratory Infection Panel is performed on the    [employer] ePlex instrument which automates all aspects of    nucleic acid testing including magnetic solid phase extraction,   reverse transcription, amplification, and detection.    eSensor technology is based on the principles of competitive    DNA hybridization and electrochemical detection, which is highly   specific and is not based on fluorescent or optical detection.      If four or more organisms are detected in a sample, retesting   is recommended to confirm polymicrobial result.       There is a risk of false positive or false negative values   resulting from improperly collected, transported, or handled   samples.      A result of No Targets Detected on the ePlex RP2 Panel should not   be used as the sole basis for diagnosis, treatment or other   patient management decisions.      The performance of this test has not been established for patients   without signs and symptoms of respiratory infection.       This test is only for use under the Food and Drug Administration's    Emergency Use Authorization (EUA). The SARS-CoV-2 assay on the    RP2 panel has been validated but FDA's independent review    of the validation is pending.     [8] The Respiratory Infection Panel is performed on the    T[employer]lex instrument which automates all aspects of    nucleic acid testing including magnetic solid phase  extraction,   reverse transcription, amplification, and detection.    eSensor technology is based on the principles of competitive    DNA hybridization and electrochemical detection, which is highly   specific and is not based on fluorescent or optical detection.      If four or more organisms are detected in a sample, retesting   is recommended to confirm polymicrobial result.       There is a risk of false positive or false negative values   resulting from improperly collected, transported, or handled   samples.      A result of No Targets Detected on the ePlex RP2 Panel should not   be used as the sole basis for diagnosis, treatment or other   patient management decisions.      The performance of this test has not been established for patients   without signs and symptoms of respiratory infection.       This test is only for use under the Food and Drug Administration's    Emergency Use Authorization (EUA). The SARS-CoV-2 assay on the    RP2 panel has been validated but FDA's independent review    of the validation is pending.     [9] The ePlex RP2 Panel canot differentiate variant viruses,    such as H3N2v, from seasonal influenza A viruses.    If variant virus infection is suspected, clinicians   should contact their state or local health department   to arrange specimen transport and request a timely   diagnosis at a state public health laboratory.     [10] The ePlex RP2 Panel canot differentiate variant viruses,    such as H3N2v, from seasonal influenza A viruses.    If variant virus infection is suspected, clinicians   should contact their state or local health department   to arrange specimen transport and request a timely   diagnosis at a state public health laboratory.     [11] This test utilizes a real-time RT-PCR test intended for   the simultaneous qualitative detection and differentiation   of SARS-CoV-2, influenza A, influenza B, and respiratory   syncytial virus (RSV) viral RNA. The analytical  sensitivity   (limit of detection) of the SARS-CoV-2 assay is 131 copies/mL.   Negative results do not rule out the possibility of SARS-CoV-2,   influenza A virus, influenza B virus and/or RSV infection    and should not be used as a sole basis for treatment or other   patient management decisions.      This test is only for use under the Food and Drug Administration's   Emergency Use Authorization (EUA). The [employer] Xpert Xpress   SARS-CoV-2/FLU/RSV Letter of Authorization, along with the    authorized Fact Sheet for Healthcare Providers, the authorized   Fact Sheet for Patients and authorized labeling are available   on the FDA website:      http://www.fda.gov/medical-devices/coronavirus-disease-2019-   covid-19-emergency-useauthorizations-medical-devices/vitro-   diagnostics-euas.     [12] This test utilizes real-time fluorescent reverse    transcription isothermal amplification to detect RNA    from the N and ORF-1ab genes of the SARS-CoV-2 virus.   The analytical sensitivity (limit of detection) of    this assay is 10 copies/uL.      A POSITIVE result is indicative of the presence of    SARS-CoV-2 RNA.  Clinical correlation with patient    history and other diagnostic information is necessary   to determine patient infection status.        A NOT DETECTED result does not preclude SARS-CoV-2    infection and should not be used as the sole basis for   patient management decisions.  If COVID-19 is strongly   suspected based on clinical and epidemiological history,   re-testing using an alternate molecular assay should   be considered.      This test is only for use under the Food and Drug   Administration s Emergency Use Authorization (EUA).     Commercial reagents are provided by [employer].   Performance characteristics of the EUA have been    independently verified by [facility]   Department of Pathology and Laboratory Medicine.   _______________________________________________________      [13] This test utilizes a real-time RT-PCR test intended for   the simultaneous qualitative detection and differentiation   of SARS-CoV-2, influenza A, influenza B, and respiratory   syncytial virus (RSV) viral RNA. The analytical sensitivity   (limit of detection) of the SARS-CoV-2 assay is 131 copies/mL.   Negative results do not rule out the possibility of SARS-CoV-2,   influenza A virus, influenza B virus and/or RSV infection    and should not be used as a sole basis for treatment or other   patient management decisions.      This test is only for use under the Food and Drug Administration's   Emergency Use Authorization (EUA). The [employer] Xpert Xpress   SARS-CoV-2/FLU/RSV Letter of Authorization, along with the    authorized Fact Sheet for Healthcare Providers, the authorized   Fact Sheet for Patients and authorized labeling are available   on the FDA website:      http://www.fda.gov/medical-devices/coronavirus-disease-2019-   covid-19-emergency-useauthorizations-medical-devices/vitro-   diagnostics-euas.        Latest Reference Range & Units 12/15/21 18:23 04/29/22 15:52   Specimen UA  Urine, Clean Catch Urine, Clean Catch   Color, UA Yellow, Straw, Margaret  Yellow Yellow   Appearance, UA Clear  Clear Clear   Specific Gravity, UA 1.005 - 1.030  1.003 1.010   PH, UA 5.0 - 8.0  6.5 7.0   Protein, UA Negative  Negative [1] Negative [2]   Glucose, UA Negative  Negative Negative   Ketones, UA Negative  Negative Negative   Occult Blood UA Negative  Trace ! Negative   NITRITE UA Negative  Negative Negative   UROBILINOGEN UA <2.0 EU/dL 0.2 0.2   Bilirubin (UA) Negative  Negative Negative   Leukocytes, UA Negative  2+ ! 1+ !   RBC, UA 0 - 4 /hpf  0 - 4 /hpf 1  1 1  1   WBC, UA 0 - 5 /hpf  0 - 5 /hpf 20 (H)  20 (H) 9 (H)  9 (H)   Bacteria, UA Negative /hpf  Negative /hpf Few !  Few ! Negative  Negative   Squam Epithel, UA /hpf 5  5 3  3   Hyaline Casts, UA 0 - 1 /lpf  0 - 1 /lpf 0  0 2 !  2 !   Yeast, UA None   Occasional !    Microscopic Comment  SEE COMMENT [3] SEE COMMENT [4]   !: Data is abnormal  (H): Data is abnormally high  [1] Recommend a 24 hour urine protein or a urine    protein/creatinine ratio if globulin induced proteinuria is   clinically suspected.     [2] Recommend a 24 hour urine protein or a urine    protein/creatinine ratio if globulin induced proteinuria is   clinically suspected.     [3] Other formed elements not mentioned in the report are not    present in the microscopic examination.      [4] Other formed elements not mentioned in the report are not    present in the microscopic examination. .     Latest Reference Range & Units 04/30/22 10:35   COLOR CSF Colorless  Colorless   Heme [place] mL 1.0   Appearance, CSF Clear  Clear   RBC, CSF 0 /cu mm 9 !   WBC, CSF 0 - 5 /cu mm 0   Glucose, CSF 40 - 70 mg/dL 55 [1]   Protein, CSF 12 - 60 mg/dL 33 [2]   Enterovirus Spec Source  CSF   Enterovirus DNA Probe  Not Detected [3]   !: Data is abnormal  [1] Infants: 60 to 80 mg/dL  [2] Infants can have higher CSF protein results due to increased   permeability of the blood-brain barrier.     [3] NOT DETECTED - A negative result does not rule out the    presence of PCR inhibitors in the patient specimen or    assay specific nucleic acid in concentrations below the    level of detection by the assay.   INTERPRETIVE INFORMATION: Enterovirus by PCR   This test was developed and its performance characteristics    determined by [facility]. It has not been cleared or    approved by the US Food and Drug Administration. This test    was performed in a CLIA certified laboratory and is    intended for clinical purposes.   Performed by [facility],                               [address] [place] [id number] [phone]                     [email or web address] Elsie [email or web address] MD - Lab. Director     4/29/2022 blood culture: negative  4/30/2022 CSF culture, negative        Latest Reference Range & Units 06/30/22  14:53 07/14/22 10:50   Sed Rate 0 - 36 mm/Hr  4   PTT Lupus Anticoagulant 32 - 48 sec  37   PTT-LA Mix 32 - 48 sec  Not Applicable   APA Isotype IgG 0.00 - 14.99 GPL  <9.40   APA Isotype IgM 0.00 - 12.49 MPL  12.30   Beta-2 Glyco 1 IgA <=20 ANNE  <9   Beta-2 Glyco 1 IgG <=20 SGU  <9   Beta-2 Glyco 1 IgM <=20 SMU  <9   dRVVT Confirm Negative ratio  Not Applicable   dRVVT Incubated 1:1 Mix 33 - 44 sec  Not Applicable   DRVVT Screen 33 - 44 sec  31 (L)   Hex Phosph Neut Test Negative   Not Applicable   Reptilase Time <=21.9 sec  Not Applicable   Thrombin Time 14.7 - 19.5 sec  Not Applicable   PLATELET NEUTRALIZATION APTT Negative   Not Applicable   Lupus Anticoagulant Interpretation   See Note   PT Lupus Anticoagulant 12.0 - 15.5 sec  13.2   PTT Heparin Neutralized 32 - 48 sec  Not Applicable   Sodium 136 - 145 mmol/L 141    Potassium 3.5 - 5.1 mmol/L 4.0    Chloride 95 - 110 mmol/L 107    CO2 22 - 31 mmol/L 24    Anion Gap 8 - 16 mmol/L 10    BUN 7 - 18 mg/dL 8    Creatinine 0.50 - 1.40 mg/dL 0.62    eGFR if non African American >60 mL/min/1.73 m^2 >60    eGFR if African American >60 mL/min/1.73 m^2 >60    Glucose 70 - 110 mg/dL 116 (H)    Calcium 8.4 - 10.2 mg/dL 9.9    Magnesium 1.6 - 2.6 mg/dL 1.8    Alkaline Phosphatase 38 - 145 U/L 66    PROTEIN TOTAL 6.0 - 8.4 g/dL 7.7    Albumin 3.2 - 4.7 g/dL 4.9 (H)    BILIRUBIN TOTAL 0.2 - 1.3 mg/dL 0.3    AST 14 - 36 U/L 27    ALT 0 - 35 U/L 15    CRP 0.0 - 8.2 mg/L  <0.3   CPK 20 - 180 U/L  52   Zinc, Serum-ALT 60.0 - 120.0 ug/dL 85.9    Thyroglobulin, LC/MS/MS 1.3 - 31.8 ng/mL Not Applicable    TSH 0.400 - 4.000 uIU/mL 0.566    Thyroglobulin 1.3 - 31.8 ng/mL 10.7    Thyroperoxidase Antibodies 0.0 - 9.0 IU/mL 1.9    Thyroglobulin Ab Screen 0.0 - 4.0 IU/mL <0.9    ALLERGEN GLUTEN IGE <=0.34 kU/L <0.10    Allergen Maple ([place]) IgE <=0.34 kU/L <0.10    Alternaria alternata <=0.34 kU/L <0.10    Aspergillus fumigatus <=0.34 kU/L <0.10    BERMUDA GRASS <=0.34 kU/L <0.10     Birch (T3) IgE <=0.34 kU/L <0.10    Cat Dander <=0.34 kU/L <0.10    Cedar IgE <=0.34 kU/L <0.10    Cladosporium Herbarum (M2) IgE <=0.34 kU/L <0.10    Clams <=0.34 kU/L <0.10    Cockroach, IgE <=0.34 kU/L <0.10    Codfish IgE <=0.34 kU/L <0.10    Common Pigweed IgE <=0.34 kU/L <0.10    Corn <=0.34 kU/L <0.10    Morris IgE <=0.34 kU/L <0.10    D. farinae <=0.34 kU/L <0.10    Dog Dander, IgE <=0.34 kU/L <0.10    Egg White <=0.34 kU/L <0.10    Milk IgE <=0.34 kU/L <0.10    Mite Dust Pteronyssinus IgE <=0.34 kU/L <0.10    Allergen Peanut IgE <=0.34 kU/L <0.10    Pecan [place] Tree <=0.34 kU/L <0.10    Penicillium, IgE <=0.34 kU/L <0.10    Ragweed, Short, IgE <=0.34 kU/L <0.10    RAST Allergen Interpretation  See Note    Rough Marshelder <=0.34 kU/L <0.10    SCALLOP IGE <=0.34 kU/L <0.10    Sesame Seed, IgE <=0.34 kU/L <0.10    SHRIMP IGE <=0.34 kU/L <0.10    Soybean IgE <=0.34 kU/L <0.10    [id number] Elm, American (White <=0.34 kU/L <0.10    [place] Grass <=0.34 kU/L <0.10    WALNUT <=0.34 kU/L <0.10    [place] Tree, IgE <=0.34 kU/L <0.10    Wheat IgE <=0.34 kU/L <0.10    WHITE HANG TREE <=0.34 kU/L <0.10    White [place] (T70) IgE <=0.34 kU/L <0.10    White Oak(Quercus alba) IgE <=0.34 kU/L <0.10    ds DNA Ab Negative 1:10  10 Negative 1:10   EDISON IgG by IFA <1:80  Detected (H)    SSA 60 (Ro) PAU Antibody 0 - 40 AU/mL 1    Sarah (PAU) Ab, IgG 0 - 40 AU/mL 1    EDISON Pattern  Speckled !    EDISON Titer  1:80 !    EDISON Interpretive Comment  See Note    Celiac Anit-Human Tissue Transglutaminase Iga 0 - 3 U/mL <2    Chromatin Nucleosomal Antibody 0 - 19 Units 24 (H)    SCL-70 Antibody 0 - 40 AU/mL 2    SM/RNP Antibody 0 - 19 Units 4    SSA Antibody 0 - 40 AU/mL 5    SSB Antibody 0 - 40 AU/mL 0    Complement (C-3) 50 - 180 mg/dL  107   Complement (C-4) 11 - 44 mg/dL  11   IgG 650 - 1600 mg/dL 896    IgM 50 - 300 mg/dL 267    IgA 60 - 349 mg/dL  40 - 350 mg/dL 102  106    IgE 0 - 100 IU/mL <35    IgG 1 325 - 894 mg/dL 648     IgG 2 156 - 625 mg/dL 37 (L)    IgG 3 34 - 246 mg/dL 51    IgG 4 2 - 170 mg/dL 3    CCP Antibodies <5.0 U/mL  <0.5   Rheumatoid Factor 0.0 - 15.0 IU/mL  <13.0   Absolute CD19 91 - 610 cells/uL 208    Absolute CD3 570 - 2400 cells/uL 1358    Absolute CD4 430 - 1800 cells/uL 821    Absolute CD8 210 - 1200 cells/uL 435    Absolute Natural Killer Cells 78 - 470 cells/uL 151    CD19 B Cells 6 - 23 % 12    CD3 % Total T Cell 62 - 87 % 78    CD4 % Anthony T Cell 32 - 64 % 47    CD4/CD8 Ratio 0.80 - 3.90 ratio 1.88    CD8 % Suppressor T Cell 15 - 46 % 25    Lymphocyte Subset Pnl 5 Information  See Note    NATURAL KILLER CELLS 4 - 26 % 9    Direct Yelena (JANINE)   NEG   Pneumococcal Serotype 1 IgG (P13,PNX) ug/mL 0.15    Pneumococcal Serotype 12F IgG (PNX) ug/mL 0.46    Pneumococcal Serotype 14 IgG (P7,P13,PNX) ug/mL 6.09    Pneumococcal Serotype 18C IgG (P7,P13,PNX) ug/mL 10.55    Pneumococcal Serotype 19F IgG (P7,P13,PNX) ug/mL 9.83    Pneumococcal Serotype 23F IgG (P7,P13,PNX) ug/mL 1.74    Pneumococcal Serotype 3 IgG  (P13,PNX) ug/mL 1.43    Pneumococcal Serotype 4 IgG  (P7,P13,PNX) ug/mL 3.76    Pneumococcal Serotype 5 IgG (P13,PNX) ug/mL 1.14    Pneumococcal Serotype 6B IgG (P7,P13,PNX) ug/mL 7.20    Pneumococcal Serotype 7F IgG (P13,PNX) ug/mL 2.43    Pneumococcal Serotype 8 IgG (PNX) ug/mL 1.38    Pneumococcal Serotype 9N IgG (PNX) ug/mL 0.85    Pneumococcal Serotype 9V IgG (P7,P13,PNX) ug/mL 2.10    (L): Data is abnormally low  (H): Data is abnormally high  !: Data is abnormal      --------imaging studies------------    9/21/2022 MRI brain (report): There is no acute intracranial abnormality.  Considering differences in modality, there is no change in the appearance of the brain compared to the prior study.  Brain volume, ventricular size and position are normal.  There is no hemorrhage or mass/mass effect.  There are no regions of abnormal signal intensity in the brain.  There are no regions of restricted diffusion  to suggest acute infarction.  There is no pathologic enhancement.  The basilar cisterns are open.  There is no abnormal extra-axial fluid collection.  Flow voids indicating patency are present in the major vessels at the base of the brain.  The cerebellar tonsils are just at the level of the foramen magnum in normal position.  The sellar structures are normal.  The orbits are grossly normal.     Skull/extracranial contents: Marrow signal intensity in the clivus and calvarium is grossly normal.  There is trace mucosal thickening in the ethmoid air cells.  There is a tiny mucous retention cyst along the lateral wall of the left maxillary sinus.  Otherwise, the paranasal sinuses and mastoid air cells are clear.  The included facial soft tissues and scalp are normal.     Impression:     1.  Normal imaging of the brain.  There is no acute abnormality.  There is no hemorrhage, mass/mass effect, acute infarction.  There is no pathologic enhancement.    9/21/2022 MRA Brain (report): FINDINGS:  Anterior circulation: There is normal flow related signal intensity within the petrous and cavernous segments of the internal carotid arteries.  The supraclinoid internal carotid arteries are normal as is the carotid terminus bilaterally.  There is normal branching into the anterior and middle cerebral arteries.  These vessels are patent.  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  There is no critical stenosis, occlusion, thrombosis, dissection, aneurysm or other vascular malformation.     Posterior circulation: The vertebral arteries are patent.  The basilar artery is normal in caliber and contour.  The basilar tip is normal.  There is normal branching into the superior cerebellar and posterior cerebral arteries.  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  There is no critical stenosis, occlusion, thrombosis, dissection, aneurysm.     Impression:     1. Normal brain MRA.      4/25/2022 CT  "head (Report): FINDINGS:  Gray-white matter differentiation is within normal limits.   No acute intracranial hemorrhage, extra-axial fluid collection, hydrocephalus, mass effect, midline shift is noted.  No large vessel territory acute ischemia is identified.  Visualized paranasal sinuses demonstrate minimal scattered mucoperiosteal thickening visualized mastoid air cells are clear.  No acute displaced calvarial fracture is identified.     Impression:     1. No acute intracranial abnormalities identified.            Other testing:  Reviewed in chart     Note: I have independently reviewed any/all imaging/labs/tests and agree with the report (s) as documented.  Any discrepancies will be as noted/demarcated by free text.  TYLER GRIJALVA 9/28/2022                     ROS:   Review Of Systems (questions asked, positive or additions in BOLD)  Gen: Weight change, fatigue/malaise, pyrexia   HEENT: Tinnitus, headache,  blurred vision, eye pain, diplopia, photophobia,  nose bleeds, congestion, sore throat, jaw pain, scalp pain, neck stiffness   Card: Palpitations, CP   Pulm: SOB, cough   Vas: Easy bruising, easy bleeding   GI: N/V/D/C, incontinence, hematemesis, hematochezia    : incontinence, hematuria   Endocrine: Temp intolerance, polyuria, polydipsia   M/S: Neck pain, myalgia, back pain, joint pain, falls    Neuro: PER HPI   PSY: Memory loss, confusion, depression, anxiety, trouble in sleep          EXAM:   /81 (BP Location: Left arm, Patient Position: Sitting, BP Method: Medium (Automatic))   Pulse 71   Resp 17   Ht 5' 2" (1.575 m)   Wt 59.9 kg (132 lb)   BMI 24.14 kg/m²      GEN:  NAD  HEENT: NC/AT, nares patent, dentition appropriate         NEURO:  Mental Status:  Awake, alert and appropriately oriented to time, place, and person.  Decreased attention and concentration at today's visit.  Speech is fluent and appropriate language function (I.e., comprehension), eye contact throughout visit WNL       Cranial " Nerves:   Extraoccular  movements are intact and without nystagmus.  Visual fields are full to confrontation testing.    Facial movement is symmetric.  Hearing is normal. Uvula in midline. FROM of neck in all (6) directions, shoulder shrug symmetrical. Tongue in midline without fasiculation.                Motor: antigravity in all limbs, bilat UE    No drift  No resting tremor         Gait and Stance: Normal manner of stance and gait function testing.            This document has been electronically signed by Mr. Cirilo House MPA, PA-C on 9/28/2022, I have personally typed this message using the EMR.       Dr Tawanda MD was available during today's visit.      Personal Protective Equipment:    Personal Protective Equipment was used during this encounter including;    mask-surgical, and non latex gloves.       CC: Tonny Busch MD             LR

## 2022-10-03 NOTE — HISTORY OF PRESENT ILLNESS
[FreeTextEntry1] : 66M with pAF, HTN P. Vera on Jakafi who presents for cardiac evaluation after Dr. Lamas retired \par \par He continues to feel frequent lightheadedness, occasional headaches\par Has not felt any better since decreasing Cardizem dose \par His lightheadedness comes and goes\par Today, he feels a sinus pressure which is new\par \par HISTORY:\par \par Generally cannot tell when he is in Afib\par Had AFib post op 2013, short course of asa which was stopped. Subsequently noted to be in Afib in 2018, started on Xarelto. No bleeding complications from Xarelto \par Had been on cardizem first, then metoprolol was added due to poorly controlled HR\par \par Never smoker. Father had CAD later in life. Works as a  for [employer]. \par \par ECG: Sinus bradycardia at 49\par TTE 2018: 70-75%, mild MR\par NST: EF 55%, no ischemia\par \par Diltiazem 120mg, Lasix 20mg prn, Toprol 12.5mg, Rosuvastatin 5mg, Xarelto 20mg  Attending Attestation (For Attendings USE Only)...

## 2022-10-14 ENCOUNTER — NON-APPOINTMENT (OUTPATIENT)
Age: 67
End: 2022-10-14

## 2022-10-14 ENCOUNTER — APPOINTMENT (OUTPATIENT)
Dept: ELECTROPHYSIOLOGY | Facility: CLINIC | Age: 67
End: 2022-10-14

## 2022-10-14 VITALS
DIASTOLIC BLOOD PRESSURE: 75 MMHG | WEIGHT: 225 LBS | SYSTOLIC BLOOD PRESSURE: 134 MMHG | OXYGEN SATURATION: 98 % | BODY MASS INDEX: 29.82 KG/M2 | HEIGHT: 73 IN | HEART RATE: 54 BPM

## 2022-10-14 DIAGNOSIS — R42 DIZZINESS AND GIDDINESS: ICD-10-CM

## 2022-10-14 DIAGNOSIS — R06.83 SNORING: ICD-10-CM

## 2022-10-14 PROCEDURE — 93000 ELECTROCARDIOGRAM COMPLETE: CPT

## 2022-10-14 PROCEDURE — 99204 OFFICE O/P NEW MOD 45 MIN: CPT | Mod: 25

## 2022-10-14 RX ORDER — METOPROLOL SUCCINATE 25 MG/1
25 TABLET, EXTENDED RELEASE ORAL
Qty: 90 | Refills: 0 | Status: DISCONTINUED | COMMUNITY
Start: 2019-07-03 | End: 2022-10-14

## 2022-10-14 RX ORDER — AZITHROMYCIN 250 MG/1
250 TABLET, FILM COATED ORAL
Qty: 1 | Refills: 1 | Status: DISCONTINUED | COMMUNITY
Start: 2022-10-09 | End: 2022-10-14

## 2022-10-14 RX ORDER — FUROSEMIDE 20 MG/1
20 TABLET ORAL
Qty: 10 | Refills: 0 | Status: DISCONTINUED | COMMUNITY
Start: 2019-07-28 | End: 2022-10-14

## 2022-10-14 RX ORDER — AZITHROMYCIN 250 MG/1
250 TABLET, FILM COATED ORAL
Qty: 1 | Refills: 0 | Status: DISCONTINUED | COMMUNITY
Start: 2021-05-27 | End: 2022-10-14

## 2022-10-14 NOTE — HISTORY OF PRESENT ILLNESS
[FreeTextEntry1] : Mr. Prasanna Edwards is a 67 year old man with paroxysmal atrial fibrillation, hypertension and polycythemia vera (on Jakafi) who presents today for an initial evaluation of atrial fibrillation. The patient's paroxysmal atrial fibrillation was initial detected in the post-operative period following a right laproscopic partial nephrectomy in 2013. He was subsequently found to be in atrial fibrillation in 2018.\par \par The patient recently reported feelings of lightheadedness and fatigue. His AF burden from 2021 was 16%. He is maintained on Xarelto 20mg (CHADS2-VASC: 2), Diltiazem ER 120mg and Toprol XL 25mg p.o. daily.  He states he has had episodes of lightheadedness going back several years, however more recently his episodes of lightheadedness has increased in frequency. There is no correlation with his episodes of lightheadedness and positional changes. He states they occur when he is walking and usually last for a couple minutes. He denies any chest pain, shortness of breath or syncope. He also reports infrequent episodes of palpitations, which he describes as a, "racing heart." These episodes of palpitations are independent of his episodes of lightheadedness. He reports that Dr. Justice has been adjusting his Diltiazem and Toprol secondary to his episodes of lightheadedness. He has not noted an improvement in his dizziness with reduction of his medications.\par \par He had a recent echocardiogram from July 2022 that demonstrated a normal left ventricular systolic function and a left atrial size of 4.29 cm. He has not had any recent long-term telemetry monitoring. He also has not had an evaluation for sleep apnea although he does report snoring. He denies daytime somnolence or apneic events during sleep.

## 2022-10-14 NOTE — CARDIOLOGY SUMMARY
[de-identified] : ECG from 10/14/2022: Sinus bradycardia at 56 bpm\par \par ECG from 6/23/2022: Sinus elaine @ 55bpm, normal axis, normal intervals [de-identified] : Az XT from 8/11/2021-8/18/2021: min HR: 36, mean HR: 69, max HR: 235, 120 episodes of NSVT - HR range 129-135bpm, fastest 6 beats 235bpm, longest 15 beats at 154bpm, AF burden 16% - longest episode of AF 12 hours, 37 mins, mean HR in AF: 99bpm [de-identified] : TTE from 7/26/2022: LA: 4.29, LASHAUN: 28.1, normal global LVSF, LVEF: 60-65%, normal diastolic filling, normal RV size and function, mild RA dilation, no pericardial effusion, trace MR, trivial TR\par \par TTE from 8/13/2021: EF: 60-65%, LA: 4.25, LASHAUN: 28.7 [de-identified] : CT Angiography Coronary from 9/13/2021: short segment superficial myocardial bridging in the mid LAD without stenosis, mild burden of coronary calcium, score 21.2 (34th percentile), small pericardial effusion, mildly dilated PA

## 2022-10-14 NOTE — PHYSICAL EXAM
[Well Developed] : well developed [Well Nourished] : well nourished [No Acute Distress] : no acute distress [Normal Conjunctiva] : normal conjunctiva [Normal Venous Pressure] : normal venous pressure [Normal S1, S2] : normal S1, S2 [No Murmur] : no murmur [No Rub] : no rub [No Gallop] : no gallop [No Respiratory Distress] : no respiratory distress  [Wheeze ____] : wheeze [unfilled] [Soft] : abdomen soft [Non Tender] : non-tender [Normal Bowel Sounds] : normal bowel sounds [Normal Gait] : normal gait [Gait - Sufficient for Exercise Testing] : gait - sufficient for exercise testing [No Edema] : no edema [No Cyanosis] : no cyanosis [No Rash] : no rash [No Skin Lesions] : no skin lesions [Moves all extremities] : moves all extremities [No Focal Deficits] : no focal deficits [Normal Speech] : normal speech [Alert and Oriented] : alert and oriented [Normal memory] : normal memory

## 2022-10-14 NOTE — REVIEW OF SYSTEMS
[Dizziness] : dizziness [Negative] : Heme/Lymph [FreeTextEntry5] : See HPI [FreeTextEntry6] : Currently with some congestion and a cough

## 2022-10-14 NOTE — DISCUSSION/SUMMARY
[EKG obtained to assist in diagnosis and management of assessed problem(s)] : EKG obtained to assist in diagnosis and management of assessed problem(s) [Paroxysmal Atrial Fibrillation] : paroxysmal atrial fibrillation [FreeTextEntry1] : In summary, Mr. Edwards is a 67-year-old gentleman with hypertension, polycythemia vera, paroxysmal atrial fibrillation and symptoms of lightheadedness. It is unclear if his symptom of lightheadedness is secondary to periods of atrial fibrillation. Conversion pauses less likely given that the lightheadedness persists for minutes.\par \par We recommend a 2-week Zio patch to assess his AF burden as well as correlate his symptoms to his heart rhythm. If his symptoms do not correlate to any arrhythmias we would recommend continuation of the current regimen. If his symptoms correlated with episodes of atrial fibrillation we discussed options for management of paroxysmal atrial fibrillation including both rate and rhythm control strategies. Details including antiarrhythmic medications and catheter ablation were reviewed. He will call us 1 week after mailing his Zio patch monitor to discuss results and to discuss further management strategies.  We have also ordered a home sleep study to assess for sleep apnea.\par \par I, Dr. Cowart, personally performed the evaluation and management (E/M) services for this new patient. That E/M includes conducting the initial examination, assessing all conditions, and establishing the plan of care. Today, my ACP, Shantel Romano PA-C, was here to observe my evaluation and management services for this patient to be followed going forward.

## 2022-10-21 ENCOUNTER — APPOINTMENT (OUTPATIENT)
Dept: INTERNAL MEDICINE | Facility: CLINIC | Age: 67
End: 2022-10-21

## 2022-10-21 VITALS
BODY MASS INDEX: 28.49 KG/M2 | OXYGEN SATURATION: 97 % | HEIGHT: 73 IN | HEART RATE: 63 BPM | TEMPERATURE: 97.7 F | SYSTOLIC BLOOD PRESSURE: 118 MMHG | WEIGHT: 215 LBS | DIASTOLIC BLOOD PRESSURE: 79 MMHG

## 2022-10-21 VITALS — HEART RATE: 129 BPM

## 2022-10-21 PROCEDURE — 99213 OFFICE O/P EST LOW 20 MIN: CPT

## 2022-10-21 NOTE — PLAN
[FreeTextEntry1] : post viral  reactive airways.  told to use pepcid  as is on xarelto.  if no better in a week will do cxr

## 2022-10-21 NOTE — PHYSICAL EXAM
[Normal Sclera/Conjunctiva] : normal sclera/conjunctiva [Normal Outer Ear/Nose] : the outer ears and nose were normal in appearance [Normal Oropharynx] : the oropharynx was normal [No JVD] : no jugular venous distention [Supple] : supple [No Respiratory Distress] : no respiratory distress  [No Accessory Muscle Use] : no accessory muscle use [Normal Rate] : normal rate  [Regular Rhythm] : with a regular rhythm [Normal S1, S2] : normal S1 and S2 [No Edema] : there was no peripheral edema [Normal] : affect was normal and insight and judgment were intact [de-identified] : few wheezes.

## 2022-10-21 NOTE — HISTORY OF PRESENT ILLNESS
[FreeTextEntry1] : had   nasal and sinus congestion staring 2 weeks ago  and now has  bad cough.  he got a  zpack  which did nothing. he says his  chest feels like he cannot bring up sputum  despite mucinex. he has been having periods of dizziness and has a zio patch ordered  to  see if any documented  tachycardia.

## 2022-11-01 ENCOUNTER — FORM ENCOUNTER (OUTPATIENT)
Age: 67
End: 2022-11-01

## 2022-11-02 ENCOUNTER — TRANSCRIPTION ENCOUNTER (OUTPATIENT)
Age: 67
End: 2022-11-02

## 2022-11-08 ENCOUNTER — APPOINTMENT (OUTPATIENT)
Dept: PULMONOLOGY | Facility: CLINIC | Age: 67
End: 2022-11-08

## 2022-11-08 VITALS
BODY MASS INDEX: 29.03 KG/M2 | SYSTOLIC BLOOD PRESSURE: 132 MMHG | TEMPERATURE: 97.2 F | HEART RATE: 60 BPM | DIASTOLIC BLOOD PRESSURE: 71 MMHG | OXYGEN SATURATION: 99 % | HEIGHT: 73 IN | WEIGHT: 219 LBS

## 2022-11-08 PROCEDURE — 99203 OFFICE O/P NEW LOW 30 MIN: CPT

## 2022-11-08 PROCEDURE — 99213 OFFICE O/P EST LOW 20 MIN: CPT

## 2022-11-14 NOTE — PHYSICAL EXAM
[General Appearance - Well Developed] : well developed [Normal Appearance] : normal appearance [Well Groomed] : well groomed [General Appearance - Well Nourished] : well nourished [No Deformities] : no deformities [General Appearance - In No Acute Distress] : no acute distress [Normal Conjunctiva] : the conjunctiva exhibited no abnormalities [Eyelids - No Xanthelasma] : the eyelids demonstrated no xanthelasmas [Low Lying Soft Palate] : low lying soft palate [Enlarged Base of the Tongue] : enlargement of the base of the tongue [II] : II [Neck Appearance] : the appearance of the neck was normal [Heart Rate And Rhythm] : heart rate was normal and rhythm regular [Heart Sounds] : normal S1 and S2 [] : no respiratory distress [Respiration, Rhythm And Depth] : normal respiratory rhythm and effort [Exaggerated Use Of Accessory Muscles For Inspiration] : no accessory muscle use [Auscultation Breath Sounds / Voice Sounds] : lungs were clear to auscultation bilaterally [Abnormal Walk] : normal gait [Motor Tone] : muscle strength and tone were normal [Nail Clubbing] : no clubbing of the fingernails [Cyanosis, Localized] : no localized cyanosis [Skin Color & Pigmentation] : normal skin color and pigmentation [Cranial Nerves] : cranial nerves 2-12 were intact [No Focal Deficits] : no focal deficits [Oriented To Time, Place, And Person] : oriented to person, place, and time [Impaired Insight] : insight and judgment were intact

## 2022-11-14 NOTE — ASSESSMENT
[FreeTextEntry1] : 68 yo M presents for initial evaluation of sleep disordered breathing \par PMH includes paroxysmal Afib, HTN, P. vera, bladder cancer s/p resection. \par Undergoing Zio patch 2 week evaluation of Afib and relationship to his episodes of dizziness. \par Found to have moderate SHRAVAN on recent Watchpat HST.\par \par Plan:\par I had an extensive discussion with  Prasanna and explained the rationale for diagnosis and treatment of sleep apnea including its effect on quality of life and long term cardiovascular risk. \par Treatment, including CPAP and oral mandibular advancement device, were discussed. At this time, will trial PAP therapy and I will order him an APAP titration study to be performed through the [facility]. \par Will call for results 1 week after completion. \par \par

## 2022-11-14 NOTE — REVIEW OF SYSTEMS
[Nocturia] : nocturia [Difficulty Initiating Sleep] : no difficulty falling asleep [Difficulty Maintaining Sleep] : no difficulty maintaining sleep [Lower Extremity Discomfort] : no lower extremity discomfort [Unusual Sleep Behavior] : no unusual sleep behavior [Hypersomnolence] : not sleeping much more than usual [Negative] : Psychiatric [FreeTextEntry3] : per hpi [FreeTextEntry8] : per hpi [FreeTextEntry9] : per hpi [de-identified] : per hpi

## 2022-11-14 NOTE — HISTORY OF PRESENT ILLNESS
[FreeTextEntry1] : 66 yo M presents for initial evaluation of sleep disordered breathing\par Referred by Dr. Cowart. \par PMH: paroxysmal Afib, HTN, P. vera, bladder cancer s/p resection. \par Undergoing Zio patch 2 week evaluation of Afib and relationship to his episodes of dizziness. \par \par Watchpat HST 11/2/22: moderate SHRAVAN AHI 19.1/hr, T<90 of 0.1%\par \par Sleep history: \par +snoring\par Bed: 12-2 am, no difficultly falling asleep, wakes 3-x to urinate, out of bed at 8 am \par Has woken up feeling dizzy\par Unrefreshed upon awakening. \par Morning headaches:  denies\par Dry mouth/throat: denies\par Weight trend: stable\par Denies drowsy driving, denies any accidents or near accidents. \par EDS with ESS of 7\par \par Quality Metrics:\par Smoking history: denies\par ESS: 7\par \par Vaccines: \par COVID: Moderna x2 and 2 boosters\par Influenza: received in Oct 2022\par Pneumococcal: received last year\par

## 2022-11-14 NOTE — CONSULT LETTER
[Dear  ___] : Dear  [unfilled], [Consult Letter:] : I had the pleasure of evaluating your patient, [unfilled]. [( Thank you for referring [unfilled] for consultation for _____ )] : Thank you for referring [unfilled] for consultation for [unfilled] [Please see my note below.] : Please see my note below. [Consult Closing:] : Thank you very much for allowing me to participate in the care of this patient.  If you have any questions, please do not hesitate to contact me. [Sincerely,] : Sincerely, [FreeTextEntry2] : Dr. Baldemar Cowart [FreeTextEntry3] : Loretta Kelly MD, FAASM\par  of Medicine\par Associate , Fellowship in Pulmonary and Critical Care Medicine\par Division of Pulmonary, Critical Care & Sleep Medicine\par [employer] [facility]\par \par \par  [DrNegro  ___] : Dr. GROVES

## 2022-11-18 ENCOUNTER — NON-APPOINTMENT (OUTPATIENT)
Age: 67
End: 2022-11-18

## 2022-11-29 ENCOUNTER — OUTPATIENT (OUTPATIENT)
Dept: OUTPATIENT SERVICES | Facility: HOSPITAL | Age: 67
LOS: 1 days | End: 2022-11-29
Payer: COMMERCIAL

## 2022-11-29 ENCOUNTER — APPOINTMENT (OUTPATIENT)
Dept: SLEEP CENTER | Facility: CLINIC | Age: 67
End: 2022-11-29

## 2022-11-29 DIAGNOSIS — Z98.89 OTHER SPECIFIED POSTPROCEDURAL STATES: Chronic | ICD-10-CM

## 2022-11-29 DIAGNOSIS — M95.9 ACQUIRED DEFORMITY OF MUSCULOSKELETAL SYSTEM, UNSPECIFIED: Chronic | ICD-10-CM

## 2022-11-29 PROCEDURE — G0400: CPT

## 2022-11-29 PROCEDURE — G0400: CPT | Mod: 26

## 2022-12-08 ENCOUNTER — APPOINTMENT (OUTPATIENT)
Dept: PULMONOLOGY | Facility: CLINIC | Age: 67
End: 2022-12-08

## 2022-12-08 PROCEDURE — 99442: CPT

## 2022-12-12 ENCOUNTER — APPOINTMENT (OUTPATIENT)
Dept: UROLOGY | Facility: CLINIC | Age: 67
End: 2022-12-12

## 2022-12-20 ENCOUNTER — APPOINTMENT (OUTPATIENT)
Dept: INTERNAL MEDICINE | Facility: CLINIC | Age: 67
End: 2022-12-20

## 2022-12-20 VITALS
HEART RATE: 74 BPM | HEIGHT: 73 IN | OXYGEN SATURATION: 98 % | SYSTOLIC BLOOD PRESSURE: 146 MMHG | TEMPERATURE: 97.8 F | WEIGHT: 217 LBS | BODY MASS INDEX: 28.76 KG/M2 | DIASTOLIC BLOOD PRESSURE: 67 MMHG

## 2022-12-20 DIAGNOSIS — Z20.822 CONTACT WITH AND (SUSPECTED) EXPOSURE TO COVID-19: ICD-10-CM

## 2022-12-20 DIAGNOSIS — Z12.5 ENCOUNTER FOR SCREENING FOR MALIGNANT NEOPLASM OF PROSTATE: ICD-10-CM

## 2022-12-20 PROCEDURE — 99213 OFFICE O/P EST LOW 20 MIN: CPT

## 2022-12-20 NOTE — HISTORY OF PRESENT ILLNESS
[FreeTextEntry8] : 10  days ago  developed cough and runny nose. initially achy but no def fevr.  cough lingers and is dry  but less frequent. no def sinus sx.

## 2022-12-20 NOTE — PHYSICAL EXAM
[Normal Sclera/Conjunctiva] : normal sclera/conjunctiva [Normal Outer Ear/Nose] : the outer ears and nose were normal in appearance [No JVD] : no jugular venous distention [Supple] : supple [No Respiratory Distress] : no respiratory distress  [No Accessory Muscle Use] : no accessory muscle use [Clear to Auscultation] : lungs were clear to auscultation bilaterally [Normal Rate] : normal rate  [Regular Rhythm] : with a regular rhythm [Normal S1, S2] : normal S1 and S2 [No Edema] : there was no peripheral edema [No Rash] : no rash [Coordination Grossly Intact] : coordination grossly intact [Normal] : affect was normal and insight and judgment were intact

## 2022-12-22 LAB
ALBUMIN SERPL ELPH-MCNC: 4.5 G/DL
ALP BLD-CCNC: 76 U/L
ALT SERPL-CCNC: 31 U/L
ANION GAP SERPL CALC-SCNC: 11 MMOL/L
AST SERPL-CCNC: 27 U/L
BASOPHILS # BLD AUTO: 0.01 K/UL
BASOPHILS NFR BLD AUTO: 0.3 %
BILIRUB SERPL-MCNC: 0.4 MG/DL
BUN SERPL-MCNC: 18 MG/DL
CALCIUM SERPL-MCNC: 9.2 MG/DL
CHLORIDE SERPL-SCNC: 105 MMOL/L
CHOLEST SERPL-MCNC: 153 MG/DL
CO2 SERPL-SCNC: 26 MMOL/L
CREAT SERPL-MCNC: 1.09 MG/DL
EGFR: 75 ML/MIN/1.73M2
EOSINOPHIL # BLD AUTO: 0.02 K/UL
EOSINOPHIL NFR BLD AUTO: 0.6 %
ESTIMATED AVERAGE GLUCOSE: 128 MG/DL
GLUCOSE SERPL-MCNC: 111 MG/DL
HBA1C MFR BLD HPLC: 6.1 %
HCT VFR BLD CALC: 30.5 %
HDLC SERPL-MCNC: 51 MG/DL
HGB BLD-MCNC: 9.3 G/DL
IMM GRANULOCYTES NFR BLD AUTO: 1.5 %
INR PPP: 1.93 RATIO
LDLC SERPL CALC-MCNC: 90 MG/DL
LYMPHOCYTES # BLD AUTO: 0.56 K/UL
LYMPHOCYTES NFR BLD AUTO: 16.6 %
MAN DIFF?: NORMAL
MCHC RBC-ENTMCNC: 27.4 PG
MCHC RBC-ENTMCNC: 30.5 GM/DL
MCV RBC AUTO: 90 FL
MONOCYTES # BLD AUTO: 0.37 K/UL
MONOCYTES NFR BLD AUTO: 11 %
NEUTROPHILS # BLD AUTO: 2.36 K/UL
NEUTROPHILS NFR BLD AUTO: 70 %
NONHDLC SERPL-MCNC: 103 MG/DL
PLATELET # BLD AUTO: 210 K/UL
POTASSIUM SERPL-SCNC: 4.6 MMOL/L
PROT SERPL-MCNC: 6.4 G/DL
PSA SERPL-MCNC: 0.18 NG/ML
PT BLD: 22.5 SEC
RBC # BLD: 3.39 M/UL
RBC # FLD: 17.8 %
SODIUM SERPL-SCNC: 141 MMOL/L
TRIGL SERPL-MCNC: 63 MG/DL
WBC # FLD AUTO: 3.37 K/UL

## 2022-12-23 LAB — SARS-COV-2 N GENE NPH QL NAA+PROBE: NOT DETECTED

## 2022-12-27 ENCOUNTER — OUTPATIENT (OUTPATIENT)
Dept: INPATIENT UNIT | Facility: HOSPITAL | Age: 67
LOS: 1 days | End: 2022-12-27
Payer: COMMERCIAL

## 2022-12-27 ENCOUNTER — TRANSCRIPTION ENCOUNTER (OUTPATIENT)
Age: 67
End: 2022-12-27

## 2022-12-27 VITALS
OXYGEN SATURATION: 99 % | HEART RATE: 73 BPM | SYSTOLIC BLOOD PRESSURE: 111 MMHG | TEMPERATURE: 98 F | RESPIRATION RATE: 16 BRPM | DIASTOLIC BLOOD PRESSURE: 58 MMHG

## 2022-12-27 VITALS
OXYGEN SATURATION: 100 % | WEIGHT: 220.02 LBS | HEART RATE: 72 BPM | RESPIRATION RATE: 16 BRPM | TEMPERATURE: 97 F | HEIGHT: 73 IN | SYSTOLIC BLOOD PRESSURE: 134 MMHG | DIASTOLIC BLOOD PRESSURE: 76 MMHG

## 2022-12-27 DIAGNOSIS — Z98.89 OTHER SPECIFIED POSTPROCEDURAL STATES: Chronic | ICD-10-CM

## 2022-12-27 DIAGNOSIS — M95.9 ACQUIRED DEFORMITY OF MUSCULOSKELETAL SYSTEM, UNSPECIFIED: Chronic | ICD-10-CM

## 2022-12-27 DIAGNOSIS — I48.0 PAROXYSMAL ATRIAL FIBRILLATION: ICD-10-CM

## 2022-12-27 LAB
BLD GP AB SCN SERPL QL: NEGATIVE — SIGNIFICANT CHANGE UP
HCT VFR BLD CALC: 30.6 % — LOW (ref 39–50)
HGB BLD-MCNC: 9.5 G/DL — LOW (ref 13–17)
MCHC RBC-ENTMCNC: 27.7 PG — SIGNIFICANT CHANGE UP (ref 27–34)
MCHC RBC-ENTMCNC: 31 GM/DL — LOW (ref 32–36)
MCV RBC AUTO: 89.2 FL — SIGNIFICANT CHANGE UP (ref 80–100)
NRBC # BLD: 0 /100 WBCS — SIGNIFICANT CHANGE UP (ref 0–0)
PLATELET # BLD AUTO: 174 K/UL — SIGNIFICANT CHANGE UP (ref 150–400)
RBC # BLD: 3.43 M/UL — LOW (ref 4.2–5.8)
RBC # FLD: 17.9 % — HIGH (ref 10.3–14.5)
RH IG SCN BLD-IMP: POSITIVE — SIGNIFICANT CHANGE UP
WBC # BLD: 3.13 K/UL — LOW (ref 3.8–10.5)
WBC # FLD AUTO: 3.13 K/UL — LOW (ref 3.8–10.5)

## 2022-12-27 PROCEDURE — 86900 BLOOD TYPING SEROLOGIC ABO: CPT

## 2022-12-27 PROCEDURE — 93656 COMPRE EP EVAL ABLTJ ATR FIB: CPT

## 2022-12-27 PROCEDURE — 86901 BLOOD TYPING SEROLOGIC RH(D): CPT

## 2022-12-27 PROCEDURE — 85027 COMPLETE CBC AUTOMATED: CPT

## 2022-12-27 PROCEDURE — C1732: CPT

## 2022-12-27 PROCEDURE — 93657 TX L/R ATRIAL FIB ADDL: CPT

## 2022-12-27 PROCEDURE — 86850 RBC ANTIBODY SCREEN: CPT

## 2022-12-27 PROCEDURE — C1769: CPT

## 2022-12-27 PROCEDURE — 36415 COLL VENOUS BLD VENIPUNCTURE: CPT

## 2022-12-27 PROCEDURE — 93623 PRGRMD STIMJ&PACG IV RX NFS: CPT

## 2022-12-27 PROCEDURE — C1760: CPT

## 2022-12-27 PROCEDURE — C1894: CPT

## 2022-12-27 PROCEDURE — C1766: CPT

## 2022-12-27 PROCEDURE — C1759: CPT

## 2022-12-27 PROCEDURE — 93005 ELECTROCARDIOGRAM TRACING: CPT

## 2022-12-27 RX ORDER — ACETAMINOPHEN 500 MG
650 TABLET ORAL EVERY 6 HOURS
Refills: 0 | Status: DISCONTINUED | OUTPATIENT
Start: 2022-12-27 | End: 2022-12-27

## 2022-12-27 RX ORDER — CHOLECALCIFEROL (VITAMIN D3) 125 MCG
1 CAPSULE ORAL
Qty: 0 | Refills: 0 | DISCHARGE

## 2022-12-27 RX ORDER — RUXOLITINIB 25 MG/1
1 TABLET ORAL
Qty: 0 | Refills: 0 | DISCHARGE

## 2022-12-27 RX ORDER — PANTOPRAZOLE SODIUM 20 MG/1
1 TABLET, DELAYED RELEASE ORAL
Qty: 84 | Refills: 0
Start: 2022-12-27 | End: 2023-02-06

## 2022-12-27 RX ORDER — ACETAMINOPHEN 500 MG
2 TABLET ORAL
Qty: 0 | Refills: 0 | DISCHARGE
Start: 2022-12-27

## 2022-12-27 RX ORDER — METOPROLOL TARTRATE 50 MG
12.5 TABLET ORAL DAILY
Refills: 0 | Status: DISCONTINUED | OUTPATIENT
Start: 2022-12-27 | End: 2022-12-27

## 2022-12-27 RX ORDER — CHOLECALCIFEROL (VITAMIN D3) 125 MCG
1000 CAPSULE ORAL DAILY
Refills: 0 | Status: DISCONTINUED | OUTPATIENT
Start: 2022-12-27 | End: 2022-12-27

## 2022-12-27 RX ORDER — COLCHICINE 0.6 MG
1 TABLET ORAL
Qty: 7 | Refills: 0
Start: 2022-12-27 | End: 2023-01-02

## 2022-12-27 RX ORDER — DILTIAZEM HCL 120 MG
1 CAPSULE, EXT RELEASE 24 HR ORAL
Qty: 0 | Refills: 0 | DISCHARGE

## 2022-12-27 RX ORDER — PANTOPRAZOLE SODIUM 20 MG/1
40 TABLET, DELAYED RELEASE ORAL EVERY 12 HOURS
Refills: 0 | Status: DISCONTINUED | OUTPATIENT
Start: 2022-12-27 | End: 2022-12-27

## 2022-12-27 RX ORDER — RIVAROXABAN 15 MG-20MG
20 KIT ORAL
Refills: 0 | Status: DISCONTINUED | OUTPATIENT
Start: 2022-12-27 | End: 2022-12-27

## 2022-12-27 RX ORDER — OMEGA-3 ACID ETHYL ESTERS 1 G
0 CAPSULE ORAL
Qty: 0 | Refills: 0 | DISCHARGE

## 2022-12-27 RX ORDER — OXYMETAZOLINE HYDROCHLORIDE 0.5 MG/ML
2 SPRAY NASAL
Qty: 0 | Refills: 0 | DISCHARGE

## 2022-12-27 RX ORDER — UBIDECARENONE 100 MG
0 CAPSULE ORAL
Qty: 0 | Refills: 0 | DISCHARGE

## 2022-12-27 RX ORDER — RIVAROXABAN 15 MG-20MG
1 KIT ORAL
Qty: 0 | Refills: 0 | DISCHARGE

## 2022-12-27 RX ORDER — METOPROLOL TARTRATE 50 MG
1 TABLET ORAL
Qty: 0 | Refills: 0 | DISCHARGE

## 2022-12-27 RX ORDER — COLCHICINE 0.6 MG
0.6 TABLET ORAL DAILY
Refills: 0 | Status: DISCONTINUED | OUTPATIENT
Start: 2022-12-27 | End: 2022-12-27

## 2022-12-27 RX ORDER — HYDROXYZINE HCL 10 MG
3 TABLET ORAL
Qty: 0 | Refills: 0 | DISCHARGE

## 2022-12-27 RX ADMIN — PANTOPRAZOLE SODIUM 40 MILLIGRAM(S): 20 TABLET, DELAYED RELEASE ORAL at 17:57

## 2022-12-27 RX ADMIN — Medication 0.6 MILLIGRAM(S): at 17:57

## 2022-12-27 RX ADMIN — RIVAROXABAN 20 MILLIGRAM(S): KIT at 18:57

## 2022-12-27 NOTE — H&P CARDIOLOGY - NS ATTEND AMEND GEN_ALL_CORE FT
Patient seen at bedside in [facility] this morning. Wife present. We had an extensive conversation about the details of how a catheter ablation for AF is done including details regarding and invasive electrophysiology study and the risks of a catheter ablation (including, but not limited to, bleeding, perforation, CVA, MI, damage to the esophagus, valves, conduction system, phrenic nerve). Consent signed and in the front of the chart. Hb: 9.87 today (was 9/2 on 12/22).    TERE Cowart

## 2022-12-27 NOTE — H&P CARDIOLOGY - NSICDXPASTSURGICALHX_GEN_ALL_CORE_FT
PAST SURGICAL HISTORY:  H/O partial nephrectomy right-12/17/12    Left varicocele     Melanoma in situ of back x1-1983, [id number]    Mohs defect mohs procedure bilateral sides of nose with skin graft from clavicular areas 7/2016    S/P cystoscopy TURBT-05/2012    S/P cystoscopy 5/15/14

## 2022-12-27 NOTE — H&P CARDIOLOGY - HISTORY OF PRESENT ILLNESS
64 y/o male with PMH A. Fib on xarelto, polycythemia vera, HTN and bladder cancer pt s/p TURBT 2019, partial nephrectomy 2012.  Pt complain of increase in urinary frequency x several years.  Pt presents with preop Dx overactive bladder now scheduled for Cystoscopy with Botox on 12/17/19. This is a 66 y/o  male with no known implantable devices noted with COVID 19 negative Vaccinated x 2 with Moderna and Booster x 2 with PMHX A. Fib on Xarelto last dose on 12/26/22 PM ,  Polycythemia Vera ( on Jakafi )  HTN and bladder cancer pt s/p TURBT 2019, BPH ,partial nephrectomy 2012.  Pt complain of increase in urinary frequency x several years.  Pt seen by Cardiologist Dr. Virgilio Justice . Pt Paroxsymal AFIB was initial detected in the post operative period following a right laparoscopic partial nephrectomy in 2013 . Found to be in AFib in 2018 . Pt with recent feelings of lightheadedness and fatigue . Pt has maintained on Xarelto , Diltiazem and Toprol . He denies any CP, SOB or Syncope. He also reports episodes of palpitations described as "racing heart" . Pt had recent Echo 7/22 showed a normal LV . Now presents today for scheduled AFIB Ablation with Dr. Cowart.     < from: Transthoracic Echocardiogram w/Doppler (12.19.12 @ 09:19) >  CONCLUSIONS:  1. Mild-moderate mitral regurgitation.  2. Normal trileaflet aortic valve.  3. Endocardium not well visualized; Hyperdynamic left  ventricle. EF > 75%  *** No previous Echo exam.  ------------------------------------------------------------------------  Confirmed on  12/19/2012 - 17:15:25 by Lori Galan MD  ------------------------------------------------------------------------    < end of copied text >    This is a 66 y/o  male with no known implantable devices noted with COVID 19 negative Vaccinated x 2 with Moderna and Booster x 2 with PMHX A. Fib on Xarelto last dose on 12/26/22 PM ,  Polycythemia Vera ( on Jakafi )  HTN and bladder cancer pt s/p TURBT 2019, BPH , newly dx SHRAVAN Moderate ( waiting for CPAP machine ) partial nephrectomy 2012.  Pt complain of increase in urinary frequency x several years.  Pt seen by Cardiologist Dr. Virgilio Justice . Pt Paroxsymal AFIB was initial detected in the post operative period following a right laparoscopic partial nephrectomy in 2013 . Found to be in AFib in 2018 . Pt with recent feelings of lightheadedness and fatigue . Pt has maintained on Xarelto , Diltiazem and Toprol . He denies any CP, SOB or Syncope. He also reports episodes of palpitations described as "racing heart" . Pt had recent Echo 7/22 showed a normal LV . Now presents today for scheduled AFIB Ablation with Dr. Cowart.  Currently CP free no acute distress noted.     < from: Transthoracic Echocardiogram w/Doppler (12.19.12 @ 09:19) >  CONCLUSIONS:  1. Mild-moderate mitral regurgitation.  2. Normal trileaflet aortic valve.  3. Endocardium not well visualized; Hyperdynamic left  ventricle. EF > 75%  *** No previous Echo exam.  ------------------------------------------------------------------------  Confirmed on  12/19/2012 - 17:15:25 by Lori Galan MD  ------------------------------------------------------------------------    < end of copied text >    This is a 66 y/o  male with no known implantable devices noted with COVID 19 negative Vaccinated x 2 with Moderna and Booster x 2 with PMHX A. Fib on Xarelto last dose on 12/26/22 PM ,  Polycythemia Vera ( on Jakafi )  HTN and bladder cancer pt s/p TURBT 2019, BPH , newly dx SHRAVAN Moderate ( waiting for CPAP machine ) partial nephrectomy 2012.  Pt complain of increase in urinary frequency x several years.  Pt seen by Cardiologist Dr. Virgilio Justice . Pt Paroxsymal AFIB was initial detected in the post operative period following a right laparoscopic partial nephrectomy in 2013 . Found to be in AFib in 2018 . Pt with recent feelings of lightheadedness and fatigue . Pt has maintained on Xarelto , Diltiazem and Toprol . He denies any CP, SOB or Syncope. He also reports episodes of palpitations described as "racing heart" . Pt had recent Echo 7/22 showed a normal LV . Now presents today for scheduled AFIB Ablation with Dr. Cowart.  Currently CP free no acute distress noted.

## 2022-12-27 NOTE — ASU DISCHARGE PLAN (ADULT/PEDIATRIC) - PROVIDER TOKENS
PROVIDER:[TOKEN:[[id number]],SCHEDULEDAPPT:[02/10/2023],SCHEDULEDAPPTTIME:[10:45 AM],ESTABLISHEDPATIENT:[T]]

## 2022-12-27 NOTE — ASU DISCHARGE PLAN (ADULT/PEDIATRIC) - CARE PROVIDER_API CALL
Baldemar Cowart (MD)  Cardiac Electrophysiology; Cardiovascular Disease; Internal Medicine  [address] [place] [address]  Phone: [phone]  Fax: [phone]  Established Patient  Scheduled Appointment: 02/10/2023 10:45 AM

## 2022-12-27 NOTE — H&P CARDIOLOGY - OTHER
TTE from 7/26/2022: LA: 4.29, LASHAUN: 28.1, normal global LVSF, LVEF: 60-65%, normal diastolic filling, normal RV size and function, mild RA dilation, no pericardial effusion, trace MR, trivial TR

## 2022-12-27 NOTE — ASU DISCHARGE PLAN (ADULT/PEDIATRIC) - NS MD DC FALL RISK RISK
For information on Fall & Injury Prevention, visit: [email or web address] OR  [email or web address] OR  https://www.cdc.gov/steadi/patient.html

## 2022-12-27 NOTE — CHART NOTE - NSCHARTNOTEFT_GEN_A_CORE
12/27 s/p AFIB/AFLutter ablation via RFV with Vascade x 2 with no hematoma/bleeding    Vital Signs Last 24 Hrs  T(C): 36.3 (27 Dec 2022 14:10), Max: 36.3 (27 Dec 2022 06:39)  T(F): 97.4 (27 Dec 2022 14:10), Max: 97.4 (27 Dec 2022 14:10)  HR: 61 (27 Dec 2022 14:55) (61 - 73)  BP: 104/61 (27 Dec 2022 14:55) (101/59 - 134/76)  BP(mean): 95 (27 Dec 2022 06:39) (95 - 95)  RR: 16 (27 Dec 2022 14:55) (16 - 18)  SpO2: 97% (27 Dec 2022 14:55) (96% - 100%)    Parameters below as of 27 Dec 2022 14:55  Patient On (Oxygen Delivery Method): room air    Tele NSR 60     IVF 2L voided 300cc x 2 in PACU  s/p General Anesthesia with Midazolam and 100 Fentanyl; Decadron 8 and Zofran 8 came in groggy but now arouses easily and oriented.  He is stable with no discomfort.     Plan:  - Recover in [facility] after recovered from Anesthesia  - Bedrest 2 hours till 4:00 pm  - Xarelto at 7pm  - D/C diltiazem  - Continue tele  - Pt may d/c 6 hours post procedure at 8PM if stable and agrees to be d/c home  - Colchicine 0.6mg daily x 7 days and Protonix 40mg BID x 42 days (6 weeks) hold Statin while for 1 week while on Colchicine.  (sent to his home pharmacy.    - Endorsed to Nurys Barone NP in [facility]  - Pt to transfer when fully recovered to [facility] bed 4    JOSE ROBERTO Bravo-BC  Cardiology

## 2022-12-27 NOTE — H&P CARDIOLOGY - NS ATTEND OPT1 GEN_ALL_CORE
CM reviewed chart for d/c planning. Alert, oriented from home. Pt needs home oxygen per RT eval. Will notify Ashley Coelho with [address] [place] [address].       Michelle Obando RN, BSN  [phone] I attest my time as attending is greater than 50% of the total combined time spent on qualifying patient care activities by the PA/NP and attending.

## 2022-12-28 ENCOUNTER — NON-APPOINTMENT (OUTPATIENT)
Age: 67
End: 2022-12-28

## 2022-12-29 RX ORDER — METOPROLOL TARTRATE 50 MG
0.5 TABLET ORAL
Qty: 0 | Refills: 0 | DISCHARGE

## 2022-12-29 RX ORDER — DILTIAZEM HCL 120 MG
1 CAPSULE, EXT RELEASE 24 HR ORAL
Qty: 0 | Refills: 0 | DISCHARGE

## 2022-12-29 RX ORDER — RUXOLITINIB 25 MG/1
1 TABLET ORAL
Qty: 0 | Refills: 0 | DISCHARGE

## 2022-12-29 RX ORDER — ROSUVASTATIN CALCIUM 5 MG/1
1 TABLET ORAL
Qty: 0 | Refills: 0 | DISCHARGE

## 2022-12-30 ENCOUNTER — NON-APPOINTMENT (OUTPATIENT)
Age: 67
End: 2022-12-30

## 2023-01-03 ENCOUNTER — NON-APPOINTMENT (OUTPATIENT)
Age: 68
End: 2023-01-03

## 2023-01-03 ENCOUNTER — APPOINTMENT (OUTPATIENT)
Dept: CARDIOLOGY | Facility: CLINIC | Age: 68
End: 2023-01-03
Payer: COMMERCIAL

## 2023-01-03 VITALS
HEART RATE: 125 BPM | OXYGEN SATURATION: 98 % | TEMPERATURE: 97.7 F | DIASTOLIC BLOOD PRESSURE: 70 MMHG | WEIGHT: 214 LBS | BODY MASS INDEX: 28.23 KG/M2 | SYSTOLIC BLOOD PRESSURE: 120 MMHG

## 2023-01-03 PROCEDURE — 93000 ELECTROCARDIOGRAM COMPLETE: CPT

## 2023-01-03 PROCEDURE — 99495 TRANSJ CARE MGMT MOD F2F 14D: CPT

## 2023-01-03 RX ORDER — DILTIAZEM HYDROCHLORIDE 120 MG/1
120 CAPSULE, EXTENDED RELEASE ORAL DAILY
Qty: 90 | Refills: 1 | Status: DISCONTINUED | COMMUNITY
Start: 2018-12-12 | End: 2023-01-03

## 2023-01-03 NOTE — DISCUSSION/SUMMARY
[FreeTextEntry1] : PAF: S/p ablation, intermittently feeling well and other times not. Afib today\par \par On Metoprolol 12.5mg BID \par Continue Xarelto\par Can consider adding amio, adding back dig, increasing metoprolol, reached out to EP for input \par SHRAVAN eval ongoing with pulmonary \par Adequate hydration \par \par Address anemia with heme \par \par HTN: Good today, cont Toprol\par \par HLD: Markedly improved on Crestor. Cont meds, trend labs \par \par Mild CAD: Cont statin, on Xarelto \par \par RV 3M  [EKG obtained to assist in diagnosis and management of assessed problem(s)] : EKG obtained to assist in diagnosis and management of assessed problem(s) [phone]

## 2023-01-03 NOTE — HISTORY OF PRESENT ILLNESS
[FreeTextEntry1] : 67M with pAF, HTN P. Vera on Jakafi who presents for follow up s/p ablation\par \par Pt s/p PVI, CTI ablation for AFib 12/27/22\par Some days patient feels great, goes out for a walk but then feels very tired\par Other days, he feels extremely weak and tired \par Today he feels unwell\par Back in AFib today to 125 \par Heme is adjusting Jakafi dose \par Occasional chest tightness with exhalation\par On colchicine, PPI \par Last Hb 9.5 \par On Xarelto, no bleeding issues\par Cardizem d/c'ed\par \par HISTORY:\par \par Generally cannot tell when he is in Afib\par Had AFib post op 2013, short course of asa which was stopped. Subsequently noted to be in Afib in 2018, started on Xarelto. No bleeding complications from Xarelto \par Had been on Cardizem first, then metoprolol was added due to poorly controlled HR\par \par Never smoker. Father had CAD later in life. Works as a  for [employer]. \par \par ECG: Sinus bradycardia at 49\par TTE 2021: 60-65%, mild MAC, mild MR\par NST: EF 55%, no ischemia\par ZIO: 16% AF burden, NSVT\par CTA: CAC score 21, 20% LCx, short mid LAD bridge, small pericardial effusion \par \par Xarelto 20mg \par (Rosuvastatin 5mg)\par \par Toprol 12.5mg\par Lasix 20mg prn

## 2023-01-04 ENCOUNTER — NON-APPOINTMENT (OUTPATIENT)
Age: 68
End: 2023-01-04

## 2023-01-09 DIAGNOSIS — G47.33 OBSTRUCTIVE SLEEP APNEA (ADULT) (PEDIATRIC): ICD-10-CM

## 2023-01-11 ENCOUNTER — TRANSCRIPTION ENCOUNTER (OUTPATIENT)
Age: 68
End: 2023-01-11

## 2023-01-11 ENCOUNTER — NON-APPOINTMENT (OUTPATIENT)
Age: 68
End: 2023-01-11

## 2023-01-11 ENCOUNTER — APPOINTMENT (OUTPATIENT)
Dept: PULMONOLOGY | Facility: CLINIC | Age: 68
End: 2023-01-11

## 2023-01-13 ENCOUNTER — APPOINTMENT (OUTPATIENT)
Dept: CARDIOLOGY | Facility: CLINIC | Age: 68
End: 2023-01-13
Payer: COMMERCIAL

## 2023-01-13 PROCEDURE — 93015 CV STRESS TEST SUPVJ I&R: CPT

## 2023-01-13 PROCEDURE — 99214 OFFICE O/P EST MOD 30 MIN: CPT | Mod: 25

## 2023-01-19 NOTE — BRIEF OPERATIVE NOTE - BRIEF OP NOTE DRAINS
[FreeTextEntry1] : Patient with no chest pain. No sob. No Carranza. He lost 10 llbs . Goes gym 3 times a weight. He lifts weight . Treadmill 30 minutes. Bloods reviewed.. Hemoglobin A1c 6.2.
16f oates

## 2023-01-20 ENCOUNTER — TRANSCRIPTION ENCOUNTER (OUTPATIENT)
Age: 68
End: 2023-01-20

## 2023-02-02 ENCOUNTER — NON-APPOINTMENT (OUTPATIENT)
Age: 68
End: 2023-02-02

## 2023-02-03 ENCOUNTER — APPOINTMENT (OUTPATIENT)
Dept: ELECTROPHYSIOLOGY | Facility: CLINIC | Age: 68
End: 2023-02-03
Payer: COMMERCIAL

## 2023-02-03 ENCOUNTER — NON-APPOINTMENT (OUTPATIENT)
Age: 68
End: 2023-02-03

## 2023-02-03 VITALS
BODY MASS INDEX: 29.16 KG/M2 | RESPIRATION RATE: 14 BRPM | OXYGEN SATURATION: 100 % | SYSTOLIC BLOOD PRESSURE: 144 MMHG | HEIGHT: 73 IN | WEIGHT: 220 LBS | HEART RATE: 50 BPM | DIASTOLIC BLOOD PRESSURE: 78 MMHG

## 2023-02-03 PROCEDURE — 99214 OFFICE O/P EST MOD 30 MIN: CPT | Mod: 25

## 2023-02-03 PROCEDURE — 93000 ELECTROCARDIOGRAM COMPLETE: CPT

## 2023-02-03 RX ORDER — PREDNISONE 10 MG/1
10 TABLET ORAL
Qty: 40 | Refills: 0 | Status: DISCONTINUED | COMMUNITY
Start: 2022-10-21 | End: 2023-02-03

## 2023-02-03 RX ORDER — FAMOTIDINE 40 MG/1
40 TABLET, FILM COATED ORAL DAILY
Qty: 7 | Refills: 3 | Status: DISCONTINUED | COMMUNITY
Start: 2022-10-21 | End: 2023-02-03

## 2023-02-03 RX ORDER — IPRATROPIUM BROMIDE 42 UG/1
0.06 SPRAY NASAL 3 TIMES DAILY
Qty: 45 | Refills: 0 | Status: DISCONTINUED | COMMUNITY
Start: 2021-11-15 | End: 2023-02-03

## 2023-02-03 RX ORDER — PREDNISONE 20 MG/1
20 TABLET ORAL DAILY
Qty: 10 | Refills: 0 | Status: DISCONTINUED | COMMUNITY
Start: 2021-11-10 | End: 2023-02-03

## 2023-02-03 RX ORDER — ADHESIVE TAPE 3"X 2.3 YD
50 MCG TAPE, NON-MEDICATED TOPICAL
Qty: 100 | Refills: 0 | Status: DISCONTINUED | COMMUNITY
End: 2023-02-03

## 2023-02-03 RX ORDER — BENZONATATE 200 MG/1
200 CAPSULE ORAL 3 TIMES DAILY
Qty: 15 | Refills: 0 | Status: DISCONTINUED | COMMUNITY
Start: 2022-10-19 | End: 2023-02-03

## 2023-02-03 RX ORDER — FLUTICASONE FUROATE 100 UG/1
100 POWDER RESPIRATORY (INHALATION)
Qty: 30 | Refills: 5 | Status: DISCONTINUED | COMMUNITY
Start: 2022-10-21 | End: 2023-02-03

## 2023-02-03 NOTE — CARDIOLOGY SUMMARY
[de-identified] : ECG from 2/3/2023: Sinus bradycardia with a ventricular rate of 52 beats per minute\par ECG from 10/14/2022: Sinus bradycardia at 56 bpm\par ECG from 6/23/2022: Sinus elaine @ 55bpm, normal axis, normal intervals [de-identified] : Zio XT from 10/14/2022-10/28/2022: min HR: 40, max HR: 235, mean HR: 65, AF burden 10%. Periods documented as non-sustained VT likely AF with aberrancy. There were no episodes listed as NSVT in sinus rhythm. Symptoms correlated with both sinus rhythm and with atrial fibrillation. Ventricular rates in AF not well rate controlled (HR range in AF , mean 105)\par \par Zio XT from 8/11/2021-8/18/2021: min HR: 36, mean HR: 69, max HR: 235, 120 episodes of NSVT - HR range 129-135bpm, fastest 6 beats 235bpm, longest 15 beats at 154bpm, AF burden 16% - longest episode of AF 12 hours, 37 mins, mean HR in AF: 99bpm [de-identified] : Stress test from 1/13/2023: duration 9:58, 11.5METS, max HR: 117 (75% of MPHR), no changes in QRS duration on my review of the ECG strips in [employer] [de-identified] : TTE from 7/26/2022: LA: 4.29, LASHAUN: 28.1, normal global LVSF, LVEF: 60-65%, normal diastolic filling, normal RV size and function, mild RA dilation, no pericardial effusion, trace MR, trivial TR\par \par TTE from 8/13/2021: EF: 60-65%, LA: 4.25, LASHAUN: 28.7 [de-identified] : CT Angiography Coronary from 9/13/2021: short segment superficial myocardial bridging in the mid LAD without stenosis, mild burden of coronary calcium, score 21.2 (34th percentile), small pericardial effusion, mildly dilated PA [de-identified] : Ablation from 12/27/2022: Successful PVI with entrance and exit block demonstrated in each of the PVs / CTI-dependent right atrial flutter was spontaneously induced, successful CTI ablation with bidirectional block achieved across the lesion set  [de-identified] : Sleep study from 11/2/2022: moderate SHRAVAN with a pAHI of 19.1/hr

## 2023-02-03 NOTE — DISCUSSION/SUMMARY
[Paroxysmal Atrial Fibrillation] : paroxysmal atrial fibrillation [FreeTextEntry1] : Mr. Prasanna Edwards is a 67 year old man with pAF, HTN, moderate SHRAVAN (using CPAP) and polycythemia vera (on Jakafi). He presents today for follow-up after a the ablation 12/27/2022 (PVI and CTI). He has a recurrence of paroxysmal AF in the blanking period and was started on standing Flecainide. \par \par He has not had any recurrences of his symptoms while on Flecainide. I recommended that he stop Flecainide once his is 3 months post ablation. He is considering taking this medication for a longer time period. I told him we could also stop Flecainide as I suggested and if he has a recurrence of AF that does not spontaneously terminate then we could use Flecainide as a "pill in the pocket". This first dose (at the higher dose) would need to be administered in a monitored setting. Continue Metoprolol while on Flecainide.\par \par He also inquired about anticoagulation use. We discussed that a decision to stop anticoagulation following a catheter ablation of atrial fibrillation is driven by the CHADS2-VASC score. His CHADS2-VASC is 2 (Age: 1, HTN: 1). He will remain on anticoagulation for the time being. \par \par I advised him to follow-up with me in 6 months or earlier if he has a recurrence of atrial fibrillation. [EKG obtained to assist in diagnosis and management of assessed problem(s)] : EKG obtained to assist in diagnosis and management of assessed problem(s)

## 2023-02-03 NOTE — PHYSICAL EXAM
[Well Developed] : well developed [Well Nourished] : well nourished [No Acute Distress] : no acute distress [Normal Venous Pressure] : normal venous pressure [No Carotid Bruit] : no carotid bruit [Normal S1, S2] : normal S1, S2 [No Murmur] : no murmur [No Rub] : no rub [No Gallop] : no gallop [Clear Lung Fields] : clear lung fields [Good Air Entry] : good air entry [No Respiratory Distress] : no respiratory distress  [Soft] : abdomen soft [Non Tender] : non-tender [No Masses/organomegaly] : no masses/organomegaly [Normal Bowel Sounds] : normal bowel sounds [Normal Gait] : normal gait [No Edema] : no edema [No Cyanosis] : no cyanosis [No Rash] : no rash [Moves all extremities] : moves all extremities [No Focal Deficits] : no focal deficits [Normal Speech] : normal speech [Alert and Oriented] : alert and oriented [Normal memory] : normal memory

## 2023-02-03 NOTE — HISTORY OF PRESENT ILLNESS
[FreeTextEntry1] : Mr. Prasanna Edwards is a 67 year old man with paroxysmal atrial fibrillation, hypertension, moderate obstructive sleep apnea (using CPAP) and polycythemia vera (on Jakafi). He presents today for follow-up after a catheter ablation of atrial fibrillation on 12/27/2022.\par \par To summarize his history, he atrial fibrillation was initial detected in the post-operative period following a right laproscopic partial nephrectomy in 2013. He was subsequently found to be in atrial fibrillation in 2018.  His AF burden from 2021 was 16%. His symptoms included lightheadedness and fatigue. His ablation on 12/27/2022 included pulmonary vein isolation and ablation to treat inducible cavotricuspid isthmus dependent atrial flutter. Following his ablation he had a recurrence of atrial fibrillation during the blanking period. He presented for a cardioversion and was found to be in sinus rhythm. Flecainide was started. He underwent an ECG stress test while on Flecainide. QRS widening was not noted during exercise. \par \par Mr. Edwards presents today for follow-up. Since starting Flecainide he has not had any episodes of atrial fibrillation. He has an [employer] watch. He also denies feeling dizzy. No shortness of breath or other complaints. He is currently using CPAP (other than on the Sabbath) but is exploring an oral appliance as well. His dose of Jakafi has been reduced further by his Hematologist based on his hemoglobin levels.

## 2023-02-13 ENCOUNTER — APPOINTMENT (OUTPATIENT)
Dept: UROLOGY | Facility: CLINIC | Age: 68
End: 2023-02-13

## 2023-02-13 ENCOUNTER — OUTPATIENT (OUTPATIENT)
Dept: OUTPATIENT SERVICES | Facility: HOSPITAL | Age: 68
LOS: 1 days | End: 2023-02-13
Payer: COMMERCIAL

## 2023-02-13 ENCOUNTER — APPOINTMENT (OUTPATIENT)
Dept: UROLOGY | Facility: CLINIC | Age: 68
End: 2023-02-13
Payer: COMMERCIAL

## 2023-02-13 ENCOUNTER — NON-APPOINTMENT (OUTPATIENT)
Age: 68
End: 2023-02-13

## 2023-02-13 DIAGNOSIS — N40.0 BENIGN PROSTATIC HYPERPLASIA WITHOUT LOWER URINARY TRACT SYMPMS: ICD-10-CM

## 2023-02-13 DIAGNOSIS — Z98.89 OTHER SPECIFIED POSTPROCEDURAL STATES: Chronic | ICD-10-CM

## 2023-02-13 DIAGNOSIS — M95.9 ACQUIRED DEFORMITY OF MUSCULOSKELETAL SYSTEM, UNSPECIFIED: Chronic | ICD-10-CM

## 2023-02-13 DIAGNOSIS — R35.0 FREQUENCY OF MICTURITION: ICD-10-CM

## 2023-02-13 PROCEDURE — 52000 CYSTOURETHROSCOPY: CPT

## 2023-02-13 PROCEDURE — 76775 US EXAM ABDO BACK WALL LIM: CPT | Mod: 26

## 2023-02-13 PROCEDURE — 76775 US EXAM ABDO BACK WALL LIM: CPT

## 2023-02-14 LAB — URINE CYTOLOGY: NORMAL

## 2023-02-15 DIAGNOSIS — Z85.51 PERSONAL HISTORY OF MALIGNANT NEOPLASM OF BLADDER: ICD-10-CM

## 2023-02-17 ENCOUNTER — NON-APPOINTMENT (OUTPATIENT)
Age: 68
End: 2023-02-17

## 2023-02-22 ENCOUNTER — APPOINTMENT (OUTPATIENT)
Dept: PULMONOLOGY | Facility: CLINIC | Age: 68
End: 2023-02-22

## 2023-03-04 ENCOUNTER — FORM ENCOUNTER (OUTPATIENT)
Age: 68
End: 2023-03-04

## 2023-03-06 ENCOUNTER — APPOINTMENT (OUTPATIENT)
Dept: PULMONOLOGY | Facility: CLINIC | Age: 68
End: 2023-03-06
Payer: COMMERCIAL

## 2023-03-06 VITALS
WEIGHT: 217 LBS | HEIGHT: 73 IN | OXYGEN SATURATION: 98 % | BODY MASS INDEX: 28.76 KG/M2 | HEART RATE: 69 BPM | TEMPERATURE: 97.5 F | DIASTOLIC BLOOD PRESSURE: 70 MMHG | SYSTOLIC BLOOD PRESSURE: 135 MMHG | RESPIRATION RATE: 15 BRPM

## 2023-03-06 DIAGNOSIS — G47.33 OBSTRUCTIVE SLEEP APNEA (ADULT) (PEDIATRIC): ICD-10-CM

## 2023-03-06 PROCEDURE — 99215 OFFICE O/P EST HI 40 MIN: CPT

## 2023-03-06 NOTE — PHYSICAL EXAM
[General Appearance - Well Developed] : well developed [Normal Appearance] : normal appearance [Well Groomed] : well groomed [General Appearance - Well Nourished] : well nourished [No Deformities] : no deformities [General Appearance - In No Acute Distress] : no acute distress [Normal Conjunctiva] : the conjunctiva exhibited no abnormalities [Eyelids - No Xanthelasma] : the eyelids demonstrated no xanthelasmas [Low Lying Soft Palate] : low lying soft palate [Enlarged Base of the Tongue] : enlargement of the base of the tongue [II] : II [Heart Rate And Rhythm] : heart rate was normal and rhythm regular [Heart Sounds] : normal S1 and S2 [Respiration, Rhythm And Depth] : normal respiratory rhythm and effort [Exaggerated Use Of Accessory Muscles For Inspiration] : no accessory muscle use [Auscultation Breath Sounds / Voice Sounds] : lungs were clear to auscultation bilaterally [Abnormal Walk] : normal gait [Motor Tone] : muscle strength and tone were normal [Nail Clubbing] : no clubbing of the fingernails [Cyanosis, Localized] : no localized cyanosis [Skin Color & Pigmentation] : normal skin color and pigmentation [Cranial Nerves] : cranial nerves 2-12 were intact [No Focal Deficits] : no focal deficits [Oriented To Time, Place, And Person] : oriented to person, place, and time [Impaired Insight] : insight and judgment were intact [Neck Appearance] : the appearance of the neck was normal [] : the neck was supple

## 2023-03-06 NOTE — CONSULT LETTER
[Dear  ___] : Dear  [unfilled], [Please see my note below.] : Please see my note below. [Consult Closing:] : Thank you very much for allowing me to participate in the care of this patient.  If you have any questions, please do not hesitate to contact me. [Sincerely,] : Sincerely, [DrNegro  ___] : Dr. GROVES [Courtesy Letter:] : I had the pleasure of seeing your patient, [unfilled], in my office today. [FreeTextEntry2] : Dr. Baldemar Cowart [FreeTextEntry3] : Loretta Kelly MD, FAASM\par  of Medicine\par Associate , Fellowship in Pulmonary and Critical Care Medicine\par Division of Pulmonary, Critical Care & Sleep Medicine\par [employer] [facility]\par \par \par

## 2023-03-06 NOTE — ASSESSMENT
[FreeTextEntry1] : 66 yo M with moderate SHRAVAN, Afib s/p ablation and currently being maintained on 3 months of Flecanide who presents for follow up after initiating APAP. \malik Started APAP on 1/11/23- using 6-7 nights/week and states he is deriving benefit, more energetic, exercising. Continues to wake up to urinate. Feeling claustrophobic with mask.\malik Underwent EP ablation 12/27/22 and the week after experienced Afib recurrence and was started on Flecainide\malik Is also being evaluated for oral appliance therapy with Dr. Ly's office as he has difficulty tolerating CPAP therapy and he finds it preferable to use an oral appliance. \par Review of data for compliance and therapy shows adequate usage and normalization of AHI with APAP use. Significant mask leakage. \par \par Plan:\par He has a mask fitting scheduled today in our office. \par Discussed APAP machine and supply maintenance\par Follow up in 6 months or earlier as needed

## 2023-03-06 NOTE — PROCEDURE
[FreeTextEntry1] : MASK FITTING:\par \par Patient is currently using a F+P Vitera full face mask which he finds very uncomfortable and reports excessive leak. He is an obligate mouth breather and cannot tolerate nasal masks, which he tried during his titration study.\par \par Patient was fitted today with a Resmed Airfit F30i Standard w/ Small cushion and a F+P Rasta Full XS full face mask. Both masks appeared to have a good fit. Patient felt that they both blocked his nares and made it difficult to breathe through his nose, but he was also unwilling to try another full face mask which would cover his nose. He says he is also pursuing oral appliance therapy.\par \par Patient was instructed on how to don/doff and adjust both masks as well as connect them to his machine. He was advised to try both masks at home and follow up in 1-2 weeks so his preferred mask can be order from his DME.\par \par Time spent w/ patient - 30 minutes

## 2023-03-06 NOTE — HISTORY OF PRESENT ILLNESS
[Obstructive Sleep Apnea] : obstructive sleep apnea [FreeTextEntry1] : 66 yo M with moderate SHRAVAN, Afib, P vera on APAP therapy presents for follow up visit and mask fitting. \par \par 11/8/2022 -Initial evaluation of sleep disordered breathing\par Referred by Dr. Cowart. \par PMH: paroxysmal Afib, HTN, P. vera, bladder cancer s/p resection. \par Undergoing Zio patch 2 week evaluation of Afib and relationship to his episodes of dizziness. \par \par Watchpat HST 11/2/22: moderate SHRAVAN AHI 19.1/hr, T<90 of 0.1%\par \par Sleep history: \par +snoring\par Bed: 12-2 am, no difficultly falling asleep, wakes 3-x to urinate, out of bed at 8 am \par Has woken up feeling dizzy\par Unrefreshed upon awakening. \par Morning headaches:  denies\par Dry mouth/throat: denies\par Weight trend: stable\par Denies drowsy driving, denies any accidents or near accidents. \par EDS with ESS of 7\par \par Quality Metrics:\par Smoking history: denies\par ESS: 7\par \par Vaccines: \par COVID: Moderna x2 and 2 boosters\par Influenza: received in Oct 2022\par Pneumococcal: received last year\par \par APAP titration 11/28-12/5/22: \par AHI 6.6/hr, 90% pressure pressure 4 cmH2O\par \par Follow up OV 3/6/23:\par Started APAP on 1/11/23- using 6-7 nights/week and states he is deriving benefit, more energetic, exercising. Continues to wake up to urinate. Feeling claustrophobic with mask - has mask fitting scheduled today. \par Underwent EP ablation 12/27/22 and the week after experienced Afib recurrence and was started on Flecainide\par Is also being evaluated for oral appliance therapy with Dr. Ly's office as he has difficulty tolerating CPAP therapy. \par ESS 9 \par \par Data download for compliance and therapy:\par Machine: Resmed Airsense 10 auto\par DME: CSS\par Settings: APAP 4-12\par Mask: \par Percent days with usage: 93\par Average hours: 4 hours 57 min\par Treatment AHI: 2.9/hr\par Large leak: present the majority of the time

## 2023-03-06 NOTE — REVIEW OF SYSTEMS
[Nocturia] : nocturia [Negative] : Psychiatric [Difficulty Initiating Sleep] : no difficulty falling asleep [Difficulty Maintaining Sleep] : no difficulty maintaining sleep [Lower Extremity Discomfort] : no lower extremity discomfort [Unusual Sleep Behavior] : no unusual sleep behavior [Hypersomnolence] : not sleeping much more than usual [FreeTextEntry3] : per hpi [FreeTextEntry8] : per hpi [FreeTextEntry9] : per hpi [de-identified] : per hpi

## 2023-03-24 ENCOUNTER — APPOINTMENT (OUTPATIENT)
Dept: CARDIOLOGY | Facility: CLINIC | Age: 68
End: 2023-03-24
Payer: COMMERCIAL

## 2023-03-24 VITALS
DIASTOLIC BLOOD PRESSURE: 71 MMHG | SYSTOLIC BLOOD PRESSURE: 152 MMHG | HEART RATE: 56 BPM | BODY MASS INDEX: 28.63 KG/M2 | HEIGHT: 73 IN | WEIGHT: 216 LBS | OXYGEN SATURATION: 99 %

## 2023-03-24 VITALS — SYSTOLIC BLOOD PRESSURE: 115 MMHG | HEART RATE: 52 BPM | DIASTOLIC BLOOD PRESSURE: 60 MMHG

## 2023-03-24 PROCEDURE — 99214 OFFICE O/P EST MOD 30 MIN: CPT

## 2023-03-24 RX ORDER — FUROSEMIDE 20 MG/1
20 TABLET ORAL
Qty: 10 | Refills: 0 | Status: DISCONTINUED | COMMUNITY
End: 2023-03-24

## 2023-03-24 NOTE — HISTORY OF PRESENT ILLNESS
[FreeTextEntry1] : 67M with pAF s/p ablation 12/27/22, HTN, P. Vera on Jakafi\par \par Pt feeling very well as of late\par No further Afib episodes per watch \par No further lightheadedness\par Jakafi dose decreased and now Hb up to 10.8\par No palpitations, no lightheadedness \par On Xarelto, no bleeding issues\par Wearing CPAP \par \par HISTORY:\par \par Generally cannot tell when he is in Afib\par Had AFib post op 2013, short course of asa which was stopped. Subsequently noted to be in Afib in 2018, started on Xarelto. No bleeding complications from Xarelto \par Had been on Cardizem first, then metoprolol was added due to poorly controlled HR\par Pt s/p PVI, CTI ablation for AFib 12/27/22\par Had AFib recurrence post ablation, spontaneous converted to SR without any issues\par \par Never smoker. Father had CAD later in life. Works as a  for [employer]. \par \par ECG: Sinus bradycardia at 49\par TTE 2021: 60-65%, mild MAC, mild MR\par NST: EF 55%, no ischemia\par ZIO: 16% AF burden, NSVT\par CTA: CAC score 21, 20% LCx, short mid LAD bridge, small pericardial effusion \par \par Xarelto 20mg \par Rosuvastatin 5mg\par \par Toprol 12.5mg once a day\par Flecainide 50mg\par

## 2023-03-24 NOTE — DISCUSSION/SUMMARY
[FreeTextEntry1] : PAF: S/p ablation, doing much better. Has maintained SR\par \par Continue metoprolol 12.5mg daily\par Continue Xarelto\par Would dc flecainide once complete in early April \par \par HTN: Better on recheck, cont Toprol\par \par HLD: Markedly improved on Crestor. Cont meds, trend labs \par \par Mild CAD: Cont statin, on Xarelto \par \par RV 4M

## 2023-03-26 ENCOUNTER — RX RENEWAL (OUTPATIENT)
Age: 68
End: 2023-03-26

## 2023-03-27 ENCOUNTER — RX RENEWAL (OUTPATIENT)
Age: 68
End: 2023-03-27

## 2023-05-08 ENCOUNTER — RX RENEWAL (OUTPATIENT)
Age: 68
End: 2023-05-08

## 2023-06-01 NOTE — H&P PST ADULT - NSWEIGHTCALCTOOLDRUG_GEN_A_CORE
76 year old patient, known to have chronic back pain with herniated disk, bladder cancer s/p cystoscopy for malignant bladder tumor removal on 5/15, HLD, recent admission for acute CVA and small right suboccipital scalp hematoma, presented with acute chest pain of 1 day duration. Admitted to cardiac SDU.     #Chest pain likely HTN emergency   #HTN  - Laboratory workup wnl  - EKG showed inferior lateral ST depressions, q wave and minimal ST elevation in lead III.  - 's  - Troponin <0.01 and CKMB 2.7  - EP interrogated the loop recorder with no events.   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),  and left hypoesthesia, mild dysarthria  - C/w DAPT: aspirin 81 mg PO daily, Plavix 75 mg PO daily, Lipitor 80 mg  - ILR on 5/23/23  - PT/OT/SLP  - Neurology consult for clearance for cath and for AC    #Chronic bilateral Ptosis/ facial muscle weakness/ prox LE weakness  - possible underlying neuromuscular disorder  - can get w/u by Neuro as outpatient    #LLE swelling likely cellulitis vs DVT r/o  - Doppler r/o DVT  - start keflex today 6/1    #HLD   - LDL results: 190   - C/w Atorvastatin 80 mg daily    #Pre-DM - A1c 5.7% ---- DM education    Activity: PT  Diet: Easy to Chew DASH diet  DVT prophylaxis: heparin   GI ppx: PPI  used

## 2023-06-05 ENCOUNTER — RX RENEWAL (OUTPATIENT)
Age: 68
End: 2023-06-05

## 2023-07-07 ENCOUNTER — FORM ENCOUNTER (OUTPATIENT)
Age: 68
End: 2023-07-07

## 2023-07-07 ENCOUNTER — OUTPATIENT (OUTPATIENT)
Dept: OUTPATIENT SERVICES | Facility: HOSPITAL | Age: 68
LOS: 1 days | End: 2023-07-07
Payer: COMMERCIAL

## 2023-07-07 ENCOUNTER — APPOINTMENT (OUTPATIENT)
Dept: SLEEP CENTER | Facility: CLINIC | Age: 68
End: 2023-07-07
Payer: COMMERCIAL

## 2023-07-07 DIAGNOSIS — Z98.89 OTHER SPECIFIED POSTPROCEDURAL STATES: Chronic | ICD-10-CM

## 2023-07-07 DIAGNOSIS — M95.9 ACQUIRED DEFORMITY OF MUSCULOSKELETAL SYSTEM, UNSPECIFIED: Chronic | ICD-10-CM

## 2023-07-07 PROCEDURE — 95800 SLP STDY UNATTENDED: CPT | Mod: 26

## 2023-07-07 PROCEDURE — 95800 SLP STDY UNATTENDED: CPT

## 2023-07-12 DIAGNOSIS — G47.33 OBSTRUCTIVE SLEEP APNEA (ADULT) (PEDIATRIC): ICD-10-CM

## 2023-07-14 ENCOUNTER — APPOINTMENT (OUTPATIENT)
Dept: PULMONOLOGY | Facility: CLINIC | Age: 68
End: 2023-07-14
Payer: COMMERCIAL

## 2023-07-14 PROCEDURE — 99442: CPT

## 2023-08-15 ENCOUNTER — OUTPATIENT (OUTPATIENT)
Dept: OUTPATIENT SERVICES | Facility: HOSPITAL | Age: 68
LOS: 1 days | End: 2023-08-15
Payer: COMMERCIAL

## 2023-08-15 ENCOUNTER — APPOINTMENT (OUTPATIENT)
Dept: UROLOGY | Facility: CLINIC | Age: 68
End: 2023-08-15
Payer: COMMERCIAL

## 2023-08-15 DIAGNOSIS — Z85.51 PERSONAL HISTORY OF MALIGNANT NEOPLASM OF BLADDER: ICD-10-CM

## 2023-08-15 DIAGNOSIS — M95.9 ACQUIRED DEFORMITY OF MUSCULOSKELETAL SYSTEM, UNSPECIFIED: Chronic | ICD-10-CM

## 2023-08-15 DIAGNOSIS — Z98.89 OTHER SPECIFIED POSTPROCEDURAL STATES: Chronic | ICD-10-CM

## 2023-08-15 DIAGNOSIS — R35.0 FREQUENCY OF MICTURITION: ICD-10-CM

## 2023-08-15 PROCEDURE — 52000 CYSTOURETHROSCOPY: CPT

## 2023-08-16 DIAGNOSIS — Z85.51 PERSONAL HISTORY OF MALIGNANT NEOPLASM OF BLADDER: ICD-10-CM

## 2023-08-16 DIAGNOSIS — Z85.528 PERSONAL HISTORY OF OTHER MALIGNANT NEOPLASM OF KIDNEY: ICD-10-CM

## 2023-08-17 LAB — URINE CYTOLOGY: NORMAL

## 2023-08-29 ENCOUNTER — RX RENEWAL (OUTPATIENT)
Age: 68
End: 2023-08-29

## 2023-09-05 RX ORDER — ROSUVASTATIN CALCIUM 5 MG/1
5 TABLET, FILM COATED ORAL
Qty: 90 | Refills: 3 | Status: ACTIVE | COMMUNITY
Start: 2020-12-14 | End: 1900-01-01

## 2023-09-05 RX ORDER — METOPROLOL SUCCINATE 25 MG/1
25 TABLET, EXTENDED RELEASE ORAL DAILY
Qty: 90 | Refills: 1 | Status: ACTIVE | COMMUNITY
Start: 2023-03-26 | End: 1900-01-01

## 2023-11-08 ENCOUNTER — RX RENEWAL (OUTPATIENT)
Age: 68
End: 2023-11-08

## 2023-12-21 NOTE — ED PROVIDER NOTE - OBJECTIVE STATEMENT
64yM w/pmhx afib on xarelto, polycythemia vera, HTN, HLD, hx renal cell carcinoma, recently dx bladder cancer yesterday? presenting with hyperventilation. Pt states his urologist placed a oates this morning due to urinary retention following bladder biopsy yesterday. Today he suddenly developed hyperventilation. Associated pt has hematuria. Pt denies chest pain, abd pain, n/v/d, cough, URI symptoms, fever/chills or any other concerns. 64yM w/pmhx afib on xarelto, polycythemia vera, HTN, HLD, hx renal cell carcinoma, hx bladder cancer presenting with hyperventilation. Ptw wife is providing history as pt is currently hyperventilating. She states his urologist placed a oates this morning due to urinary retention following bladder biopsy yesterday after a lesion was seen in the bladder one month ago when attempting botox to treat urinary frequency. Today he suddenly developed hyperventilation around 2pm, she was able to control his breathing but he had 2 subsequent episodes of hyperventilation with shaking. Associated pt has hematuria. Pt denies chest pain, abd pain, n/v/d, cough, URI symptoms, fever/chills or any other concerns. 64yM w/pmhx afib on xarelto, polycythemia vera, HTN, HLD, hx renal cell carcinoma, hx bladder cancer presenting with hyperventilation. Ptw wife is providing history as pt is currently hyperventilating. She states his urologist placed a oates this morning due to urinary retention following bladder biopsy yesterday after a lesion was seen in the bladder one month ago when attempting botox to treat urinary frequency. Today he suddenly developed hyperventilation around 2pm, she was able to control his breathing but he had 2 subsequent episodes of hyperventilation with shaking. Associated pt has hematuria. Pt denies chest pain, abd pain, n/v/d, cough, URI symptoms, fever/chills or any other concerns.    Uro: sravanthi 100% of the time/able to follow multistep instructions

## 2023-12-26 ENCOUNTER — RX RENEWAL (OUTPATIENT)
Age: 68
End: 2023-12-26

## 2024-01-18 ENCOUNTER — APPOINTMENT (OUTPATIENT)
Dept: CARDIOLOGY | Facility: CLINIC | Age: 69
End: 2024-01-18
Payer: COMMERCIAL

## 2024-01-18 ENCOUNTER — NON-APPOINTMENT (OUTPATIENT)
Age: 69
End: 2024-01-18

## 2024-01-18 VITALS
WEIGHT: 213 LBS | HEART RATE: 63 BPM | OXYGEN SATURATION: 99 % | DIASTOLIC BLOOD PRESSURE: 75 MMHG | BODY MASS INDEX: 28.23 KG/M2 | SYSTOLIC BLOOD PRESSURE: 136 MMHG | HEIGHT: 73 IN

## 2024-01-18 VITALS — SYSTOLIC BLOOD PRESSURE: 114 MMHG | DIASTOLIC BLOOD PRESSURE: 70 MMHG

## 2024-01-18 PROCEDURE — 99214 OFFICE O/P EST MOD 30 MIN: CPT | Mod: 25

## 2024-01-18 PROCEDURE — 93000 ELECTROCARDIOGRAM COMPLETE: CPT

## 2024-01-19 ENCOUNTER — APPOINTMENT (OUTPATIENT)
Dept: UROLOGY | Facility: CLINIC | Age: 69
End: 2024-01-19
Payer: COMMERCIAL

## 2024-01-19 PROCEDURE — 64566 NEUROELTRD STIM POST TIBIAL: CPT

## 2024-01-26 ENCOUNTER — APPOINTMENT (OUTPATIENT)
Dept: UROLOGY | Facility: CLINIC | Age: 69
End: 2024-01-26
Payer: COMMERCIAL

## 2024-01-26 PROCEDURE — 64566 NEUROELTRD STIM POST TIBIAL: CPT

## 2024-01-31 LAB
ALBUMIN SERPL ELPH-MCNC: 4.4 G/DL
ALP BLD-CCNC: 102 U/L
ALT SERPL-CCNC: 21 U/L
ANION GAP SERPL CALC-SCNC: 10 MMOL/L
AST SERPL-CCNC: 26 U/L
BILIRUB SERPL-MCNC: 0.6 MG/DL
BUN SERPL-MCNC: 19 MG/DL
CALCIUM SERPL-MCNC: 9.5 MG/DL
CHLORIDE SERPL-SCNC: 102 MMOL/L
CHOLEST SERPL-MCNC: 138 MG/DL
CO2 SERPL-SCNC: 26 MMOL/L
CREAT SERPL-MCNC: 1.01 MG/DL
EGFR: 82 ML/MIN/1.73M2
GLUCOSE SERPL-MCNC: 98 MG/DL
HDLC SERPL-MCNC: 45 MG/DL
LDLC SERPL CALC-MCNC: 80 MG/DL
NONHDLC SERPL-MCNC: 93 MG/DL
POTASSIUM SERPL-SCNC: 4.3 MMOL/L
PROT SERPL-MCNC: 6.6 G/DL
SODIUM SERPL-SCNC: 138 MMOL/L
TRIGL SERPL-MCNC: 68 MG/DL

## 2024-02-01 LAB
ESTIMATED AVERAGE GLUCOSE: 108 MG/DL
HBA1C MFR BLD HPLC: 5.4 %

## 2024-02-06 NOTE — PLAN
Bracing the thumb at night  Compression sleeve copper fit will help    Voltaren gel for topical pain therapy up to four times per day  If no better in 3 more weeks, we will XRAY.    Thank you for coming in today, if any questions or concerns arise, please call my office.   DIANE Baldwin-CNP    
[FreeTextEntry1] : Antibiotic to cover wound\par Wound care if opens\par Maintain Xarelto\par Does not need diuretic for the annalisa wound swelling. \par Take a probiotic with antibiotic. \par Keep wound covered.

## 2024-02-09 ENCOUNTER — APPOINTMENT (OUTPATIENT)
Dept: ELECTROPHYSIOLOGY | Facility: CLINIC | Age: 69
End: 2024-02-09
Payer: COMMERCIAL

## 2024-02-09 ENCOUNTER — NON-APPOINTMENT (OUTPATIENT)
Age: 69
End: 2024-02-09

## 2024-02-09 ENCOUNTER — APPOINTMENT (OUTPATIENT)
Dept: UROLOGY | Facility: CLINIC | Age: 69
End: 2024-02-09

## 2024-02-09 VITALS
SYSTOLIC BLOOD PRESSURE: 117 MMHG | DIASTOLIC BLOOD PRESSURE: 65 MMHG | BODY MASS INDEX: 28.63 KG/M2 | WEIGHT: 216 LBS | OXYGEN SATURATION: 100 % | HEIGHT: 73 IN | HEART RATE: 57 BPM | RESPIRATION RATE: 14 BRPM

## 2024-02-09 DIAGNOSIS — G47.33 OBSTRUCTIVE SLEEP APNEA (ADULT) (PEDIATRIC): ICD-10-CM

## 2024-02-09 PROCEDURE — 99215 OFFICE O/P EST HI 40 MIN: CPT | Mod: 25

## 2024-02-09 PROCEDURE — 93000 ELECTROCARDIOGRAM COMPLETE: CPT

## 2024-02-09 RX ORDER — HYDROXYZINE HYDROCHLORIDE 10 MG/1
10 TABLET ORAL
Refills: 0 | Status: ACTIVE | COMMUNITY

## 2024-02-09 RX ORDER — RUXOLITINIB 5 MG/1
5 TABLET ORAL
Refills: 0 | Status: DISCONTINUED | COMMUNITY
Start: 2018-12-12 | End: 2024-02-09

## 2024-02-09 NOTE — CARDIOLOGY SUMMARY
[de-identified] : ECG from 2/9/2024: Sinus bradycardia at 57bpm ECG from 2/3/2023: Sinus bradycardia with a ventricular rate of 52 beats per minute ECG from 10/14/2022: Sinus bradycardia at 56 bpm ECG from 6/23/2022: Sinus elaine @ 55bpm, normal axis, normal intervals [de-identified] : Zio XT from 10/14/2022-10/28/2022: min HR: 40, max HR: 235, mean HR: 65, AF burden 10%. Periods documented as non-sustained VT likely AF with aberrancy. There were no episodes listed as NSVT in sinus rhythm. Symptoms correlated with both sinus rhythm and with atrial fibrillation. Ventricular rates in AF not well rate controlled (HR range in AF , mean 105)\par  \par  Zio XT from 8/11/2021-8/18/2021: min HR: 36, mean HR: 69, max HR: 235, 120 episodes of NSVT - HR range 129-135bpm, fastest 6 beats 235bpm, longest 15 beats at 154bpm, AF burden 16% - longest episode of AF 12 hours, 37 mins, mean HR in AF: 99bpm [de-identified] : Stress test from 1/13/2023: duration 9:58, 11.5METS, max HR: 117 (75% of MPHR), no changes in QRS duration on my review of the ECG strips in [employer] [de-identified] : TTE from 7/26/2022: LA: 4.29, LASHAUN: 28.1, normal global LVSF, LVEF: 60-65%, normal diastolic filling, normal RV size and function, mild RA dilation, no pericardial effusion, trace MR, trivial TR\par  \par  TTE from 8/13/2021: EF: 60-65%, LA: 4.25, LASHAUN: 28.7 [de-identified] : CT Angiography Coronary from 9/13/2021: short segment superficial myocardial bridging in the mid LAD without stenosis, mild burden of coronary calcium, score 21.2 (34th percentile), small pericardial effusion, mildly dilated PA [de-identified] : Ablation from 12/27/2022: Successful PVI with entrance and exit block demonstrated in each of the PVs / CTI-dependent right atrial flutter was spontaneously induced, successful CTI ablation with bidirectional block achieved across the lesion set  [de-identified] : Sleep study from 11/2/2022: moderate SHRAVAN with a pAHI of 19.1/hr

## 2024-02-09 NOTE — PHYSICAL EXAM
[Well Developed] : well developed [Well Nourished] : well nourished [No Acute Distress] : no acute distress [Normal Venous Pressure] : normal venous pressure [No Carotid Bruit] : no carotid bruit [Normal S1, S2] : normal S1, S2 [No Murmur] : no murmur [No Rub] : no rub [No Gallop] : no gallop [Clear Lung Fields] : clear lung fields [Good Air Entry] : good air entry [No Respiratory Distress] : no respiratory distress  [Soft] : abdomen soft [Non Tender] : non-tender [No Masses/organomegaly] : no masses/organomegaly [Normal Bowel Sounds] : normal bowel sounds [Normal Gait] : normal gait [No Rash] : no rash [Moves all extremities] : moves all extremities [No Focal Deficits] : no focal deficits [Normal Speech] : normal speech [Alert and Oriented] : alert and oriented [Normal memory] : normal memory [de-identified] : mild edema noted

## 2024-02-09 NOTE — HISTORY OF PRESENT ILLNESS
[FreeTextEntry1] : Mr. Prasanna Edwards is a 68-year-old man with paroxysmal atrial fibrillation, hypertension, moderate obstructive sleep apnea (using CPAP) and post-polycythemia vera myelofibrosis. He presents today for follow-up after a catheter ablation of atrial fibrillation on 12/27/2022.  To summarize his history, his atrial fibrillation was initial detected in the post-operative period following a right laparoscopic partial nephrectomy in 2013. He was subsequently found to be in atrial fibrillation in 2018.  His AF burden from 2021 was 16%. His symptoms included lightheadedness and fatigue. His ablation on 12/27/2022 included pulmonary vein isolation and ablation to treat inducible cavotricuspid isthmus dependent atrial flutter. Following his ablation, he had a recurrence of atrial fibrillation during the blanking period. He presented for a cardioversion and was found to be in sinus rhythm. He has a recurrence of paroxysmal AF in the blanking period and was started on standing Flecainide. He underwent an ECG stress test while on Flecainide. QRS widening was not noted during exercise.   Mr. Edwards presents today for follow-up. He has been feeling good overall and denies chest pain, palpitations, fatigue, shortness of breath, lightheadedness, pre-syncope, and syncope. He was recently diagnosed with post-polycythemia vera myelofibrosis (PPV-MF) but reports that his condition is stable with mild anemia. He uses a smart watch ([employer] watch) to monitor his heart rate and rhythm and reports that his watch has not indicated the presence of recurrent atrial fibrillation. He remains on Flecainide, Metoprolol, and Xarelto for which he reports no bleeding complications. His platelets remain stable ~ 180K. His ECG today demonstrates sinus bradycardia at 57 bpm.

## 2024-02-09 NOTE — DISCUSSION/SUMMARY
[Paroxysmal Atrial Fibrillation] : paroxysmal atrial fibrillation [FreeTextEntry1] : Mr. Prasanna Edwards is a 68-year-old man with paroxysmal atrial fibrillation, hypertension, moderate obstructive sleep apnea (using CPAP) and post-polycythemia vera myelofibrosis. He presents today for follow-up after a catheter ablation of atrial fibrillation on 12/27/2022.  Given his excellent tolerance of the current antiarrhythmic regimen, the patient reports that he would like to continue with Flecainide. We discussed the risks and benefits of continuing Flecainide and the need for annual stress testing to determine the presence of coronary artery disease with the caveat that if we determine presence of coronary artery disease, Flecainide would need to be immediately discontinued. We emphasized that, with advancement in age, he might not be able to tolerate the antiarrhythmic medication or titration of the beta blockers due to sinus bradycardia. He prefers to continue with Flecainide and verbalized understanding of the risks and testing necessary. We reviewed the mechanism of recurrent atrial fibrillation in patients with a prior catheter ablation of atrial fibrillation. We discussed the possibility of a repeat ablation as well.  We also discussed the need for anticoagulation use. We discussed that a decision to stop anticoagulation following a catheter ablation of atrial fibrillation is driven by the CHADS2-VASC score. His CHADS2-VASC is 2 (Age: 1, HTN: 1). He will remain on anticoagulation for the time being. Patient will follow up in one year and as needed.  [EKG obtained to assist in diagnosis and management of assessed problem(s)] : EKG obtained to assist in diagnosis and management of assessed problem(s)

## 2024-02-16 ENCOUNTER — APPOINTMENT (OUTPATIENT)
Dept: UROLOGY | Facility: CLINIC | Age: 69
End: 2024-02-16
Payer: COMMERCIAL

## 2024-02-16 DIAGNOSIS — R33.9 RETENTION OF URINE, UNSPECIFIED: ICD-10-CM

## 2024-02-16 PROCEDURE — 64566 NEUROELTRD STIM POST TIBIAL: CPT

## 2024-02-23 ENCOUNTER — APPOINTMENT (OUTPATIENT)
Dept: UROLOGY | Facility: CLINIC | Age: 69
End: 2024-02-23
Payer: COMMERCIAL

## 2024-02-23 DIAGNOSIS — N32.81 OVERACTIVE BLADDER: ICD-10-CM

## 2024-02-23 DIAGNOSIS — N39.41 URGE INCONTINENCE: ICD-10-CM

## 2024-02-23 PROCEDURE — 64566 NEUROELTRD STIM POST TIBIAL: CPT

## 2024-02-23 NOTE — ASSESSMENT
[FreeTextEntry1] : Mr. Edwards is a 68 years old male who presents to the office today for PTNS treatment by recommendation of Dr. Love.  Unfortunately, the patient has significant peripheral neuropathy with bilateral +1 edema.  The patient does have history of urinary urgency and frequency and wish to try PTNS therapy as a precursor to [employer] InterStim implant.  Today after 2 attempts of placement of the 34-gauge needle, I failed to elicit any appropriate response in the right foot.  Subsequently, the needle was kept in the left ankle and minimal foot sensation was accomplished at #12.  There was no toe sensation in either left or right foot at any point.  The stimulation was started at #12 and ultimately after 15 minutes of treatment, the patient began to complain of severe pain in the left foot where the sensation was brought down to #10.  That is where the stimulation remained for the rest of the treatment.  The patient was able to complete the PTNS treatment today.    Due to Mr. Edwards's severe peripheral neuropathy, I am not sure whether this form of therapy is reasonable for the patient as on many attempts of placement of the needle, I am unable to elicit appropriate response.  I suggested to the patient to discuss with his urologist.  I suggested to consider a PNE trial with Dr. Mares or Dr. Christianson.

## 2024-03-01 ENCOUNTER — APPOINTMENT (OUTPATIENT)
Dept: UROLOGY | Facility: CLINIC | Age: 69
End: 2024-03-01

## 2024-03-05 NOTE — ASU DISCHARGE PLAN (ADULT/PEDIATRIC) - D. IF YOU HAD ANY TYPE OF SEDATION, YOU MAY EXPERIENCE LIGHTHEADEDNESS, DIZZINESS, OR SLEEPINESS FOLLOWING YOUR PROCEDURE. A RESPONSIBLE ADULT SHOULD STAY WITH YOU FOR AT LEAST 24 HOURS FOLLOWING YOUR PROCEDURE.
Statement Selected Bexarotene Counseling:  I discussed with the patient the risks of bexarotene including but not limited to hair loss, dry lips/skin/eyes, liver abnormalities, hyperlipidemia, pancreatitis, depression/suicidal ideation, photosensitivity, drug rash/allergic reactions, hypothyroidism, anemia, leukopenia, infection, cataracts, and teratogenicity.  Patient understands that they will need regular blood tests to check lipid profile, liver function tests, white blood cell count, thyroid function tests and pregnancy test if applicable. Finasteride Female Counseling: Finasteride Counseling:  I discussed with the patient the risks of use of finasteride including but not limited to decreased libido and sexual dysfunction. Explained the teratogenic nature of the medication and stressed the importance of not getting pregnant during treatment. All of the patient's questions and concerns were addressed. Fluconazole Pregnancy And Lactation Text: This medication is Pregnancy Category C and it isn't know if it is safe during pregnancy. It is also excreted in breast milk. Opioid Pregnancy And Lactation Text: These medications can lead to premature delivery and should be avoided during pregnancy. These medications are also present in breast milk in small amounts. Solaraze Counseling:  I discussed with the patient the risks of Solaraze including but not limited to erythema, scaling, itching, weeping, crusting, and pain. Winlevi Pregnancy And Lactation Text: This medication is considered safe during pregnancy and breastfeeding. Picato Counseling:  I discussed with the patient the risks of Picato including but not limited to erythema, scaling, itching, weeping, crusting, and pain. Stelara Pregnancy And Lactation Text: This medication is Pregnancy Category B and is considered safe during pregnancy. It is unknown if this medication is excreted in breast milk. Cimetidine Counseling:  I discussed with the patient the risks of Cimetidine including but not limited to gynecomastia, headache, diarrhea, nausea, drowsiness, arrhythmias, pancreatitis, skin rashes, psychosis, bone marrow suppression and kidney toxicity. Rituxan Counseling:  I discussed with the patient the risks of Rituxan infusions. Side effects can include infusion reactions, severe drug rashes including mucocutaneous reactions, reactivation of latent hepatitis and other infections and rarely progressive multifocal leukoencephalopathy.  All of the patient's questions and concerns were addressed. Tranexamic Acid Counseling:  Patient advised of the small risk of bleeding problems with tranexamic acid. They were also instructed to call if they developed any nausea, vomiting or diarrhea. All of the patient's questions and concerns were addressed. Sotyktu Counseling:  I discussed the most common side effects of Sotyktu including: common cold, sore throat, sinus infections, cold sores, canker sores, folliculitis, and acne.  I also discussed more serious side effects of Sotyktu including but not limited to: serious allergic reactions; increased risk for infections such as TB; cancers such as lymphomas; rhabdomyolysis and elevated CPK; and elevated triglycerides and liver enzymes.  Libtayo Counseling- I discussed with the patient the risks of Libtayo including but not limited to nausea, vomiting, diarrhea, and bone or muscle pain.  The patient verbalized understanding of the proper use and possible adverse effects of Libtayo.  All of the patient's questions and concerns were addressed. Azelaic Acid Pregnancy And Lactation Text: This medication is considered safe during pregnancy and breast feeding. Cellcept Pregnancy And Lactation Text: This medication is Pregnancy Category D and isn't considered safe during pregnancy. It is unknown if this medication is excreted in breast milk. Ivermectin Pregnancy And Lactation Text: This medication is Pregnancy Category C and it isn't known if it is safe during pregnancy. It is also excreted in breast milk. Topical Ketoconazole Counseling: Patient counseled that this medication may cause skin irritation or allergic reactions.  In the event of skin irritation, the patient was advised to reduce the amount of the drug applied or use it less frequently.   The patient verbalized understanding of the proper use and possible adverse effects of ketoconazole.  All of the patient's questions and concerns were addressed. Niacinamide Pregnancy And Lactation Text: These medications are considered safe during pregnancy. Oxybutynin Counseling:  I discussed with the patient the risks of oxybutynin including but not limited to skin rash, drowsiness, dry mouth, difficulty urinating, and blurred vision. Doxycycline Counseling:  Patient counseled regarding possible photosensitivity and increased risk for sunburn.  Patient instructed to avoid sunlight, if possible.  When exposed to sunlight, patients should wear protective clothing, sunglasses, and sunscreen.  The patient was instructed to call the office immediately if the following severe adverse effects occur:  hearing changes, easy bruising/bleeding, severe headache, or vision changes.  The patient verbalized understanding of the proper use and possible adverse effects of doxycycline.  All of the patient's questions and concerns were addressed. Imiquimod Pregnancy And Lactation Text: This medication is Pregnancy Category C. It is unknown if this medication is excreted in breast milk. Gabapentin Counseling: I discussed with the patient the risks of gabapentin including but not limited to dizziness, somnolence, fatigue and ataxia. Finasteride Pregnancy And Lactation Text: This medication is absolutely contraindicated during pregnancy. It is unknown if it is excreted in breast milk. Drysol Counseling:  I discussed with the patient the risks of drysol/aluminum chloride including but not limited to skin rash, itching, irritation, burning. Solaraze Pregnancy And Lactation Text: This medication is Pregnancy Category B and is considered safe. There is some data to suggest avoiding during the third trimester. It is unknown if this medication is excreted in breast milk. Tranexamic Acid Pregnancy And Lactation Text: It is unknown if this medication is safe during pregnancy or breast feeding. Rifampin Counseling: I discussed with the patient the risks of rifampin including but not limited to liver damage, kidney damage, red-orange body fluids, nausea/vomiting and severe allergy. Griseofulvin Counseling:  I discussed with the patient the risks of griseofulvin including but not limited to photosensitivity, cytopenia, liver damage, nausea/vomiting and severe allergy.  The patient understands that this medication is best absorbed when taken with a fatty meal (e.g., ice cream or french fries). Bexarotene Pregnancy And Lactation Text: This medication is Pregnancy Category X and should not be given to women who are pregnant or may become pregnant. This medication should not be used if you are breast feeding. Taltz Counseling: I discussed with the patient the risks of ixekizumab including but not limited to immunosuppression, serious infections, worsening of inflammatory bowel disease and drug reactions.  The patient understands that monitoring is required including a PPD at baseline and must alert us or the primary physician if symptoms of infection or other concerning signs are noted. Sotyktu Pregnancy And Lactation Text: There is insufficient data to evaluate whether or not Sotyktu is safe to use during pregnancy.   It is not known if Sotyktu passes into breast milk and whether or not it is safe to use when breastfeeding.   Rituxan Pregnancy And Lactation Text: This medication is Pregnancy Category C and it isn't know if it is safe during pregnancy. It is unknown if this medication is excreted in breast milk but similar antibodies are known to be excreted. Dupixent Counseling: I discussed with the patient the risks of dupilumab including but not limited to eye infection and irritation, cold sores, injection site reactions, worsening of asthma, allergic reactions and increased risk of parasitic infection.  Live vaccines should be avoided while taking dupilumab. Dupilumab will also interact with certain medications such as warfarin and cyclosporine. The patient understands that monitoring is required and they must alert us or the primary physician if symptoms of infection or other concerning signs are noted. Cimetidine Pregnancy And Lactation Text: This medication is Pregnancy Category B and is considered safe during pregnancy. It is also excreted in breast milk and breast feeding isn't recommended. Gabapentin Pregnancy And Lactation Text: This medication is Pregnancy Category C and isn't considered safe during pregnancy. It is excreted in breast milk. Azithromycin Counseling:  I discussed with the patient the risks of azithromycin including but not limited to GI upset, allergic reaction, drug rash, diarrhea, and yeast infections. Cibinqo Counseling: I discussed with the patient the risks of Cibinqo therapy including but not limited to common cold, nausea, headache, cold sores, increased blood CPK levels, dizziness, UTIs, fatigue, acne, and vomitting. Live vaccines should be avoided.  This medication has been linked to serious infections; higher rate of mortality; malignancy and lymphoproliferative disorders; major adverse cardiovascular events; thrombosis; thrombocytopenia and lymphopenia; lipid elevations; and retinal detachment. Libtayo Pregnancy And Lactation Text: This medication is contraindicated in pregnancy and when breast feeding. Benzoyl Peroxide Counseling: Patient counseled that medicine may cause skin irritation and bleach clothing.  In the event of skin irritation, the patient was advised to reduce the amount of the drug applied or use it less frequently.   The patient verbalized understanding of the proper use and possible adverse effects of benzoyl peroxide.  All of the patient's questions and concerns were addressed. Soolantra Counseling: I discussed with the patients the risks of topial Soolantra. This is a medicine which decreases the number of mites and inflammation in the skin. You experience burning, stinging, eye irritation or allergic reactions.  Please call our office if you develop any problems from using this medication. VTAMA Counseling: I discussed with the patient that VTAMA is not for use in the eyes, mouth or mouth. They should call the office if they develop any signs of allergic reactions to VTAMA. The patient verbalized understanding of the proper use and possible adverse effects of VTAMA.  All of the patient's questions and concerns were addressed. Cyclophosphamide Counseling:  I discussed with the patient the risks of cyclophosphamide including but not limited to hair loss, hormonal abnormalities, decreased fertility, abdominal pain, diarrhea, nausea and vomiting, bone marrow suppression and infection. The patient understands that monitoring is required while taking this medication. Nsaids Counseling: NSAID Counseling: I discussed with the patient that NSAIDs should be taken with food. Prolonged use of NSAIDs can result in the development of stomach ulcers.  Patient advised to stop taking NSAIDs if abdominal pain occurs.  The patient verbalized understanding of the proper use and possible adverse effects of NSAIDs.  All of the patient's questions and concerns were addressed. Klisyri Counseling:  I discussed with the patient the risks of Klisyri including but not limited to erythema, scaling, itching, weeping, crusting, and pain. Doxycycline Pregnancy And Lactation Text: This medication is Pregnancy Category D and not consider safe during pregnancy. It is also excreted in breast milk but is considered safe for shorter treatment courses. Topical Ketoconazole Pregnancy And Lactation Text: This medication is Pregnancy Category B and is considered safe during pregnancy. It is unknown if it is excreted in breast milk. Birth Control Pills Counseling: Birth Control Pill Counseling: I discussed with the patient the potential side effects of OCPs including but not limited to increased risk of stroke, heart attack, thrombophlebitis, deep venous thrombosis, hepatic adenomas, breast changes, GI upset, headaches, and depression.  The patient verbalized understanding of the proper use and possible adverse effects of OCPs. All of the patient's questions and concerns were addressed. Arava Counseling:  Patient counseled regarding adverse effects of Arava including but not limited to nausea, vomiting, abnormalities in liver function tests. Patients may develop mouth sores, rash, diarrhea, and abnormalities in blood counts. The patient understands that monitoring is required including LFTs and blood counts.  There is a rare possibility of scarring of the liver and lung problems that can occur when taking methotrexate. Persistent nausea, loss of appetite, pale stools, dark urine, cough, and shortness of breath should be reported immediately. Patient advised to discontinue Arava treatment and consult with a physician prior to attempting conception. The patient will have to undergo a treatment to eliminate Arava from the body prior to conception. Adbry Counseling: I discussed with the patient the risks of tralokinumab including but not limited to eye infection and irritation, cold sores, injection site reactions, worsening of asthma, allergic reactions and increased risk of parasitic infection.  Live vaccines should be avoided while taking tralokinumab. The patient understands that monitoring is required and they must alert us or the primary physician if symptoms of infection or other concerning signs are noted. Taltz Pregnancy And Lactation Text: The risk during pregnancy and breastfeeding is uncertain with this medication. Griseofulvin Pregnancy And Lactation Text: This medication is Pregnancy Category X and is known to cause serious birth defects. It is unknown if this medication is excreted in breast milk but breast feeding should be avoided. Cibinqo Pregnancy And Lactation Text: It is unknown if this medication will adversely affect pregnancy or breast feeding.  You should not take this medication if you are currently pregnant or planning a pregnancy or while breastfeeding. Dupixent Pregnancy And Lactation Text: This medication likely crosses the placenta but the risk for the fetus is uncertain. This medication is excreted in breast milk. Doxepin Counseling:  Patient advised that the medication is sedating and not to drive a car after taking this medication. Patient informed of potential adverse effects including but not limited to dry mouth, urinary retention, and blurry vision.  The patient verbalized understanding of the proper use and possible adverse effects of doxepin.  All of the patient's questions and concerns were addressed. Xeljanz Counseling: I discussed with the patient the risks of Xeljanz therapy including increased risk of infection, liver issues, headache, diarrhea, or cold symptoms. Live vaccines should be avoided. They were instructed to call if they have any problems. Protopic Counseling: Patient may experience a mild burning sensation during topical application. Protopic is not approved in children less than 2 years of age. There have been case reports of hematologic and skin malignancies in patients using topical calcineurin inhibitors although causality is questionable. Valtrex Counseling: I discussed with the patient the risks of valacyclovir including but not limited to kidney damage, nausea, vomiting and severe allergy.  The patient understands that if the infection seems to be worsening or is not improving, they are to call. Odomzo Counseling- I discussed with the patient the risks of Odomzo including but not limited to nausea, vomiting, diarrhea, constipation, weight loss, changes in the sense of taste, decreased appetite, muscle spasms, and hair loss.  The patient verbalized understanding of the proper use and possible adverse effects of Odomzo.  All of the patient's questions and concerns were addressed. Isotretinoin Counseling: Patient should get monthly blood tests, not donate blood, not drive at night if vision affected, not share medication, and not undergo elective surgery for 6 months after tx completed. Side effects reviewed, pt to contact office should one occur. Rifampin Pregnancy And Lactation Text: This medication is Pregnancy Category C and it isn't know if it is safe during pregnancy. It is also excreted in breast milk and should not be used if you are breast feeding. Azithromycin Pregnancy And Lactation Text: This medication is considered safe during pregnancy and is also secreted in breast milk. Siliq Counseling:  I discussed with the patient the risks of Siliq including but not limited to new or worsening depression, suicidal thoughts and behavior, immunosuppression, malignancy, posterior leukoencephalopathy syndrome, and serious infections.  The patient understands that monitoring is required including a PPD at baseline and must alert us or the primary physician if symptoms of infection or other concerning signs are noted. There is also a special program designed to monitor depression which is required with Siliq. Erythromycin Counseling:  I discussed with the patient the risks of erythromycin including but not limited to GI upset, allergic reaction, drug rash, diarrhea, increase in liver enzymes, and yeast infections. Klisyri Pregnancy And Lactation Text: It is unknown if this medication can harm a developing fetus or if it is excreted in breast milk. Soolantra Pregnancy And Lactation Text: This medication is Pregnancy Category C. This medication is considered safe during breast feeding. Arava Pregnancy And Lactation Text: This medication is Pregnancy Category X and is absolutely contraindicated during pregnancy. It is unknown if it is excreted in breast milk. Vtama Pregnancy And Lactation Text: It is unknown if this medication can cause problems during pregnancy and breastfeeding. Nsaids Pregnancy And Lactation Text: These medications are considered safe up to 30 weeks gestation. It is excreted in breast milk. Glycopyrrolate Counseling:  I discussed with the patient the risks of glycopyrrolate including but not limited to skin rash, drowsiness, dry mouth, difficulty urinating, and blurred vision. Birth Control Pills Pregnancy And Lactation Text: This medication should be avoided if pregnant and for the first 30 days post-partum. Elidel Counseling: Patient may experience a mild burning sensation during topical application. Elidel is not approved in children less than 2 years of age. There have been case reports of hematologic and skin malignancies in patients using topical calcineurin inhibitors although causality is questionable. Benzoyl Peroxide Pregnancy And Lactation Text: This medication is Pregnancy Category C. It is unknown if benzoyl peroxide is excreted in breast milk. Cyclophosphamide Pregnancy And Lactation Text: This medication is Pregnancy Category D and it isn't considered safe during pregnancy. This medication is excreted in breast milk. Topical Metronidazole Counseling: Metronidazole is a topical antibiotic medication. You may experience burning, stinging, redness, or allergic reactions.  Please call our office if you develop any problems from using this medication. Propranolol Counseling:  I discussed with the patient the risks of propranolol including but not limited to low heart rate, low blood pressure, low blood sugar, restlessness and increased cold sensitivity. They should call the office if they experience any of these side effects. Isotretinoin Pregnancy And Lactation Text: This medication is Pregnancy Category X and is considered extremely dangerous during pregnancy. It is unknown if it is excreted in breast milk. Tremfya Counseling: I discussed with the patient the risks of guselkumab including but not limited to immunosuppression, serious infections, worsening of inflammatory bowel disease and drug reactions.  The patient understands that monitoring is required including a PPD at baseline and must alert us or the primary physician if symptoms of infection or other concerning signs are noted. Itraconazole Counseling:  I discussed with the patient the risks of itraconazole including but not limited to liver damage, nausea/vomiting, neuropathy, and severe allergy.  The patient understands that this medication is best absorbed when taken with acidic beverages such as non-diet cola or ginger ale.  The patient understands that monitoring is required including baseline LFTs and repeat LFTs at intervals.  The patient understands that they are to contact us or the primary physician if concerning signs are noted. Bactrim Counseling:  I discussed with the patient the risks of sulfa antibiotics including but not limited to GI upset, allergic reaction, drug rash, diarrhea, dizziness, photosensitivity, and yeast infections.  Rarely, more serious reactions can occur including but not limited to aplastic anemia, agranulocytosis, methemoglobinemia, blood dyscrasias, liver or kidney failure, lung infiltrates or desquamative/blistering drug rashes. Doxepin Pregnancy And Lactation Text: This medication is Pregnancy Category C and it isn't known if it is safe during pregnancy. It is also excreted in breast milk and breast feeding isn't recommended. Litfulo Counseling: I discussed with the patient the risks of Litfulo therapy including but not limited to upper respiratory tract infections, shingles, cold sores, and nausea. Live vaccines should be avoided.  This medication has been linked to serious infections; higher rate of mortality; malignancy and lymphoproliferative disorders; major adverse cardiovascular events; thrombosis; gastrointestinal perforations; neutropenia; lymphopenia; anemia; liver enzyme elevations; and lipid elevations. Adbry Pregnancy And Lactation Text: It is unknown if this medication will adversely affect pregnancy or breast feeding. Minoxidil Counseling: Minoxidil is a topical medication which can increase blood flow where it is applied. It is uncertain how this medication increases hair growth. Side effects are uncommon and include stinging and allergic reactions. Erythromycin Pregnancy And Lactation Text: This medication is Pregnancy Category B and is considered safe during pregnancy. It is also excreted in breast milk. Protopic Pregnancy And Lactation Text: This medication is Pregnancy Category C. It is unknown if this medication is excreted in breast milk when applied topically. Sarecycline Counseling: Patient advised regarding possible photosensitivity and discoloration of the teeth, skin, lips, tongue and gums.  Patient instructed to avoid sunlight, if possible.  When exposed to sunlight, patients should wear protective clothing, sunglasses, and sunscreen.  The patient was instructed to call the office immediately if the following severe adverse effects occur:  hearing changes, easy bruising/bleeding, severe headache, or vision changes.  The patient verbalized understanding of the proper use and possible adverse effects of sarecycline.  All of the patient's questions and concerns were addressed. Xellelez Pregnancy And Lactation Text: This medication is Pregnancy Category D and is not considered safe during pregnancy.  The risk during breast feeding is also uncertain. Valtrex Pregnancy And Lactation Text: this medication is Pregnancy Category B and is considered safe during pregnancy. This medication is not directly found in breast milk but it's metabolite acyclovir is present. Enbrel Counseling:  I discussed with the patient the risks of etanercept including but not limited to myelosuppression, immunosuppression, autoimmune hepatitis, demyelinating diseases, lymphoma, and infections.  The patient understands that monitoring is required including a PPD at baseline and must alert us or the primary physician if symptoms of infection or other concerning signs are noted. Olanzapine Counseling- I discussed with the patient the common side effects of olanzapine including but are not limited to: lack of energy, dry mouth, increased appetite, sleepiness, tremor, constipation, dizziness, changes in behavior, or restlessness.  Explained that teenagers are more likely to experience headaches, abdominal pain, pain in the arms or legs, tiredness, and sleepiness.  Serious side effects include but are not limited: increased risk of death in elderly patients who are confused, have memory loss, or dementia-related psychosis; hyperglycemia; increased cholesterol and triglycerides; and weight gain. Glycopyrrolate Pregnancy And Lactation Text: This medication is Pregnancy Category B and is considered safe during pregnancy. It is unknown if it is excreted breast milk. Propranolol Pregnancy And Lactation Text: This medication is Pregnancy Category C and it isn't known if it is safe during pregnancy. It is excreted in breast milk. Clofazimine Counseling:  I discussed with the patient the risks of clofazimine including but not limited to skin and eye pigmentation, liver damage, nausea/vomiting, gastrointestinal bleeding and allergy. Topical Metronidazole Pregnancy And Lactation Text: This medication is Pregnancy Category B and considered safe during pregnancy.  It is also considered safe to use while breastfeeding. Zoryve Counseling:  I discussed with the patient that Zoryve is not for use in the eyes, mouth or vagina. The most commonly reported side effects include diarrhea, headache, insomnia, application site pain, upper respiratory tract infections, and urinary tract infections.  All of the patient's questions and concerns were addressed. Carac Counseling:  I discussed with the patient the risks of Carac including but not limited to erythema, scaling, itching, weeping, crusting, and pain. Cyclosporine Counseling:  I discussed with the patient the risks of cyclosporine including but not limited to hypertension, gingival hyperplasia,myelosuppression, immunosuppression, liver damage, kidney damage, neurotoxicity, lymphoma, and serious infections. The patient understands that monitoring is required including baseline blood pressure, CBC, CMP, lipid panel and uric acid, and then 1-2 times monthly CMP and blood pressure. High Dose Vitamin A Counseling: Side effects reviewed, pt to contact office should one occur. Detail Level: Simple Spironolactone Counseling: Patient advised regarding risks of diarrhea, abdominal pain, hyperkalemia, birth defects (for female patients), liver toxicity and renal toxicity. The patient may need blood work to monitor liver and kidney function and potassium levels while on therapy. The patient verbalized understanding of the proper use and possible adverse effects of spironolactone.  All of the patient's questions and concerns were addressed. Topical Retinoid counseling:  Patient advised to apply a pea-sized amount only at bedtime and wait 30 minutes after washing their face before applying.  If too drying, patient may add a non-comedogenic moisturizer. The patient verbalized understanding of the proper use and possible adverse effects of retinoids.  All of the patient's questions and concerns were addressed. Use Enhanced Medication Counseling?: No Sarecycline Pregnancy And Lactation Text: This medication is Pregnancy Category D and not consider safe during pregnancy. It is also excreted in breast milk. Qbrexza Counseling:  I discussed with the patient the risks of Qbrexza including but not limited to headache, mydriasis, blurred vision, dry eyes, nasal dryness, dry mouth, dry throat, dry skin, urinary hesitation, and constipation.  Local skin reactions including erythema, burning, stinging, and itching can also occur. Hydroxyzine Counseling: Patient advised that the medication is sedating and not to drive a car after taking this medication.  Patient informed of potential adverse effects including but not limited to dry mouth, urinary retention, and blurry vision.  The patient verbalized understanding of the proper use and possible adverse effects of hydroxyzine.  All of the patient's questions and concerns were addressed. Bactrim Pregnancy And Lactation Text: This medication is Pregnancy Category D and is known to cause fetal risk.  It is also excreted in breast milk. Simponi Counseling:  I discussed with the patient the risks of golimumab including but not limited to myelosuppression, immunosuppression, autoimmune hepatitis, demyelinating diseases, lymphoma, and serious infections.  The patient understands that monitoring is required including a PPD at baseline and must alert us or the primary physician if symptoms of infection or other concerning signs are noted. Bimzelx Counseling:  I discussed with the patient the risks of Bimzelx including but not limited to depression, immunosuppression, allergic reactions and infections.  The patient understands that monitoring is required including a PPD at baseline and must alert us or the primary physician if symptoms of infection or other concerning signs are noted. Olanzapine Pregnancy And Lactation Text: This medication is pregnancy category C.   There are no adequate and well controlled trials with olanzapine in pregnant females.  Olanzapine should be used during pregnancy only if the potential benefit justifies the potential risk to the fetus.   In a study in lactating healthy women, olanzapine was excreted in breast milk.  It is recommended that women taking olanzapine should not breast feed. Eucrisa Counseling: Patient may experience a mild burning sensation during topical application. Eucrisa is not approved in children less than 2 years of age. Topical Steroids Counseling: I discussed with the patient that prolonged use of topical steroids can result in the increased appearance of superficial blood vessels (telangiectasias), lightening (hypopigmentation) and thinning of the skin (atrophy).  Patient understands to avoid using high potency steroids in skin folds, the groin or the face.  The patient verbalized understanding of the proper use and possible adverse effects of topical steroids.  All of the patient's questions and concerns were addressed. Metronidazole Counseling:  I discussed with the patient the risks of metronidazole including but not limited to seizures, nausea/vomiting, a metallic taste in the mouth, nausea/vomiting and severe allergy. Litfulo Pregnancy And Lactation Text: Based on animal studies, Lifulo may cause embryo-fetal harm when administered to pregnant women.  The medication should not be used in pregnancy.  Breastfeeding is not recommended during treatment. Minoxidil Pregnancy And Lactation Text: This medication has not been assigned a Pregnancy Risk Category but animal studies failed to show danger with the topical medication. It is unknown if the medication is excreted in breast milk. Hydroxychloroquine Counseling:  I discussed with the patient that a baseline ophthalmologic exam is needed at the start of therapy and every year thereafter while on therapy. A CBC may also be warranted for monitoring.  The side effects of this medication were discussed with the patient, including but not limited to agranulocytosis, aplastic anemia, seizures, rashes, retinopathy, and liver toxicity. Patient instructed to call the office should any adverse effect occur.  The patient verbalized understanding of the proper use and possible adverse effects of Plaquenil.  All the patient's questions and concerns were addressed. Cyclosporine Pregnancy And Lactation Text: This medication is Pregnancy Category C and it isn't know if it is safe during pregnancy. This medication is excreted in breast milk. SSKI Counseling:  I discussed with the patient the risks of SSKI including but not limited to thyroid abnormalities, metallic taste, GI upset, fever, headache, acne, arthralgias, paraesthesias, lymphadenopathy, easy bleeding, arrhythmias, and allergic reaction. Carac Pregnancy And Lactation Text: This medication is Pregnancy Category X and contraindicated in pregnancy and in women who may become pregnant. It is unknown if this medication is excreted in breast milk. Spironolactone Pregnancy And Lactation Text: This medication can cause feminization of the male fetus and should be avoided during pregnancy. The active metabolite is also found in breast milk. Humira Counseling:  I discussed with the patient the risks of adalimumab including but not limited to myelosuppression, immunosuppression, autoimmune hepatitis, demyelinating diseases, lymphoma, and serious infections.  The patient understands that monitoring is required including a PPD at baseline and must alert us or the primary physician if symptoms of infection or other concerning signs are noted. Ilumya Counseling: I discussed with the patient the risks of tildrakizumab including but not limited to immunosuppression, malignancy, posterior leukoencephalopathy syndrome, and serious infections.  The patient understands that monitoring is required including a PPD at baseline and must alert us or the primary physician if symptoms of infection or other concerning signs are noted. Dutasteride Male Counseling: Dutasteride Counseling:  I discussed with the patient the risks of use of dutasteride including but not limited to decreased libido, decreased ejaculate volume, and gynecomastia. Women who can become pregnant should not handle medication.  All of the patient's questions and concerns were addressed. High Dose Vitamin A Pregnancy And Lactation Text: High dose vitamin A therapy is contraindicated during pregnancy and breast feeding. Ketoconazole Counseling:   Patient counseled regarding improving absorption with orange juice.  Adverse effects include but are not limited to breast enlargement, headache, diarrhea, nausea, upset stomach, liver function test abnormalities, taste disturbance, and stomach pain.  There is a rare possibility of liver failure that can occur when taking ketoconazole. The patient understands that monitoring of LFTs may be required, especially at baseline. The patient verbalized understanding of the proper use and possible adverse effects of ketoconazole.  All of the patient's questions and concerns were addressed. Xolair Counseling:  Patient informed of potential adverse effects including but not limited to fever, muscle aches, rash and allergic reactions.  The patient verbalized understanding of the proper use and possible adverse effects of Xolair.  All of the patient's questions and concerns were addressed. Topical Sulfur Applications Pregnancy And Lactation Text: This medication is Pregnancy Category C and has an unknown safety profile during pregnancy. It is unknown if this topical medication is excreted in breast milk. Qbrexza Pregnancy And Lactation Text: There is no available data on Qbrexza use in pregnant women.  There is no available data on Qbrexza use in lactation. Hydroxyzine Pregnancy And Lactation Text: This medication is not safe during pregnancy and should not be taken. It is also excreted in breast milk and breast feeding isn't recommended. Cephalexin Counseling: I counseled the patient regarding use of cephalexin as an antibiotic for prophylactic and/or therapeutic purposes. Cephalexin (commonly prescribed under brand name Keflex) is a cephalosporin antibiotic which is active against numerous classes of bacteria, including most skin bacteria. Side effects may include nausea, diarrhea, gastrointestinal upset, rash, hives, yeast infections, and in rare cases, hepatitis, kidney disease, seizures, fever, confusion, neurologic symptoms, and others. Patients with severe allergies to penicillin medications are cautioned that there is about a 10% incidence of cross-reactivity with cephalosporins. When possible, patients with penicillin allergies should use alternatives to cephalosporins for antibiotic therapy. Mirvaso Counseling: Mirvaso is a topical medication which can decrease superficial blood flow where applied. Side effects are uncommon and include stinging, redness and allergic reactions. Metronidazole Pregnancy And Lactation Text: This medication is Pregnancy Category B and considered safe during pregnancy.  It is also excreted in breast milk. Olumiant Counseling: I discussed with the patient the risks of Olumiant therapy including but not limited to upper respiratory tract infections, shingles, cold sores, and nausea. Live vaccines should be avoided.  This medication has been linked to serious infections; higher rate of mortality; malignancy and lymphoproliferative disorders; major adverse cardiovascular events; thrombosis; gastrointestinal perforations; neutropenia; lymphopenia; anemia; liver enzyme elevations; and lipid elevations. Zyclara Counseling:  I discussed with the patient the risks of imiquimod including but not limited to erythema, scaling, itching, weeping, crusting, and pain.  Patient understands that the inflammatory response to imiquimod is variable from person to person and was educated regarded proper titration schedule.  If flu-like symptoms develop, patient knows to discontinue the medication and contact us. Calcipotriene Counseling:  I discussed with the patient the risks of calcipotriene including but not limited to erythema, scaling, itching, and irritation. Methotrexate Counseling:  Patient counseled regarding adverse effects of methotrexate including but not limited to nausea, vomiting, abnormalities in liver function tests. Patients may develop mouth sores, rash, diarrhea, and abnormalities in blood counts. The patient understands that monitoring is required including LFT's and blood counts.  There is a rare possibility of scarring of the liver and lung problems that can occur when taking methotrexate. Persistent nausea, loss of appetite, pale stools, dark urine, cough, and shortness of breath should be reported immediately. Patient advised to discontinue methotrexate treatment at least three months before attempting to become pregnant.  I discussed the need for folate supplements while taking methotrexate.  These supplements can decrease side effects during methotrexate treatment. The patient verbalized understanding of the proper use and possible adverse effects of methotrexate.  All of the patient's questions and concerns were addressed. Tazorac Counseling:  Patient advised that medication is irritating and drying.  Patient may need to apply sparingly and wash off after an hour before eventually leaving it on overnight.  The patient verbalized understanding of the proper use and possible adverse effects of tazorac.  All of the patient's questions and concerns were addressed. Oral Minoxidil Counseling- I discussed with the patient the risks of oral minoxidil including but not limited to shortness of breath, swelling of the feet or ankles, dizziness, lightheadedness, unwanted hair growth and allergic reaction.  The patient verbalized understanding of the proper use and possible adverse effects of oral minoxidil.  All of the patient's questions and concerns were addressed. Hydroxychloroquine Pregnancy And Lactation Text: This medication has been shown to cause fetal harm but it isn't assigned a Pregnancy Risk Category. There are small amounts excreted in breast milk. Topical Steroids Applications Pregnancy And Lactation Text: Most topical steroids are considered safe to use during pregnancy and lactation.  Any topical steroid applied to the breast or nipple should be washed off before breastfeeding. Tetracycline Counseling: Patient counseled regarding possible photosensitivity and increased risk for sunburn.  Patient instructed to avoid sunlight, if possible.  When exposed to sunlight, patients should wear protective clothing, sunglasses, and sunscreen.  The patient was instructed to call the office immediately if the following severe adverse effects occur:  hearing changes, easy bruising/bleeding, severe headache, or vision changes.  The patient verbalized understanding of the proper use and possible adverse effects of tetracycline.  All of the patient's questions and concerns were addressed. Patient understands to avoid pregnancy while on therapy due to potential birth defects. Colchicine Counseling:  Patient counseled regarding adverse effects including but not limited to stomach upset (nausea, vomiting, stomach pain, or diarrhea).  Patient instructed to limit alcohol consumption while taking this medication.  Colchicine may reduce blood counts especially with prolonged use.  The patient understands that monitoring of kidney function and blood counts may be required, especially at baseline. The patient verbalized understanding of the proper use and possible adverse effects of colchicine.  All of the patient's questions and concerns were addressed. Bimzelx Pregnancy And Lactation Text: This medication crosses the placenta and the safety is uncertain during pregnancy. It is unknown if this medication is present in breast milk. Dutasteride Female Counseling: Dutasteride Counseling:  I discussed with the patient the risks of use of dutasteride including but not limited to decreased libido and sexual dysfunction. Explained the teratogenic nature of the medication and stressed the importance of not getting pregnant during treatment. All of the patient's questions and concerns were addressed. Ketoconazole Pregnancy And Lactation Text: This medication is Pregnancy Category C and it isn't know if it is safe during pregnancy. It is also excreted in breast milk and breast feeding isn't recommended. Xolair Pregnancy And Lactation Text: This medication is Pregnancy Category B and is considered safe during pregnancy. This medication is excreted in breast milk. Sski Pregnancy And Lactation Text: This medication is Pregnancy Category D and isn't considered safe during pregnancy. It is excreted in breast milk. Olumiant Pregnancy And Lactation Text: Based on animal studies, Olumiant may cause embryo-fetal harm when administered to pregnant women.  The medication should not be used in pregnancy.  Breastfeeding is not recommended during treatment. Rhofade Counseling: Rhofade is a topical medication which can decrease superficial blood flow where applied. Side effects are uncommon and include stinging, redness and allergic reactions. Wartpeel Counseling:  I discussed with the patient the risks of Wartpeel including but not limited to erythema, scaling, itching, weeping, crusting, and pain. Calcipotriene Pregnancy And Lactation Text: The use of this medication during pregnancy or lactation is not recommended as there is insufficient data. Azathioprine Counseling:  I discussed with the patient the risks of azathioprine including but not limited to myelosuppression, immunosuppression, hepatotoxicity, lymphoma, and infections.  The patient understands that monitoring is required including baseline LFTs, Creatinine, possible TPMP genotyping and weekly CBCs for the first month and then every 2 weeks thereafter.  The patient verbalized understanding of the proper use and possible adverse effects of azathioprine.  All of the patient's questions and concerns were addressed. Skyrizi Counseling: I discussed with the patient the risks of risankizumab-rzaa including but not limited to immunosuppression, and serious infections.  The patient understands that monitoring is required including a PPD at baseline and must alert us or the primary physician if symptoms of infection or other concerning signs are noted. Mirvaso Pregnancy And Lactation Text: This medication has not been assigned a Pregnancy Risk Category. It is unknown if the medication is excreted in breast milk. Minocycline Counseling: Patient advised regarding possible photosensitivity and discoloration of the teeth, skin, lips, tongue and gums.  Patient instructed to avoid sunlight, if possible.  When exposed to sunlight, patients should wear protective clothing, sunglasses, and sunscreen.  The patient was instructed to call the office immediately if the following severe adverse effects occur:  hearing changes, easy bruising/bleeding, severe headache, or vision changes.  The patient verbalized understanding of the proper use and possible adverse effects of minocycline.  All of the patient's questions and concerns were addressed. Cimzia Counseling:  I discussed with the patient the risks of Cimzia including but not limited to immunosuppression, allergic reactions and infections.  The patient understands that monitoring is required including a PPD at baseline and must alert us or the primary physician if symptoms of infection or other concerning signs are noted. Low Dose Naltrexone Counseling- I discussed with the patient the potential risks and side effects of low dose naltrexone including but not limited to: more vivid dreams, headaches, nausea, vomiting, abdominal pain, fatigue, dizziness, and anxiety. Tazorac Pregnancy And Lactation Text: This medication is not safe during pregnancy. It is unknown if this medication is excreted in breast milk. Aklief counseling:  Patient advised to apply a pea-sized amount only at bedtime and wait 30 minutes after washing their face before applying.  If too drying, patient may add a non-comedogenic moisturizer.  The most commonly reported side effects including irritation, redness, scaling, dryness, stinging, burning, itching, and increased risk of sunburn.  The patient verbalized understanding of the proper use and possible adverse effects of retinoids.  All of the patient's questions and concerns were addressed. Topical Sulfur Applications Counseling: Topical Sulfur Counseling: Patient counseled that this medication may cause skin irritation or allergic reactions.  In the event of skin irritation, the patient was advised to reduce the amount of the drug applied or use it less frequently.   The patient verbalized understanding of the proper use and possible adverse effects of topical sulfur application.  All of the patient's questions and concerns were addressed. Terbinafine Counseling: Patient counseling regarding adverse effects of terbinafine including but not limited to headache, diarrhea, rash, upset stomach, liver function test abnormalities, itching, taste/smell disturbance, nausea, abdominal pain, and flatulence.  There is a rare possibility of liver failure that can occur when taking terbinafine.  The patient understands that a baseline LFT and kidney function test may be required. The patient verbalized understanding of the proper use and possible adverse effects of terbinafine.  All of the patient's questions and concerns were addressed. Albendazole Counseling:  I discussed with the patient the risks of albendazole including but not limited to cytopenia, kidney damage, nausea/vomiting and severe allergy.  The patient understands that this medication is being used in an off-label manner. Hydroquinone Counseling:  Patient advised that medication may result in skin irritation, lightening (hypopigmentation), dryness, and burning.  In the event of skin irritation, the patient was advised to reduce the amount of the drug applied or use it less frequently.  Rarely, spots that are treated with hydroquinone can become darker (pseudoochronosis).  Should this occur, patient instructed to stop medication and call the office. The patient verbalized understanding of the proper use and possible adverse effects of hydroquinone.  All of the patient's questions and concerns were addressed. Cantharidin Counseling:  I discussed with the patient the risks of Cantharidin including but not limited to pain, redness, burning, itching, and blistering. Cephalexin Pregnancy And Lactation Text: This medication is Pregnancy Category B and considered safe during pregnancy.  It is also excreted in breast milk but can be used safely for shorter doses. Methotrexate Pregnancy And Lactation Text: This medication is Pregnancy Category X and is known to cause fetal harm. This medication is excreted in breast milk. Oral Minoxidil Pregnancy And Lactation Text: This medication should only be used when clearly needed if you are pregnant, attempting to become pregnant or breast feeding. Dutasteride Pregnancy And Lactation Text: This medication is absolutely contraindicated in women, especially during pregnancy and breast feeding. Feminization of male fetuses is possible if taking while pregnant. Thalidomide Counseling: I discussed with the patient the risks of thalidomide including but not limited to birth defects, anxiety, weakness, chest pain, dizziness, cough and severe allergy. Cimzia Pregnancy And Lactation Text: This medication crosses the placenta but can be considered safe in certain situations. Cimzia may be excreted in breast milk. Opzelura Counseling:  I discussed with the patient the risks of Opzelura including but not limited to nasopharngitis, bronchitis, ear infection, eosinophila, hives, diarrhea, folliculitis, tonsillitis, and rhinorrhea.  Taken orally, this medication has been linked to serious infections; higher rate of mortality; malignancy and lymphoproliferative disorders; major adverse cardiovascular events; thrombosis; thrombocytopenia, anemia, and neutropenia; and lipid elevations. Rinvoq Counseling: I discussed with the patient the risks of Rinvoq therapy including but not limited to upper respiratory tract infections, shingles, cold sores, bronchitis, nausea, cough, fever, acne, and headache. Live vaccines should be avoided.  This medication has been linked to serious infections; higher rate of mortality; malignancy and lymphoproliferative disorders; major adverse cardiovascular events; thrombosis; thrombocytopenia, anemia, and neutropenia; lipid elevations; liver enzyme elevations; and gastrointestinal perforations. Erivedge Counseling- I discussed with the patient the risks of Erivedge including but not limited to nausea, vomiting, diarrhea, constipation, weight loss, changes in the sense of taste, decreased appetite, muscle spasms, and hair loss.  The patient verbalized understanding of the proper use and possible adverse effects of Erivedge.  All of the patient's questions and concerns were addressed. Acitretin Counseling:  I discussed with the patient the risks of acitretin including but not limited to hair loss, dry lips/skin/eyes, liver damage, hyperlipidemia, depression/suicidal ideation, photosensitivity.  Serious rare side effects can include but are not limited to pancreatitis, pseudotumor cerebri, bony changes, clot formation/stroke/heart attack.  Patient understands that alcohol is contraindicated since it can result in liver toxicity and significantly prolong the elimination of the drug by many years. Cantharidin Pregnancy And Lactation Text: This medication has not been proven safe during pregnancy. It is unknown if this medication is excreted in breast milk. Infliximab Counseling:  I discussed with the patient the risks of infliximab including but not limited to myelosuppression, immunosuppression, autoimmune hepatitis, demyelinating diseases, lymphoma, and serious infections.  The patient understands that monitoring is required including a PPD at baseline and must alert us or the primary physician if symptoms of infection or other concerning signs are noted. Low Dose Naltrexone Pregnancy And Lactation Text: Naltrexone is pregnancy category C.  There have been no adequate and well-controlled studies in pregnant women.  It should be used in pregnancy only if the potential benefit justifies the potential risk to the fetus.   Limited data indicates that naltrexone is minimally excreted into breastmilk. Otezla Counseling: The side effects of Otezla were discussed with the patient, including but not limited to worsening or new depression, weight loss, diarrhea, nausea, upper respiratory tract infection, and headache. Patient instructed to call the office should any adverse effect occur.  The patient verbalized understanding of the proper use and possible adverse effects of Otezla.  All the patient's questions and concerns were addressed. Aklief Pregnancy And Lactation Text: It is unknown if this medication is safe to use during pregnancy.  It is unknown if this medication is excreted in breast milk.  Breastfeeding women should use the topical cream on the smallest area of the skin for the shortest time needed while breastfeeding.  Do not apply to nipple and areola. Clindamycin Counseling: I counseled the patient regarding use of clindamycin as an antibiotic for prophylactic and/or therapeutic purposes. Clindamycin is active against numerous classes of bacteria, including skin bacteria. Side effects may include nausea, diarrhea, gastrointestinal upset, rash, hives, yeast infections, and in rare cases, colitis. Dapsone Counseling: I discussed with the patient the risks of dapsone including but not limited to hemolytic anemia, agranulocytosis, rashes, methemoglobinemia, kidney failure, peripheral neuropathy, headaches, GI upset, and liver toxicity.  Patients who start dapsone require monitoring including baseline LFTs and weekly CBCs for the first month, then every month thereafter.  The patient verbalized understanding of the proper use and possible adverse effects of dapsone.  All of the patient's questions and concerns were addressed. 5-Fu Counseling: 5-Fluorouracil Counseling:  I discussed with the patient the risks of 5-fluorouracil including but not limited to erythema, scaling, itching, weeping, crusting, and pain. Finasteride Male Counseling: Finasteride Counseling:  I discussed with the patient the risks of use of finasteride including but not limited to decreased libido, decreased ejaculate volume, gynecomastia, and depression. Women should not handle medication.  All of the patient's questions and concerns were addressed. Prednisone Counseling:  I discussed with the patient the risks of prolonged use of prednisone including but not limited to weight gain, insomnia, osteoporosis, mood changes, diabetes, susceptibility to infection, glaucoma and high blood pressure.  In cases where prednisone use is prolonged, patients should be monitored with blood pressure checks, serum glucose levels and an eye exam.  Additionally, the patient may need to be placed on GI prophylaxis, PCP prophylaxis, and calcium and vitamin D supplementation and/or a bisphosphonate.  The patient verbalized understanding of the proper use and the possible adverse effects of prednisone.  All of the patient's questions and concerns were addressed. Topical Clindamycin Counseling: Patient counseled that this medication may cause skin irritation or allergic reactions.  In the event of skin irritation, the patient was advised to reduce the amount of the drug applied or use it less frequently.   The patient verbalized understanding of the proper use and possible adverse effects of clindamycin.  All of the patient's questions and concerns were addressed. Winlevi Counseling:  I discussed with the patient the risks of topical clascoterone including but not limited to erythema, scaling, itching, and stinging. Patient voiced their understanding. Fluconazole Counseling:  Patient counseled regarding adverse effects of fluconazole including but not limited to headache, diarrhea, nausea, upset stomach, liver function test abnormalities, taste disturbance, and stomach pain.  There is a rare possibility of liver failure that can occur when taking fluconazole.  The patient understands that monitoring of LFTs and kidney function test may be required, especially at baseline. The patient verbalized understanding of the proper use and possible adverse effects of fluconazole.  All of the patient's questions and concerns were addressed. Stelara Counseling:  I discussed with the patient the risks of ustekinumab including but not limited to immunosuppression, malignancy, posterior leukoencephalopathy syndrome, and serious infections.  The patient understands that monitoring is required including a PPD at baseline and must alert us or the primary physician if symptoms of infection or other concerning signs are noted. Acitretin Pregnancy And Lactation Text: This medication is Pregnancy Category X and should not be given to women who are pregnant or may become pregnant in the future. This medication is excreted in breast milk. Opioid Counseling: I discussed with the patient the potential side effects of opioids including but not limited to addiction, altered mental status, and depression. I stressed avoiding alcohol, benzodiazepines, muscle relaxants and sleep aids unless specifically okayed by a physician. The patient verbalized understanding of the proper use and possible adverse effects of opioids. All of the patient's questions and concerns were addressed. They were instructed to flush the remaining pills down the toilet if they did not need them for pain. Clindamycin Pregnancy And Lactation Text: This medication can be used in pregnancy if certain situations. Clindamycin is also present in breast milk. Imiquimod Counseling:  I discussed with the patient the risks of imiquimod including but not limited to erythema, scaling, itching, weeping, crusting, and pain.  Patient understands that the inflammatory response to imiquimod is variable from person to person and was educated regarded proper titration schedule.  If flu-like symptoms develop, patient knows to discontinue the medication and contact us. Opzelura Pregnancy And Lactation Text: There is insufficient data to evaluate drug-associated risk for major birth defects, miscarriage, or other adverse maternal or fetal outcomes.  There is a pregnancy registry that monitors pregnancy outcomes in pregnant persons exposed to the medication during pregnancy.  It is unknown if this medication is excreted in breast milk.  Do not breastfeed during treatment and for about 4 weeks after the last dose. Quinolones Counseling:  I discussed with the patient the risks of fluoroquinolones including but not limited to GI upset, allergic reaction, drug rash, diarrhea, dizziness, photosensitivity, yeast infections, liver function test abnormalities, tendonitis/tendon rupture. Rinvoq Pregnancy And Lactation Text: Based on animal studies, Rinvoq may cause embryo-fetal harm when administered to pregnant women.  The medication should not be used in pregnancy.  Breastfeeding is not recommended during treatment and for 6 days after the last dose. Niacinamide Counseling: I recommended taking niacin or niacinamide, also know as vitamin B3, twice daily. Recent evidence suggests that taking vitamin B3 (500 mg twice daily) can reduce the risk of actinic keratoses and non-melanoma skin cancers. Side effects of vitamin B3 include flushing and headache. Cosentyx Counseling:  I discussed with the patient the risks of Cosentyx including but not limited to worsening of Crohn's disease, immunosuppression, allergic reactions and infections.  The patient understands that monitoring is required including a PPD at baseline and must alert us or the primary physician if symptoms of infection or other concerning signs are noted. Ivermectin Counseling:  Patient instructed to take medication on an empty stomach with a full glass of water.  Patient informed of potential adverse effects including but not limited to nausea, diarrhea, dizziness, itching, and swelling of the extremities or lymph nodes.  The patient verbalized understanding of the proper use and possible adverse effects of ivermectin.  All of the patient's questions and concerns were addressed. Dapsone Pregnancy And Lactation Text: This medication is Pregnancy Category C and is not considered safe during pregnancy or breast feeding. Otezla Pregnancy And Lactation Text: This medication is Pregnancy Category C and it isn't known if it is safe during pregnancy. It is unknown if it is excreted in breast milk. Azelaic Acid Counseling: Patient counseled that medicine may cause skin irritation and to avoid applying near the eyes.  In the event of skin irritation, the patient was advised to reduce the amount of the drug applied or use it less frequently.   The patient verbalized understanding of the proper use and possible adverse effects of azelaic acid.  All of the patient's questions and concerns were addressed. Cellcept Counseling:  I discussed with the patient the risks of mycophenolate mofetil including but not limited to infection/immunosuppression, GI upset, hypokalemia, hypercholesterolemia, bone marrow suppression, lymphoproliferative disorders, malignancy, GI ulceration/bleed/perforation, colitis, interstitial lung disease, kidney failure, progressive multifocal leukoencephalopathy, and birth defects.  The patient understands that monitoring is required including a baseline creatinine and regular CBC testing. In addition, patient must alert us immediately if symptoms of infection or other concerning signs are noted.

## 2024-03-08 ENCOUNTER — APPOINTMENT (OUTPATIENT)
Dept: UROLOGY | Facility: CLINIC | Age: 69
End: 2024-03-08

## 2024-03-13 ENCOUNTER — OUTPATIENT (OUTPATIENT)
Dept: OUTPATIENT SERVICES | Facility: HOSPITAL | Age: 69
LOS: 1 days | Discharge: HOME | End: 2024-03-13

## 2024-03-13 DIAGNOSIS — Z98.89 OTHER SPECIFIED POSTPROCEDURAL STATES: Chronic | ICD-10-CM

## 2024-03-13 DIAGNOSIS — M95.9 ACQUIRED DEFORMITY OF MUSCULOSKELETAL SYSTEM, UNSPECIFIED: Chronic | ICD-10-CM

## 2024-03-13 DIAGNOSIS — D75.81 MYELOFIBROSIS: ICD-10-CM

## 2024-03-14 ENCOUNTER — APPOINTMENT (OUTPATIENT)
Dept: HEMATOLOGY ONCOLOGY | Facility: CLINIC | Age: 69
End: 2024-03-14
Payer: COMMERCIAL

## 2024-03-14 DIAGNOSIS — Z85.528 PERSONAL HISTORY OF OTHER MALIGNANT NEOPLASM OF KIDNEY: ICD-10-CM

## 2024-03-14 DIAGNOSIS — D45 POLYCYTHEMIA VERA: ICD-10-CM

## 2024-03-14 PROCEDURE — 99205 OFFICE O/P NEW HI 60 MIN: CPT

## 2024-03-14 NOTE — REASON FOR VISIT
[Initial Consultation] : an initial consultation for [Myeloproliferative Disorder] : myeloproliferative disorder [Spouse] : spouse

## 2024-03-19 ENCOUNTER — RX RENEWAL (OUTPATIENT)
Age: 69
End: 2024-03-19

## 2024-03-19 RX ORDER — FINASTERIDE 5 MG/1
5 TABLET, FILM COATED ORAL DAILY
Qty: 90 | Refills: 3 | Status: ACTIVE | COMMUNITY
Start: 2021-04-19 | End: 1900-01-01

## 2024-03-19 NOTE — PHYSICAL EXAM
[Fully active, able to carry on all pre-disease performance without restriction] : Status 0 - Fully active, able to carry on all pre-disease performance without restriction [de-identified] : TELE VISIT - NO PHYSICAL EXAM PERFORMED

## 2024-03-19 NOTE — END OF VISIT
[Fellow] : Fellow [] : Fellow [Time Spent: ___ minutes] : I have spent [unfilled] minutes of time on the encounter. [FreeTextEntry3] : Patient seen with fellow who documented the visit. I reviewed and edited the note as needed. [>50% of the face to face encounter time was spent on counseling and/or coordination of care for ___] : Greater than 50% of the face to face encounter time was spent on counseling and/or coordination of care for [unfilled]

## 2024-03-19 NOTE — HISTORY OF PRESENT ILLNESS
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  He was originally diagnosed with JAK2+ PV in 1998 and was followed by Dr. Jefe Dejesus at that time. He required phlebotomy and was placed on hydroxyurea. His time was c/b pruritus, neuropathy, and fluctuating counts. In 2015, his platelet counts were increasing to 1 million and he was started on Jakafi. He was then followed by Danny Beard (his current Hematologist) at [facility]. After starting Jakafi, his disease was more controlled and his pruritis and neuropathy improved. In 2023, he began to have dizziness and worsening anemia. Jakafi was slowly decreased from 15 --> 10 --> 5 mg, but the patient still had persistent anemia with Hb dropping to ~8-9. Jakafi was stopped in October 2023 and a BMBx was performed on 11/24/24, which showed grade 3 myelofibrosis (MF3); no increase in blasts; trilineage hematopoiesis with increased but pleomorphic megakaryocytes. He comes to consult for a 2nd opinion. His Hb has improved since being off the Jakafi to about 10. He has not had splenomegaly at the time of this consultation.  PSH:  Bladder surgery, Mohs surgery, nephrectomy FH: CHF, CKD SH: Lives with his wife. Nonsmoker, no significant alcohol use  At the time of consultation, he is feeling very well with good energy level and no complaints.

## 2024-03-19 NOTE — ASSESSMENT
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 He does not have splenomegaly, his counts are acceptable, and he feels well without significant symptoms.  [] Secondary Myelofibrosis, Grade 3 - On BMBx 11/24/24 From progression from JAK2+ PV after at least 25 years - Currently off of medication, progressed to MF while on Jakafi from [id number] - Currently recommend watch strategy, as no active treatment for disease and pt is asymptomatic. No known clinical trials available to my knowledge but will research - However, would recommend consultation with a transplant physician at a tertiary center experienced in transplanting MPNs. Discussed names of such centers with pt and his wife - Otherwise, continued monitoring of counts and symptoms by his Hematologist at [facility] (Danny Beard) - Discussed with pt to RTO or call with questions   Patient seen with and plan discussed with Dr. Shakil Aryles Hedjar, MD, PGY-6 Hematology/Oncology Fellow [facility]

## 2024-03-22 ENCOUNTER — APPOINTMENT (OUTPATIENT)
Dept: UROLOGY | Facility: CLINIC | Age: 69
End: 2024-03-22

## 2024-03-29 ENCOUNTER — APPOINTMENT (OUTPATIENT)
Dept: UROLOGY | Facility: CLINIC | Age: 69
End: 2024-03-29

## 2024-04-16 RX ORDER — RIVAROXABAN 20 MG/1
20 TABLET, FILM COATED ORAL
Qty: 90 | Refills: 1 | Status: ACTIVE | COMMUNITY
Start: 2021-01-24 | End: 1900-01-01

## 2024-04-21 ENCOUNTER — RX RENEWAL (OUTPATIENT)
Age: 69
End: 2024-04-21

## 2024-04-21 RX ORDER — FLECAINIDE ACETATE 100 MG/1
100 TABLET ORAL
Qty: 60 | Refills: 3 | Status: ACTIVE | COMMUNITY
Start: 2023-08-29 | End: 1900-01-01

## 2024-05-10 ENCOUNTER — APPOINTMENT (OUTPATIENT)
Dept: FAMILY MEDICINE | Facility: CLINIC | Age: 69
End: 2024-05-10
Payer: COMMERCIAL

## 2024-05-10 VITALS
HEART RATE: 61 BPM | WEIGHT: 208 LBS | BODY MASS INDEX: 27.57 KG/M2 | HEIGHT: 73 IN | DIASTOLIC BLOOD PRESSURE: 79 MMHG | OXYGEN SATURATION: 100 % | SYSTOLIC BLOOD PRESSURE: 150 MMHG

## 2024-05-10 DIAGNOSIS — D75.81 MYELOFIBROSIS: ICD-10-CM

## 2024-05-10 DIAGNOSIS — R05.9 COUGH, UNSPECIFIED: ICD-10-CM

## 2024-05-10 DIAGNOSIS — B34.9 VIRAL INFECTION, UNSPECIFIED: ICD-10-CM

## 2024-05-10 DIAGNOSIS — I48.91 UNSPECIFIED ATRIAL FIBRILLATION: ICD-10-CM

## 2024-05-10 DIAGNOSIS — Z01.818 ENCOUNTER FOR OTHER PREPROCEDURAL EXAMINATION: ICD-10-CM

## 2024-05-10 PROCEDURE — 99203 OFFICE O/P NEW LOW 30 MIN: CPT

## 2024-05-10 RX ORDER — GUAIFENESIN AND DEXTROMETHORPHAN HYDROBROMIDE 600; 30 MG/1; MG/1
30-600 TABLET, EXTENDED RELEASE ORAL
Refills: 0 | Status: ACTIVE | COMMUNITY

## 2024-05-10 RX ORDER — MONTELUKAST 10 MG/1
10 TABLET, FILM COATED ORAL
Qty: 1 | Refills: 2 | Status: ACTIVE | COMMUNITY
Start: 2024-05-10 | End: 1900-01-01

## 2024-05-10 RX ORDER — BENZONATATE 200 MG/1
200 CAPSULE ORAL
Qty: 30 | Refills: 0 | Status: ACTIVE | COMMUNITY
Start: 2024-05-10 | End: 1900-01-01

## 2024-05-10 RX ORDER — METOPROLOL SUCCINATE 25 MG/1
25 TABLET, EXTENDED RELEASE ORAL
Refills: 0 | Status: DISCONTINUED | COMMUNITY
End: 2024-05-10

## 2024-05-10 RX ORDER — AZITHROMYCIN 250 MG/1
250 TABLET, FILM COATED ORAL
Qty: 1 | Refills: 0 | Status: ACTIVE | COMMUNITY
Start: 2024-05-10 | End: 1900-01-01

## 2024-05-10 NOTE — HISTORY OF PRESENT ILLNESS
[FreeTextEntry8] : pt w/ extensive medical hx including myelofibrosis, PV, afib, kidney ca, htn comes in c/o throat discomfort which started 1 wk ago after returning from [place], then progressed to sinus congestion, and now feels it's settling into chest w/ persistent cough, clear mucous states went to ENT 4 days ago, no sinus infection, no abx given, suggested steroids, pt deferring nonsmoker

## 2024-05-10 NOTE — PLAN
[FreeTextEntry1] : montelukast tessalon perles zpak if no improvement in 2-3 days advised natural course of common cold/virus

## 2024-05-10 NOTE — PHYSICAL EXAM
[No Acute Distress] : no acute distress [Normal Sclera/Conjunctiva] : normal sclera/conjunctiva [EOMI] : extraocular movements intact [Normal Outer Ear/Nose] : the outer ears and nose were normal in appearance [Normal Oropharynx] : the oropharynx was normal [No JVD] : no jugular venous distention [Normal] : normal rate, regular rhythm, normal S1 and S2 and no murmur heard [de-identified] : mildly bulging tms

## 2024-05-12 ENCOUNTER — APPOINTMENT (OUTPATIENT)
Dept: FAMILY MEDICINE | Facility: CLINIC | Age: 69
End: 2024-05-12

## 2024-06-03 ENCOUNTER — APPOINTMENT (OUTPATIENT)
Dept: ORTHOPEDIC SURGERY | Facility: CLINIC | Age: 69
End: 2024-06-03
Payer: COMMERCIAL

## 2024-06-03 DIAGNOSIS — S60.051A CONTUSION OF RIGHT LITTLE FINGER W/OUT DAMAGE TO NAIL, INITIAL ENCOUNTER: ICD-10-CM

## 2024-06-03 PROCEDURE — 99203 OFFICE O/P NEW LOW 30 MIN: CPT

## 2024-06-03 PROCEDURE — 73140 X-RAY EXAM OF FINGER(S): CPT | Mod: LT

## 2024-06-03 NOTE — HISTORY OF PRESENT ILLNESS
[de-identified] : 06/03/2024: Patient is here for evaluation of left pinky after finger got stuck in door handle 4 days ago. Pt states he began feeling pain at DIP joint few days after injury, however, states pain has since resolved. Patient reports mild limited ROM, denies n/t.

## 2024-06-03 NOTE — IMAGING
[de-identified] : Right Little finger Exam: Full ROM, flexion, extension Non-tender at DIP, PIP joints No numbness or tingling in right hand  Xray right little finger 2 views: No fracture noted

## 2024-06-03 NOTE — ASSESSMENT
[FreeTextEntry1] : The patient was advised of the diagnosis. The natural history of the pathology was explained in full to the patient in layman's terms. All questions were answered. The risks and benefits of surgical and non-surgical treatment alternatives were explained in full to the patient.  - No tenderness on exam, Xrays without fx - Follow up PRN

## 2024-06-09 ENCOUNTER — NON-APPOINTMENT (OUTPATIENT)
Age: 69
End: 2024-06-09

## 2024-06-09 ENCOUNTER — APPOINTMENT (OUTPATIENT)
Dept: CARDIOLOGY | Facility: CLINIC | Age: 69
End: 2024-06-09
Payer: COMMERCIAL

## 2024-06-09 VITALS
WEIGHT: 208 LBS | DIASTOLIC BLOOD PRESSURE: 70 MMHG | HEART RATE: 68 BPM | HEIGHT: 73 IN | SYSTOLIC BLOOD PRESSURE: 127 MMHG | OXYGEN SATURATION: 98 % | BODY MASS INDEX: 27.57 KG/M2

## 2024-06-09 DIAGNOSIS — Z98.890 OTHER SPECIFIED POSTPROCEDURAL STATES: ICD-10-CM

## 2024-06-09 DIAGNOSIS — I10 ESSENTIAL (PRIMARY) HYPERTENSION: ICD-10-CM

## 2024-06-09 DIAGNOSIS — I25.10 ATHEROSCLEROTIC HEART DISEASE OF NATIVE CORONARY ARTERY W/OUT ANGINA PECTORIS: ICD-10-CM

## 2024-06-09 DIAGNOSIS — Z86.79 OTHER SPECIFIED POSTPROCEDURAL STATES: ICD-10-CM

## 2024-06-09 DIAGNOSIS — E78.5 HYPERLIPIDEMIA, UNSPECIFIED: ICD-10-CM

## 2024-06-09 PROCEDURE — 93000 ELECTROCARDIOGRAM COMPLETE: CPT

## 2024-06-09 PROCEDURE — G2211 COMPLEX E/M VISIT ADD ON: CPT | Mod: NC

## 2024-06-09 PROCEDURE — 99214 OFFICE O/P EST MOD 30 MIN: CPT | Mod: 25

## 2024-06-09 NOTE — PHYSICAL EXAM
[Well Developed] : well developed [Well Nourished] : well nourished [No Acute Distress] : no acute distress [Normal Conjunctiva] : normal conjunctiva [Normal Venous Pressure] : normal venous pressure [No Carotid Bruit] : no carotid bruit [Normal S1, S2] : normal S1, S2 [No Murmur] : no murmur [No Rub] : no rub [No Gallop] : no gallop [Clear Lung Fields] : clear lung fields [Good Air Entry] : good air entry [No Respiratory Distress] : no respiratory distress  [Soft] : abdomen soft [Non Tender] : non-tender [Normal Bowel Sounds] : normal bowel sounds [No Masses/organomegaly] : no masses/organomegaly [Normal Gait] : normal gait [No Cyanosis] : no cyanosis [No Clubbing] : no clubbing [No Varicosities] : no varicosities [No Rash] : no rash [No Skin Lesions] : no skin lesions [Moves all extremities] : moves all extremities [No Focal Deficits] : no focal deficits [Normal Speech] : normal speech [Alert and Oriented] : alert and oriented [Normal memory] : normal memory [Edema ___] : edema [unfilled]

## 2024-06-09 NOTE — DISCUSSION/SUMMARY
[FreeTextEntry1] : PAF: S/p ablation, doing great. Has maintained SR  Continue metoprolol 12.5mg daily Remains on flecainide for the time being, clarify long term plan with EP  Continue Xarelto  HTN: BP great, cont Toprol  HLD: Markedly improved on Crestor. Cont statin  Mild CAD: Cont statin, on Xarelto   Last echo 7/2022- will repeat. Pt with bilateral LE edema (chronically)  Pt is pending possible stem cell transplant for myelofibrosis. He would have to stop Xarelto prior. The benefits of the potentially lifesaving procedure outweigh the small risk of CVA when stopping Xarelto in a patient with a relatively low [id number] score (2) and post ablation without evidence of recurrence.   RV 6M

## 2024-06-09 NOTE — HISTORY OF PRESENT ILLNESS
[FreeTextEntry1] : 69M with pAF s/p ablation 12/27/22, HTN, now with myelofibrosis (secondary to P. Vera)   BM bx with myelofibrosis, not P. Vera and currently off Jakafi Seeing heme, pending possible stem cell transplant, would have to stop Xarelto for a prolonged period of time Feeling very well overall Denies CP, dyspnea, lightheadedness  No further Afib by symptoms or by Apple Watch On Xarelto, no bleeding issues Using oral device for SHRAVAN   HISTORY:  Generally cannot tell when he is in Afib Had AFib post op 2013, short course of asa which was stopped. Subsequently noted to be in Afib in 2018, started on Xarelto. No bleeding complications from Xarelto  Had been on Cardizem first, then metoprolol was added due to poorly controlled HR Pt s/p PVI, CTI ablation for AFib 12/27/22 Had AFib recurrence post ablation, spontaneous converted to SR without any issues  Never smoker. Father had CAD later in life. Works as a  for [employer].   ECG: Sinus bradycardia at 49 TTE 2021: 60-65%, mild MAC, mild MR NST: EF 55%, no ischemia ZIO: 16% AF burden, NSVT CTA: CAC score 21, 20% LCx, short mid LAD bridge, small pericardial effusion   Xarelto 20mg  Rosuvastatin 5mg  Toprol 12.5mg once a day Flecainide 50mg

## 2024-06-21 ENCOUNTER — RX RENEWAL (OUTPATIENT)
Age: 69
End: 2024-06-21

## 2024-06-21 RX ORDER — TAMSULOSIN HYDROCHLORIDE 0.4 MG/1
0.4 CAPSULE ORAL
Qty: 90 | Refills: 3 | Status: ACTIVE | COMMUNITY
Start: 2020-01-03 | End: 1900-01-01

## 2024-07-30 ENCOUNTER — APPOINTMENT (OUTPATIENT)
Dept: INTERNAL MEDICINE | Facility: CLINIC | Age: 69
End: 2024-07-30

## 2024-08-15 ENCOUNTER — APPOINTMENT (OUTPATIENT)
Dept: UROLOGY | Facility: CLINIC | Age: 69
End: 2024-08-15

## 2024-08-26 ENCOUNTER — RX RENEWAL (OUTPATIENT)
Age: 69
End: 2024-08-26

## 2024-08-29 ENCOUNTER — RX RENEWAL (OUTPATIENT)
Age: 69
End: 2024-08-29

## 2024-10-14 NOTE — H&P ADULT - NSHPLANGLIMITEDENGLISH_GEN_A_CORE
Called and scheduled VV for pt on 11/18.    Labs were ordered by Dr Bolton on 10/8 for CMP and TSH    Pt states she had labwork completed on 10/7 which included TSH and BMP.    Dr Bolton- do you want pt to have TSH and CMP prior to next visit?   
Dr. Bolton,     Called pt and was notified she wants to have an appt since she has not been feeling well and wants to discuss in visit. Pt requesting VV.     LOV 1/10/2024, RTC 1 year but pt wants to be seen in Nov since she has meet her deductible. Please advise, thanks!   
Hi!  Yes, please book appt for her as VV. Thank you!  
MC sent advising patient to complete labs one week prior to VV on 11/18/24  
Patient is wondering if she can have a tele health appointment in November. The schedule is booked please call   
Yes, I have also added vitamin D.  
No

## 2024-11-08 RX ORDER — TORSEMIDE 20 MG/1
20 TABLET ORAL DAILY
Qty: 14 | Refills: 0 | Status: ACTIVE | COMMUNITY
Start: 2024-11-08 | End: 1900-01-01

## 2024-12-10 ENCOUNTER — RX RENEWAL (OUTPATIENT)
Age: 69
End: 2024-12-10

## 2024-12-11 ENCOUNTER — APPOINTMENT (OUTPATIENT)
Dept: PULMONOLOGY | Facility: CLINIC | Age: 69
End: 2024-12-11
Payer: MEDICARE

## 2024-12-11 VITALS
OXYGEN SATURATION: 94 % | RESPIRATION RATE: 16 BRPM | WEIGHT: 203 LBS | HEIGHT: 73 IN | SYSTOLIC BLOOD PRESSURE: 127 MMHG | BODY MASS INDEX: 26.9 KG/M2 | DIASTOLIC BLOOD PRESSURE: 60 MMHG | HEART RATE: 79 BPM

## 2024-12-11 DIAGNOSIS — R06.00 DYSPNEA, UNSPECIFIED: ICD-10-CM

## 2024-12-11 DIAGNOSIS — R05.3 CHRONIC COUGH: ICD-10-CM

## 2024-12-11 DIAGNOSIS — R06.89 DYSPNEA, UNSPECIFIED: ICD-10-CM

## 2024-12-11 PROCEDURE — 94729 DIFFUSING CAPACITY: CPT

## 2024-12-11 PROCEDURE — 99204 OFFICE O/P NEW MOD 45 MIN: CPT | Mod: 25

## 2024-12-11 PROCEDURE — 94060 EVALUATION OF WHEEZING: CPT

## 2024-12-11 PROCEDURE — 94726 PLETHYSMOGRAPHY LUNG VOLUMES: CPT

## 2024-12-12 ENCOUNTER — APPOINTMENT (OUTPATIENT)
Dept: CARDIOLOGY | Facility: CLINIC | Age: 69
End: 2024-12-12
Payer: MEDICARE

## 2024-12-12 ENCOUNTER — APPOINTMENT (OUTPATIENT)
Dept: INTERNAL MEDICINE | Facility: CLINIC | Age: 69
End: 2024-12-12
Payer: MEDICARE

## 2024-12-12 VITALS
HEIGHT: 73 IN | BODY MASS INDEX: 26.9 KG/M2 | HEART RATE: 71 BPM | OXYGEN SATURATION: 98 % | SYSTOLIC BLOOD PRESSURE: 122 MMHG | DIASTOLIC BLOOD PRESSURE: 62 MMHG | WEIGHT: 203 LBS

## 2024-12-12 DIAGNOSIS — I10 ESSENTIAL (PRIMARY) HYPERTENSION: ICD-10-CM

## 2024-12-12 DIAGNOSIS — Z98.890 OTHER SPECIFIED POSTPROCEDURAL STATES: ICD-10-CM

## 2024-12-12 DIAGNOSIS — E87.70 FLUID OVERLOAD, UNSPECIFIED: ICD-10-CM

## 2024-12-12 DIAGNOSIS — Z86.79 OTHER SPECIFIED POSTPROCEDURAL STATES: ICD-10-CM

## 2024-12-12 DIAGNOSIS — I25.10 ATHEROSCLEROTIC HEART DISEASE OF NATIVE CORONARY ARTERY W/OUT ANGINA PECTORIS: ICD-10-CM

## 2024-12-12 PROBLEM — R05.3 CHRONIC COUGH: Status: ACTIVE | Noted: 2024-12-12

## 2024-12-12 PROCEDURE — 93306 TTE W/DOPPLER COMPLETE: CPT

## 2024-12-12 PROCEDURE — G2211 COMPLEX E/M VISIT ADD ON: CPT

## 2024-12-12 PROCEDURE — 99204 OFFICE O/P NEW MOD 45 MIN: CPT

## 2024-12-12 RX ORDER — TORSEMIDE 20 MG/1
20 TABLET ORAL DAILY
Qty: 30 | Refills: 2 | Status: ACTIVE | COMMUNITY
Start: 2024-12-12 | End: 1900-01-01

## 2024-12-16 ENCOUNTER — NON-APPOINTMENT (OUTPATIENT)
Age: 69
End: 2024-12-16

## 2024-12-17 ENCOUNTER — NON-APPOINTMENT (OUTPATIENT)
Age: 69
End: 2024-12-17

## 2024-12-19 ENCOUNTER — NON-APPOINTMENT (OUTPATIENT)
Age: 69
End: 2024-12-19

## 2024-12-20 ENCOUNTER — APPOINTMENT (OUTPATIENT)
Dept: INTERNAL MEDICINE | Facility: CLINIC | Age: 69
End: 2024-12-20

## 2025-01-09 ENCOUNTER — OUTPATIENT (OUTPATIENT)
Dept: OUTPATIENT SERVICES | Facility: HOSPITAL | Age: 70
LOS: 1 days | End: 2025-01-09
Payer: COMMERCIAL

## 2025-01-09 ENCOUNTER — APPOINTMENT (OUTPATIENT)
Dept: CT IMAGING | Facility: CLINIC | Age: 70
End: 2025-01-09

## 2025-01-09 DIAGNOSIS — R05.3 CHRONIC COUGH: ICD-10-CM

## 2025-01-09 DIAGNOSIS — Z98.89 OTHER SPECIFIED POSTPROCEDURAL STATES: Chronic | ICD-10-CM

## 2025-01-09 DIAGNOSIS — M95.9 ACQUIRED DEFORMITY OF MUSCULOSKELETAL SYSTEM, UNSPECIFIED: Chronic | ICD-10-CM

## 2025-01-09 PROCEDURE — 71250 CT THORAX DX C-: CPT | Mod: 26

## 2025-01-09 PROCEDURE — 71250 CT THORAX DX C-: CPT

## 2025-01-14 ENCOUNTER — APPOINTMENT (OUTPATIENT)
Dept: PULMONOLOGY | Facility: CLINIC | Age: 70
End: 2025-01-14
Payer: MEDICARE

## 2025-01-14 ENCOUNTER — NON-APPOINTMENT (OUTPATIENT)
Age: 70
End: 2025-01-14

## 2025-01-14 DIAGNOSIS — J98.4 OTHER DISORDERS OF LUNG: ICD-10-CM

## 2025-01-14 PROCEDURE — 99213 OFFICE O/P EST LOW 20 MIN: CPT

## 2025-01-14 RX ORDER — HYDROCODONE BITARTRATE AND HOMATROPINE METHYLBROMIDE 1.5; 5 MG/5ML; MG/5ML
5-1.5 SOLUTION ORAL 3 TIMES DAILY
Qty: 1 | Refills: 0 | Status: ACTIVE | COMMUNITY
Start: 2025-01-14 | End: 1900-01-01

## 2025-01-14 RX ORDER — ALBUTEROL SULFATE 90 UG/1
108 (90 BASE) INHALANT RESPIRATORY (INHALATION)
Qty: 1 | Refills: 2 | Status: ACTIVE | COMMUNITY
Start: 2025-01-14 | End: 1900-01-01

## 2025-01-15 RX ORDER — FLUTICASONE PROPIONATE AND SALMETEROL XINAFOATE 230; 21 UG/1; UG/1
230-21 AEROSOL, METERED RESPIRATORY (INHALATION)
Qty: 12 | Refills: 2 | Status: ACTIVE | COMMUNITY
Start: 2025-01-15 | End: 1900-01-01

## 2025-01-15 RX ORDER — FLUTICASONE PROPIONATE AND SALMETEROL 50; 100 UG/1; UG/1
100-50 POWDER RESPIRATORY (INHALATION) TWICE DAILY
Qty: 1 | Refills: 3 | Status: DISCONTINUED | COMMUNITY
Start: 2025-01-14 | End: 2025-01-15

## 2025-01-21 ENCOUNTER — APPOINTMENT (OUTPATIENT)
Dept: UROLOGY | Facility: CLINIC | Age: 70
End: 2025-01-21
Payer: MEDICARE

## 2025-01-21 ENCOUNTER — OUTPATIENT (OUTPATIENT)
Dept: OUTPATIENT SERVICES | Facility: HOSPITAL | Age: 70
LOS: 1 days | End: 2025-01-21
Payer: MEDICARE

## 2025-01-21 DIAGNOSIS — R35.0 FREQUENCY OF MICTURITION: ICD-10-CM

## 2025-01-21 DIAGNOSIS — Z98.89 OTHER SPECIFIED POSTPROCEDURAL STATES: Chronic | ICD-10-CM

## 2025-01-21 DIAGNOSIS — M95.9 ACQUIRED DEFORMITY OF MUSCULOSKELETAL SYSTEM, UNSPECIFIED: Chronic | ICD-10-CM

## 2025-01-21 DIAGNOSIS — Z85.51 PERSONAL HISTORY OF MALIGNANT NEOPLASM OF BLADDER: ICD-10-CM

## 2025-01-21 PROCEDURE — 52000 CYSTOURETHROSCOPY: CPT

## 2025-01-21 RX ORDER — INHALER, ASSIST DEVICES
SPACER (EA) MISCELLANEOUS
Qty: 1 | Refills: 0 | Status: ACTIVE | COMMUNITY
Start: 2025-01-21 | End: 1900-01-01

## 2025-01-22 DIAGNOSIS — Z85.51 PERSONAL HISTORY OF MALIGNANT NEOPLASM OF BLADDER: ICD-10-CM

## 2025-01-23 LAB — URINE CYTOLOGY: NORMAL

## 2025-01-24 ENCOUNTER — APPOINTMENT (OUTPATIENT)
Dept: PULMONOLOGY | Facility: CLINIC | Age: 70
End: 2025-01-24
Payer: MEDICARE

## 2025-01-24 PROCEDURE — 99214 OFFICE O/P EST MOD 30 MIN: CPT | Mod: 2W

## 2025-02-17 ENCOUNTER — APPOINTMENT (OUTPATIENT)
Dept: CARDIOLOGY | Facility: CLINIC | Age: 70
End: 2025-02-17
Payer: MEDICARE

## 2025-02-17 VITALS
OXYGEN SATURATION: 98 % | DIASTOLIC BLOOD PRESSURE: 60 MMHG | HEIGHT: 73 IN | BODY MASS INDEX: 26.9 KG/M2 | HEART RATE: 79 BPM | WEIGHT: 203 LBS | SYSTOLIC BLOOD PRESSURE: 122 MMHG

## 2025-02-17 DIAGNOSIS — Z98.890 OTHER SPECIFIED POSTPROCEDURAL STATES: ICD-10-CM

## 2025-02-17 DIAGNOSIS — Z86.79 OTHER SPECIFIED POSTPROCEDURAL STATES: ICD-10-CM

## 2025-02-17 DIAGNOSIS — I10 ESSENTIAL (PRIMARY) HYPERTENSION: ICD-10-CM

## 2025-02-17 DIAGNOSIS — I25.10 ATHEROSCLEROTIC HEART DISEASE OF NATIVE CORONARY ARTERY W/OUT ANGINA PECTORIS: ICD-10-CM

## 2025-02-17 DIAGNOSIS — R60.0 LOCALIZED EDEMA: ICD-10-CM

## 2025-02-17 PROCEDURE — 99214 OFFICE O/P EST MOD 30 MIN: CPT

## 2025-02-17 PROCEDURE — G2211 COMPLEX E/M VISIT ADD ON: CPT

## 2025-02-24 ENCOUNTER — RX RENEWAL (OUTPATIENT)
Age: 70
End: 2025-02-24

## 2025-03-03 ENCOUNTER — NON-APPOINTMENT (OUTPATIENT)
Age: 70
End: 2025-03-03

## 2025-03-03 ENCOUNTER — APPOINTMENT (OUTPATIENT)
Dept: PULMONOLOGY | Facility: CLINIC | Age: 70
End: 2025-03-03
Payer: MEDICARE

## 2025-03-03 VITALS — DIASTOLIC BLOOD PRESSURE: 65 MMHG | HEART RATE: 93 BPM | OXYGEN SATURATION: 96 % | SYSTOLIC BLOOD PRESSURE: 130 MMHG

## 2025-03-03 DIAGNOSIS — G47.33 OBSTRUCTIVE SLEEP APNEA (ADULT) (PEDIATRIC): ICD-10-CM

## 2025-03-03 DIAGNOSIS — D45 POLYCYTHEMIA VERA: ICD-10-CM

## 2025-03-03 DIAGNOSIS — R06.83 SNORING: ICD-10-CM

## 2025-03-03 DIAGNOSIS — R05.3 CHRONIC COUGH: ICD-10-CM

## 2025-03-03 PROCEDURE — 94060 EVALUATION OF WHEEZING: CPT

## 2025-03-03 PROCEDURE — 99214 OFFICE O/P EST MOD 30 MIN: CPT | Mod: 25

## 2025-03-03 RX ORDER — OMEPRAZOLE 40 MG/1
40 CAPSULE, DELAYED RELEASE ORAL
Qty: 60 | Refills: 2 | Status: ACTIVE | COMMUNITY
Start: 2025-03-03 | End: 1900-01-01

## 2025-03-10 ENCOUNTER — RX RENEWAL (OUTPATIENT)
Age: 70
End: 2025-03-10

## 2025-03-18 ENCOUNTER — NON-APPOINTMENT (OUTPATIENT)
Age: 70
End: 2025-03-18

## 2025-03-19 LAB
APPEARANCE: ABNORMAL
BACTERIA: NEGATIVE /HPF
BILIRUBIN URINE: ABNORMAL
BLOOD URINE: ABNORMAL
CAST: NORMAL /LPF
COLOR: ABNORMAL
EPITHELIAL CELLS: 2 /HPF
GLUCOSE QUALITATIVE U: NEGATIVE MG/DL
KETONES URINE: NEGATIVE MG/DL
LEUKOCYTE ESTERASE URINE: ABNORMAL
MICROSCOPIC-UA: NORMAL
NITRITE URINE: NEGATIVE
PH URINE: 6.5
PROTEIN URINE: 100 MG/DL
RED BLOOD CELLS URINE: >1900 /HPF
REVIEW: NORMAL
SPECIFIC GRAVITY URINE: 1.03
UROBILINOGEN URINE: 0.2 MG/DL
WHITE BLOOD CELLS URINE: 5 /HPF

## 2025-03-20 ENCOUNTER — OUTPATIENT (OUTPATIENT)
Dept: OUTPATIENT SERVICES | Facility: HOSPITAL | Age: 70
LOS: 1 days | End: 2025-03-20
Payer: MEDICARE

## 2025-03-20 ENCOUNTER — NON-APPOINTMENT (OUTPATIENT)
Age: 70
End: 2025-03-20

## 2025-03-20 ENCOUNTER — APPOINTMENT (OUTPATIENT)
Dept: ULTRASOUND IMAGING | Facility: CLINIC | Age: 70
End: 2025-03-20
Payer: MEDICARE

## 2025-03-20 DIAGNOSIS — M95.9 ACQUIRED DEFORMITY OF MUSCULOSKELETAL SYSTEM, UNSPECIFIED: Chronic | ICD-10-CM

## 2025-03-20 DIAGNOSIS — Z98.89 OTHER SPECIFIED POSTPROCEDURAL STATES: Chronic | ICD-10-CM

## 2025-03-20 DIAGNOSIS — Z85.528 PERSONAL HISTORY OF OTHER MALIGNANT NEOPLASM OF KIDNEY: ICD-10-CM

## 2025-03-20 LAB — BACTERIA UR CULT: NORMAL

## 2025-03-20 PROCEDURE — 76770 US EXAM ABDO BACK WALL COMP: CPT | Mod: 26

## 2025-03-20 PROCEDURE — 76770 US EXAM ABDO BACK WALL COMP: CPT

## 2025-03-21 LAB — URINE CYTOLOGY: NORMAL

## 2025-03-27 ENCOUNTER — APPOINTMENT (OUTPATIENT)
Dept: UROLOGY | Facility: CLINIC | Age: 70
End: 2025-03-27

## 2025-03-27 ENCOUNTER — OUTPATIENT (OUTPATIENT)
Dept: OUTPATIENT SERVICES | Facility: HOSPITAL | Age: 70
LOS: 1 days | End: 2025-03-27
Payer: MEDICARE

## 2025-03-27 DIAGNOSIS — Z98.89 OTHER SPECIFIED POSTPROCEDURAL STATES: Chronic | ICD-10-CM

## 2025-03-27 DIAGNOSIS — Z85.528 PERSONAL HISTORY OF OTHER MALIGNANT NEOPLASM OF KIDNEY: ICD-10-CM

## 2025-03-27 DIAGNOSIS — R35.0 FREQUENCY OF MICTURITION: ICD-10-CM

## 2025-03-27 DIAGNOSIS — R31.0 GROSS HEMATURIA: ICD-10-CM

## 2025-03-27 DIAGNOSIS — C67.8 MALIGNANT NEOPLASM OF OVERLAPPING SITES OF BLADDER: ICD-10-CM

## 2025-03-27 DIAGNOSIS — M95.9 ACQUIRED DEFORMITY OF MUSCULOSKELETAL SYSTEM, UNSPECIFIED: Chronic | ICD-10-CM

## 2025-03-27 PROCEDURE — 52000 CYSTOURETHROSCOPY: CPT

## 2025-03-27 PROCEDURE — 99214 OFFICE O/P EST MOD 30 MIN: CPT | Mod: 25

## 2025-03-28 ENCOUNTER — APPOINTMENT (OUTPATIENT)
Dept: CARDIOLOGY | Facility: CLINIC | Age: 70
End: 2025-03-28

## 2025-03-28 LAB — URINE CYTOLOGY: NORMAL

## 2025-03-31 ENCOUNTER — NON-APPOINTMENT (OUTPATIENT)
Age: 70
End: 2025-03-31

## 2025-03-31 ENCOUNTER — APPOINTMENT (OUTPATIENT)
Dept: CARDIOLOGY | Facility: CLINIC | Age: 70
End: 2025-03-31
Payer: MEDICARE

## 2025-03-31 VITALS
BODY MASS INDEX: 25.85 KG/M2 | DIASTOLIC BLOOD PRESSURE: 60 MMHG | HEIGHT: 73 IN | SYSTOLIC BLOOD PRESSURE: 148 MMHG | OXYGEN SATURATION: 98 % | HEART RATE: 84 BPM | WEIGHT: 195.06 LBS

## 2025-03-31 VITALS — DIASTOLIC BLOOD PRESSURE: 70 MMHG | SYSTOLIC BLOOD PRESSURE: 135 MMHG

## 2025-03-31 DIAGNOSIS — I10 ESSENTIAL (PRIMARY) HYPERTENSION: ICD-10-CM

## 2025-03-31 DIAGNOSIS — Z98.890 OTHER SPECIFIED POSTPROCEDURAL STATES: ICD-10-CM

## 2025-03-31 DIAGNOSIS — I25.10 ATHEROSCLEROTIC HEART DISEASE OF NATIVE CORONARY ARTERY W/OUT ANGINA PECTORIS: ICD-10-CM

## 2025-03-31 DIAGNOSIS — Z86.79 OTHER SPECIFIED POSTPROCEDURAL STATES: ICD-10-CM

## 2025-03-31 DIAGNOSIS — R60.0 LOCALIZED EDEMA: ICD-10-CM

## 2025-03-31 LAB — BACTERIA UR CULT: NORMAL

## 2025-03-31 PROCEDURE — 93000 ELECTROCARDIOGRAM COMPLETE: CPT

## 2025-03-31 PROCEDURE — 99214 OFFICE O/P EST MOD 30 MIN: CPT

## 2025-03-31 PROCEDURE — G2211 COMPLEX E/M VISIT ADD ON: CPT

## 2025-04-01 ENCOUNTER — APPOINTMENT (OUTPATIENT)
Dept: CT IMAGING | Facility: CLINIC | Age: 70
End: 2025-04-01
Payer: MEDICARE

## 2025-04-01 ENCOUNTER — OUTPATIENT (OUTPATIENT)
Dept: OUTPATIENT SERVICES | Facility: HOSPITAL | Age: 70
LOS: 1 days | End: 2025-04-01
Payer: MEDICARE

## 2025-04-01 DIAGNOSIS — C67.8 MALIGNANT NEOPLASM OF OVERLAPPING SITES OF BLADDER: ICD-10-CM

## 2025-04-01 DIAGNOSIS — Z98.89 OTHER SPECIFIED POSTPROCEDURAL STATES: Chronic | ICD-10-CM

## 2025-04-01 DIAGNOSIS — M95.9 ACQUIRED DEFORMITY OF MUSCULOSKELETAL SYSTEM, UNSPECIFIED: Chronic | ICD-10-CM

## 2025-04-01 PROCEDURE — 74178 CT ABD&PLV WO CNTR FLWD CNTR: CPT | Mod: 26

## 2025-04-01 PROCEDURE — 74178 CT ABD&PLV WO CNTR FLWD CNTR: CPT

## 2025-04-02 ENCOUNTER — OUTPATIENT (OUTPATIENT)
Dept: OUTPATIENT SERVICES | Facility: HOSPITAL | Age: 70
LOS: 1 days | End: 2025-04-02

## 2025-04-02 VITALS
TEMPERATURE: 98 F | RESPIRATION RATE: 18 BRPM | OXYGEN SATURATION: 97 % | HEIGHT: 71 IN | WEIGHT: 192.9 LBS | SYSTOLIC BLOOD PRESSURE: 129 MMHG | DIASTOLIC BLOOD PRESSURE: 72 MMHG | HEART RATE: 74 BPM

## 2025-04-02 DIAGNOSIS — Z98.89 OTHER SPECIFIED POSTPROCEDURAL STATES: Chronic | ICD-10-CM

## 2025-04-02 DIAGNOSIS — Z98.49 CATARACT EXTRACTION STATUS, UNSPECIFIED EYE: Chronic | ICD-10-CM

## 2025-04-02 DIAGNOSIS — C67.8 MALIGNANT NEOPLASM OF OVERLAPPING SITES OF BLADDER: ICD-10-CM

## 2025-04-02 DIAGNOSIS — M95.9 ACQUIRED DEFORMITY OF MUSCULOSKELETAL SYSTEM, UNSPECIFIED: Chronic | ICD-10-CM

## 2025-04-02 DIAGNOSIS — I48.91 UNSPECIFIED ATRIAL FIBRILLATION: ICD-10-CM

## 2025-04-02 DIAGNOSIS — Z98.890 OTHER SPECIFIED POSTPROCEDURAL STATES: Chronic | ICD-10-CM

## 2025-04-02 RX ORDER — SODIUM CHLORIDE 9 G/1000ML
1000 INJECTION, SOLUTION INTRAVENOUS
Refills: 0 | Status: DISCONTINUED | OUTPATIENT
Start: 2025-04-07 | End: 2025-04-21

## 2025-04-02 NOTE — H&P PST ADULT - ASSESSMENT
Pt. is a 68 yo male with malignant overlapping sites of bladder.      Pt. takes Torsemide every other day and he had a dose today.     Pt. states he's scheduled for a platelet transfusion the day prior to surgery on 4/6/25.  His platelets 4/1/25 are 42.  The results are in the chart.

## 2025-04-02 NOTE — H&P PST ADULT - NS PRO PASSIVE SMOKE EXP
Immunization History   Administered Date(s) Administered    DTaP 2000, 2000, 2000, 04/21/2001, 03/31/2005    DTaP 5 2000, 2000, 2000, 04/21/2001, 03/31/2005    HPV Quadrivalent 04/25/2013, 06/26/2013, 10/21/2013    Hep A, adult 07/31/2008, 04/01/2010    Hep B, Adolescent or Pediatric 2000, 2000, 2000    Hep B, adult 2000, 2000, 2000    Hepatitis A 07/31/2008, 04/01/2010    HiB 2000, 2000, 2000, 06/21/2001    Hib (PRP-OMP) 2000, 2000, 2000, 06/21/2001    IPV 2000, 2000, 09/20/2001, 03/31/2005    Influenza 10/15/2002, 11/18/2002, 11/11/2003, 01/18/2005, 11/09/2008, 10/30/2015, 12/02/2016, 11/06/2017    Influenza Quadrivalent Preservative Free 3 years and older IM 10/15/2002, 11/11/2003, 01/18/2005, 11/09/2008, 11/06/2017    Influenza Quadrivalent, 6-35 Months IM 10/30/2015, 12/02/2016    MMR 03/30/2001, 03/31/2004    Meningococcal B, Recombinant 05/30/2018    Meningococcal MCV4P 04/08/2011, 06/20/2017    Meningococcal, Unknown Serogroups 04/08/2011, 06/20/2017    Pneumococcal Conjugate PCV 7 03/30/2001    Pneumococcal Polysaccharide PPV23 2000, 2000, 2000    Tdap 04/08/2011    Tuberculin Skin Test-PPD Intradermal 03/21/2016    Varicella 03/30/2001, 07/31/2008
Yes...

## 2025-04-02 NOTE — H&P PST ADULT - NSICDXFAMILYHX_GEN_ALL_CORE_FT
FAMILY HISTORY:  Family history of CHF (congestive heart failure), parents  Family history of diabetes mellitus (DM), brother  Family history of kidney disease, mother and brother    Sibling  Still living? No  Family history of diabetes mellitus, Age at diagnosis: Age Unknown  FH: lung cancer, Age at diagnosis: Age Unknown

## 2025-04-02 NOTE — H&P PST ADULT - NSICDXPASTMEDICALHX_GEN_ALL_CORE_FT
PAST MEDICAL HISTORY:  Anemia     Atrial fibrillation on Xarelto    Basal cell carcinoma nose    BPH (benign prostatic hypertrophy) denies    Bulging cervical disc    Cataract of right eye     Degenerated intervertebral disc lumbar    H/O polycythemia vera     HTN (hypertension)     Hyperlipidemia     Left cataract     Melanoma in situ of skin of trunk     Muscle atrophy bilateral feet    Myelofibrosis     Peripheral neuropathy "both feet"    Psoriasis (a type of skin inflammation)     Renal cell carcinoma      PAST MEDICAL HISTORY:  Anemia     Atrial fibrillation on Xarelto    Basal cell carcinoma nose    BPH (benign prostatic hypertrophy) denies    Bulging cervical disc    Cataract of right eye     Chronic cough     Degenerated intervertebral disc lumbar    H/O polycythemia vera     Hiatal hernia     HTN (hypertension)     Hyperlipidemia     Left cataract     Malignant neoplasm of overlapping sites of bladder     Melanoma in situ of skin of trunk     Mild pulmonary hypertension     Muscle atrophy bilateral feet    Myelofibrosis     OAB (overactive bladder)     Peripheral neuropathy "both feet"    Psoriasis (a type of skin inflammation)     Renal cell carcinoma     Sleep apnea     Thrombocytopenia

## 2025-04-02 NOTE — H&P PST ADULT - BLOOD TRANSFUSION, PREVIOUS, PROFILE
has had multiple transfusion "I get them every 2-3 weeks now", last received last week/yes most recent was last week, has had multiple transfusion "I get them every 2-3 weeks now", last received last week/yes

## 2025-04-02 NOTE — H&P PST ADULT - NSICDXPASTSURGICALHX_GEN_ALL_CORE_FT
PAST SURGICAL HISTORY:  H/O cataract extraction     H/O partial nephrectomy right-12/17/12    Left varicocele     Melanoma in situ of back x1-1983, [id number]    Mohs defect mohs procedure bilateral sides of nose with skin graft from clavicular areas 7/2016    S/P cystoscopy TURBT-05/2012    S/P cystoscopy 5/15/14    Status post ablation of atrial fibrillation

## 2025-04-02 NOTE — H&P PST ADULT - PROBLEM SELECTOR PLAN 1
Pt. is scheduled for a cystoscopy, transurethral resection bladder tumor 4/7/25.  Pt. verbalized understanding of instructions.  Urine culture results in chart from 3/27/25.  Pt. had a blood transfusion last week.  His CBC results are in the chart with a CMP from 4/1/25.  Pt. instructed to take Prednisone, Omeprazole, and Metoprolol Succinate morning of surgery.

## 2025-04-02 NOTE — H&P PST ADULT - CARDIOVASCULAR COMMENTS
had an ablation for a. fib. in 2023, mild pulmonary htn seen on ECHO results had an ablation for a. fib. in 12/2023, mild pulmonary htn seen on ECHO results

## 2025-04-02 NOTE — H&P PST ADULT - HISTORY OF PRESENT ILLNESS
Pt. has a H/O bladder cancer in 2019.  Pt. had a TURBT.  Two weeks ago pt. had gross hematuria.  The hematuria "stopped on 3/20."  He was seen by the Surgeon 3/27/25 and had an in office cystoscopy which revealed a bladder abnormality.   Pt. has a PMHX of atrial fibrillation, HTN, HLD, hiatal hernia, myelofibrosis, sleep apnea, BCC, melanoma, anemia, renal cell carcinoma, and thrombocytopenia.  Pt. is at PST for a malignant neoplasm overlapping sites of bladder to be evaluated for a cystoscopy, transurethral resection bladder tumor.    Pt. has a H/O bladder cancer in 2019.  Pt. had a TURBT.  Two weeks ago pt. had gross hematuria.  The hematuria "stopped on 3/20."  He was seen by the Surgeon 3/27/25 and had an in office cystoscopy which revealed a bladder abnormality.

## 2025-04-04 NOTE — ASU PATIENT PROFILE, ADULT - ARRIVAL TIME
González Estrada  at [facility] notified that patient is placed on medical hold pending further documentation.
09:30

## 2025-04-04 NOTE — ASU PATIENT PROFILE, ADULT - FALL HARM RISK - UNIVERSAL INTERVENTIONS
Bed in lowest position, wheels locked, appropriate side rails in place/Call bell, personal items and telephone in reach/Instruct patient to call for assistance before getting out of bed or chair/Non-slip footwear when patient is out of bed/[place] to call system/Physically safe environment - no spills, clutter or unnecessary equipment/Purposeful Proactive Rounding/Room/bathroom lighting operational, light cord in reach

## 2025-04-07 ENCOUNTER — RESULT REVIEW (OUTPATIENT)
Age: 70
End: 2025-04-07

## 2025-04-07 ENCOUNTER — OUTPATIENT (OUTPATIENT)
Dept: OUTPATIENT SERVICES | Facility: HOSPITAL | Age: 70
LOS: 1 days | End: 2025-04-07
Payer: MEDICARE

## 2025-04-07 ENCOUNTER — TRANSCRIPTION ENCOUNTER (OUTPATIENT)
Age: 70
End: 2025-04-07

## 2025-04-07 ENCOUNTER — APPOINTMENT (OUTPATIENT)
Dept: UROLOGY | Facility: HOSPITAL | Age: 70
End: 2025-04-07

## 2025-04-07 VITALS
OXYGEN SATURATION: 99 % | DIASTOLIC BLOOD PRESSURE: 65 MMHG | TEMPERATURE: 98 F | RESPIRATION RATE: 18 BRPM | SYSTOLIC BLOOD PRESSURE: 130 MMHG | HEART RATE: 66 BPM

## 2025-04-07 VITALS
SYSTOLIC BLOOD PRESSURE: 134 MMHG | TEMPERATURE: 97 F | DIASTOLIC BLOOD PRESSURE: 86 MMHG | RESPIRATION RATE: 17 BRPM | OXYGEN SATURATION: 100 % | HEIGHT: 71 IN | WEIGHT: 199.96 LBS

## 2025-04-07 DIAGNOSIS — Z98.89 OTHER SPECIFIED POSTPROCEDURAL STATES: Chronic | ICD-10-CM

## 2025-04-07 DIAGNOSIS — Z98.890 OTHER SPECIFIED POSTPROCEDURAL STATES: Chronic | ICD-10-CM

## 2025-04-07 DIAGNOSIS — Z98.49 CATARACT EXTRACTION STATUS, UNSPECIFIED EYE: Chronic | ICD-10-CM

## 2025-04-07 DIAGNOSIS — C67.8 MALIGNANT NEOPLASM OF OVERLAPPING SITES OF BLADDER: ICD-10-CM

## 2025-04-07 DIAGNOSIS — R31.0 GROSS HEMATURIA: ICD-10-CM

## 2025-04-07 DIAGNOSIS — Z85.528 PERSONAL HISTORY OF OTHER MALIGNANT NEOPLASM OF KIDNEY: ICD-10-CM

## 2025-04-07 DIAGNOSIS — M95.9 ACQUIRED DEFORMITY OF MUSCULOSKELETAL SYSTEM, UNSPECIFIED: Chronic | ICD-10-CM

## 2025-04-07 LAB
ALBUMIN SERPL ELPH-MCNC: 3.3 G/DL — SIGNIFICANT CHANGE UP (ref 3.3–5)
ALP SERPL-CCNC: 59 U/L — SIGNIFICANT CHANGE UP (ref 40–120)
ALT FLD-CCNC: 27 U/L — SIGNIFICANT CHANGE UP (ref 4–41)
ANION GAP SERPL CALC-SCNC: 11 MMOL/L — SIGNIFICANT CHANGE UP (ref 7–14)
ANISOCYTOSIS BLD QL: SIGNIFICANT CHANGE UP
ANISOCYTOSIS BLD QL: SIGNIFICANT CHANGE UP
AST SERPL-CCNC: 38 U/L — SIGNIFICANT CHANGE UP (ref 4–40)
BASOPHILS # BLD AUTO: 0 K/UL — SIGNIFICANT CHANGE UP (ref 0–0.2)
BASOPHILS NFR BLD AUTO: 0 % — SIGNIFICANT CHANGE UP (ref 0–2)
BILIRUB SERPL-MCNC: 0.6 MG/DL — SIGNIFICANT CHANGE UP (ref 0.2–1.2)
BLD GP AB SCN SERPL QL: NEGATIVE — SIGNIFICANT CHANGE UP
BUN SERPL-MCNC: 23 MG/DL — SIGNIFICANT CHANGE UP (ref 7–23)
CALCIUM SERPL-MCNC: 8.6 MG/DL — SIGNIFICANT CHANGE UP (ref 8.4–10.5)
CHLORIDE SERPL-SCNC: 103 MMOL/L — SIGNIFICANT CHANGE UP (ref 98–107)
CO2 SERPL-SCNC: 24 MMOL/L — SIGNIFICANT CHANGE UP (ref 22–31)
CREAT SERPL-MCNC: 0.82 MG/DL — SIGNIFICANT CHANGE UP (ref 0.5–1.3)
DACRYOCYTES BLD QL SMEAR: SLIGHT — SIGNIFICANT CHANGE UP
DACRYOCYTES BLD QL SMEAR: SLIGHT — SIGNIFICANT CHANGE UP
EGFR: 96 ML/MIN/1.73M2 — SIGNIFICANT CHANGE UP
EGFR: 96 ML/MIN/1.73M2 — SIGNIFICANT CHANGE UP
EOSINOPHIL # BLD AUTO: 0 K/UL — SIGNIFICANT CHANGE UP (ref 0–0.5)
EOSINOPHIL NFR BLD AUTO: 0 % — SIGNIFICANT CHANGE UP (ref 0–6)
GIANT PLATELETS BLD QL SMEAR: PRESENT — SIGNIFICANT CHANGE UP
GLUCOSE SERPL-MCNC: 120 MG/DL — HIGH (ref 70–99)
HCT VFR BLD CALC: 22.3 % — LOW (ref 39–50)
HCT VFR BLD CALC: 23.8 % — LOW (ref 39–50)
HCT VFR BLD CALC: 27.5 % — LOW (ref 39–50)
HCT VFR BLD CALC: 27.7 % — LOW (ref 39–50)
HGB BLD-MCNC: 7.1 G/DL — LOW (ref 13–17)
HGB BLD-MCNC: 7.7 G/DL — LOW (ref 13–17)
HGB BLD-MCNC: 8.7 G/DL — LOW (ref 13–17)
HGB BLD-MCNC: 8.9 G/DL — LOW (ref 13–17)
IANC: 1.92 K/UL — SIGNIFICANT CHANGE UP (ref 1.8–7.4)
LYMPHOCYTES # BLD AUTO: 0.19 K/UL — LOW (ref 1–3.3)
LYMPHOCYTES # BLD AUTO: 7 % — LOW (ref 13–44)
MACROCYTES BLD QL: SIGNIFICANT CHANGE UP
MACROCYTES BLD QL: SIGNIFICANT CHANGE UP
MAGNESIUM SERPL-MCNC: 2.1 MG/DL — SIGNIFICANT CHANGE UP (ref 1.6–2.6)
MANUAL SMEAR VERIFICATION: SIGNIFICANT CHANGE UP
MCHC RBC-ENTMCNC: 30.3 PG — SIGNIFICANT CHANGE UP (ref 27–34)
MCHC RBC-ENTMCNC: 30.4 PG — SIGNIFICANT CHANGE UP (ref 27–34)
MCHC RBC-ENTMCNC: 30.8 PG — SIGNIFICANT CHANGE UP (ref 27–34)
MCHC RBC-ENTMCNC: 30.8 PG — SIGNIFICANT CHANGE UP (ref 27–34)
MCHC RBC-ENTMCNC: 31.6 G/DL — LOW (ref 32–36)
MCHC RBC-ENTMCNC: 31.8 G/DL — LOW (ref 32–36)
MCHC RBC-ENTMCNC: 32.1 G/DL — SIGNIFICANT CHANGE UP (ref 32–36)
MCHC RBC-ENTMCNC: 32.4 G/DL — SIGNIFICANT CHANGE UP (ref 32–36)
MCV RBC AUTO: 95.2 FL — SIGNIFICANT CHANGE UP (ref 80–100)
MCV RBC AUTO: 95.3 FL — SIGNIFICANT CHANGE UP (ref 80–100)
MCV RBC AUTO: 95.8 FL — SIGNIFICANT CHANGE UP (ref 80–100)
MCV RBC AUTO: 96.2 FL — SIGNIFICANT CHANGE UP (ref 80–100)
MONOCYTES # BLD AUTO: 0.11 K/UL — SIGNIFICANT CHANGE UP (ref 0–0.9)
MONOCYTES NFR BLD AUTO: 4.3 % — SIGNIFICANT CHANGE UP (ref 2–14)
MYELOCYTES NFR BLD: 0.9 % — HIGH (ref 0–0)
NEUTROPHILS # BLD AUTO: 2.18 K/UL — SIGNIFICANT CHANGE UP (ref 1.8–7.4)
NEUTROPHILS NFR BLD AUTO: 76.5 % — SIGNIFICANT CHANGE UP (ref 43–77)
NEUTS BAND # BLD: 5.2 % — SIGNIFICANT CHANGE UP (ref 0–6)
NEUTS BAND NFR BLD: 5.2 % — SIGNIFICANT CHANGE UP (ref 0–6)
NRBC # BLD AUTO: 0.02 K/UL — HIGH (ref 0–0)
NRBC # BLD AUTO: 0.03 K/UL — HIGH (ref 0–0)
NRBC # BLD AUTO: 0.04 K/UL — HIGH (ref 0–0)
NRBC # BLD: 1 /100 WBCS — HIGH (ref 0–0)
NRBC # FLD: 0.02 K/UL — HIGH (ref 0–0)
NRBC # FLD: 0.03 K/UL — HIGH (ref 0–0)
NRBC # FLD: 0.04 K/UL — HIGH (ref 0–0)
NRBC BLD AUTO-RTO: 0 /100 WBCS — SIGNIFICANT CHANGE UP (ref 0–0)
NRBC BLD AUTO-RTO: 0 /100 WBCS — SIGNIFICANT CHANGE UP (ref 0–0)
NRBC BLD AUTO-RTO: 1 /100 WBCS — HIGH (ref 0–0)
NRBC BLD-RTO: 1 /100 WBCS — HIGH (ref 0–0)
OVALOCYTES BLD QL SMEAR: SLIGHT — SIGNIFICANT CHANGE UP
PHOSPHATE SERPL-MCNC: 4 MG/DL — SIGNIFICANT CHANGE UP (ref 2.5–4.5)
PLAT MORPH BLD: NORMAL — SIGNIFICANT CHANGE UP
PLAT MORPH BLD: NORMAL — SIGNIFICANT CHANGE UP
PLATELET # BLD AUTO: 37 K/UL — LOW (ref 150–400)
PLATELET # BLD AUTO: 38 K/UL — LOW (ref 150–400)
PLATELET # BLD AUTO: 42 K/UL — LOW (ref 150–400)
PLATELET # BLD AUTO: 42 K/UL — LOW (ref 150–400)
PLATELET COUNT - ESTIMATE: ABNORMAL
PLATELET COUNT - ESTIMATE: ABNORMAL
POIKILOCYTOSIS BLD QL AUTO: SLIGHT — SIGNIFICANT CHANGE UP
POIKILOCYTOSIS BLD QL AUTO: SLIGHT — SIGNIFICANT CHANGE UP
POLYCHROMASIA BLD QL SMEAR: SLIGHT — SIGNIFICANT CHANGE UP
POLYCHROMASIA BLD QL SMEAR: SLIGHT — SIGNIFICANT CHANGE UP
POTASSIUM SERPL-MCNC: 4.2 MMOL/L — SIGNIFICANT CHANGE UP (ref 3.5–5.3)
POTASSIUM SERPL-SCNC: 4.2 MMOL/L — SIGNIFICANT CHANGE UP (ref 3.5–5.3)
PROT SERPL-MCNC: 6 G/DL — SIGNIFICANT CHANGE UP (ref 6–8.3)
RBC # BLD: 2.34 M/UL — LOW (ref 4.2–5.8)
RBC # BLD: 2.5 M/UL — LOW (ref 4.2–5.8)
RBC # BLD: 2.86 M/UL — LOW (ref 4.2–5.8)
RBC # BLD: 2.89 M/UL — LOW (ref 4.2–5.8)
RBC # FLD: 20.8 % — HIGH (ref 10.3–14.5)
RBC # FLD: 20.9 % — HIGH (ref 10.3–14.5)
RBC # FLD: 21.1 % — HIGH (ref 10.3–14.5)
RBC # FLD: 21.4 % — HIGH (ref 10.3–14.5)
RBC BLD AUTO: ABNORMAL
RBC BLD AUTO: ABNORMAL
RH IG SCN BLD-IMP: POSITIVE — SIGNIFICANT CHANGE UP
SODIUM SERPL-SCNC: 138 MMOL/L — SIGNIFICANT CHANGE UP (ref 135–145)
VARIANT LYMPHS # BLD: 6.1 % — HIGH (ref 0–6)
VARIANT LYMPHS NFR BLD MANUAL: 6.1 % — HIGH (ref 0–6)
WBC # BLD: 2.34 K/UL — LOW (ref 3.8–10.5)
WBC # BLD: 2.67 K/UL — LOW (ref 3.8–10.5)
WBC # BLD: 3.56 K/UL — LOW (ref 3.8–10.5)
WBC # BLD: 3.82 K/UL — SIGNIFICANT CHANGE UP (ref 3.8–10.5)
WBC # FLD AUTO: 2.34 K/UL — LOW (ref 3.8–10.5)
WBC # FLD AUTO: 2.67 K/UL — LOW (ref 3.8–10.5)
WBC # FLD AUTO: 3.56 K/UL — LOW (ref 3.8–10.5)
WBC # FLD AUTO: 3.82 K/UL — SIGNIFICANT CHANGE UP (ref 3.8–10.5)

## 2025-04-07 PROCEDURE — 52234 CYSTOSCOPY AND TREATMENT: CPT

## 2025-04-07 PROCEDURE — 88307 TISSUE EXAM BY PATHOLOGIST: CPT | Mod: 26

## 2025-04-07 RX ORDER — ONDANSETRON HCL/PF 4 MG/2 ML
1 VIAL (ML) INJECTION
Refills: 0 | DISCHARGE

## 2025-04-07 RX ORDER — DIAZEPAM 2 MG/1
5 TABLET ORAL ONCE
Refills: 0 | Status: DISCONTINUED | OUTPATIENT
Start: 2025-04-07 | End: 2025-04-07

## 2025-04-07 RX ORDER — ALBUTEROL SULFATE 2.5 MG/3ML
2 VIAL, NEBULIZER (ML) INHALATION
Refills: 0 | DISCHARGE

## 2025-04-07 RX ORDER — TAMSULOSIN HYDROCHLORIDE 0.4 MG/1
1 CAPSULE ORAL
Refills: 0 | DISCHARGE

## 2025-04-07 RX ORDER — ONDANSETRON HCL/PF 4 MG/2 ML
4 VIAL (ML) INJECTION ONCE
Refills: 0 | Status: DISCONTINUED | OUTPATIENT
Start: 2025-04-07 | End: 2025-04-21

## 2025-04-07 RX ORDER — HYDROMORPHONE/SOD CHLOR,ISO/PF 2 MG/10 ML
0.5 SYRINGE (ML) INJECTION
Refills: 0 | Status: DISCONTINUED | OUTPATIENT
Start: 2025-04-07 | End: 2025-04-07

## 2025-04-07 RX ORDER — FINASTERIDE 1 MG/1
1 TABLET, FILM COATED ORAL
Refills: 0 | DISCHARGE

## 2025-04-07 RX ORDER — OMEPRAZOLE 20 MG/1
1 CAPSULE, DELAYED RELEASE ORAL
Refills: 0 | DISCHARGE

## 2025-04-07 RX ORDER — TORSEMIDE 10 MG
1 TABLET ORAL
Refills: 0 | DISCHARGE

## 2025-04-07 RX ORDER — OXYCODONE HYDROCHLORIDE 30 MG/1
5 TABLET ORAL ONCE
Refills: 0 | Status: DISCONTINUED | OUTPATIENT
Start: 2025-04-07 | End: 2025-04-07

## 2025-04-07 RX ADMIN — SODIUM CHLORIDE 30 MILLILITER(S): 9 INJECTION, SOLUTION INTRAVENOUS at 15:12

## 2025-04-07 RX ADMIN — DIAZEPAM 5 MILLIGRAM(S): 2 TABLET ORAL at 14:30

## 2025-04-07 NOTE — ASU DISCHARGE PLAN (ADULT/PEDIATRIC) - NURSING INSTRUCTIONS
You were given 1000mg IV Tylenol for pain management.  Please DO NOT take any Tylenol containing products, such as  Vicodin, Percocet, Excedrin, many cold preparations for the next 6 hours (until  8PM).  DO NOT EXCEED 4000MG OF TYLENOL OVER 24 HOURS.

## 2025-04-07 NOTE — ASU PREOP CHECKLIST - 2.
psoriasis to hand/ skin clean dry and intact psoriasis to hand, forehead, baseline redness left lower leg / skin clean dry and intact

## 2025-04-07 NOTE — ASU PREOP CHECKLIST - 3.
Problem: Adult Inpatient Plan of Care  Goal: Plan of Care Review  Outcome: Ongoing, Progressing  Goal: Patient-Specific Goal (Individualized)  Outcome: Ongoing, Progressing  Goal: Absence of Hospital-Acquired Illness or Injury  Outcome: Ongoing, Progressing  Goal: Optimal Comfort and Wellbeing  Outcome: Ongoing, Progressing  Goal: Readiness for Transition of Care  Outcome: Ongoing, Progressing     Problem: Infection  Goal: Absence of Infection Signs and Symptoms  Outcome: Ongoing, Progressing      Platelet 42 reported patient with history s/p unit of Platelet 4/6/25 tolerated well. unit of Platelet  ordered preop patient seen preop bedside attending and anesthesia aware. tolerated post transfusion well. vitals stable no complaint prior to OR. 30min post transfusion blood product sheet endorsed to OR RN.

## 2025-04-07 NOTE — ASU PREOP CHECKLIST - RESPIRATORY RATE (BREATHS/MIN)
17 Quality 402: Tobacco Use And Help With Quitting Among Adolescents: Patient screened for tobacco and never smoked Quality 110: Preventive Care And Screening: Influenza Immunization: Influenza Immunization Administered during Influenza season Detail Level: Detailed

## 2025-04-07 NOTE — ASU DISCHARGE PLAN (ADULT/PEDIATRIC) - ASU DC SPECIAL INSTRUCTIONSFT
GENERAL: It is common to have blood in your urine after your procedure. It may be pink or even red; inform your doctor if you have a significant amount of clot in the urine or if you are unable to void at all or if your catheter stops draining. The urine may clear and then become bloody again especially as you are more physically active. It is not uncommon to have some burning when you urinate, this will gradually improve.  BATHING: You may shower or bathe.   DIET: You may resume your regular diet and regular medication regimen.  PAIN: You may take Tylenol (acetaminophen) 650-975mg and/or Motrin (ibuprofen) 400-600mg, both available over the counter, for pain every 6 hours as needed. Do not exceed 4000mg of Tylenol (acetaminophen) daily. You may alternate these medications such that you take one or the other every 3 hours for around the clock pain coverage.  STOOL SOFTENERS: Do not allow yourself to become constipated as straining may cause bleeding. Take stool softeners or a laxative (ex. Miralax, Colace, Senokot, ExLax, etc), available over the counter, if needed.  ACTIVITY: No heavy lifting or strenuous exercise until you are evaluated at your post-operative appointment. Otherwise, you may return to your usual level of physical activity.  FOLLOW-UP: Dr. Love will call you with results of the bladder biopsy in 7-10 days to discuss next steps.   CALL YOUR UROLOGIST IF: You have any bleeding that does not stop, inability to void >8 hours, fever over 100.4 F, chills, persistent nausea/vomiting, changes in your incision concerning for infection, or if your pain is not controlled on your discharge pain medications.

## 2025-04-07 NOTE — ASU DISCHARGE PLAN (ADULT/PEDIATRIC) - FINANCIAL ASSISTANCE
[facility] provides services at a reduced cost to those who are determined to be eligible through [facility]’s financial assistance program. Information regarding [facility]’s financial assistance program can be found by going to [email or web address] or by calling [phone].

## 2025-04-07 NOTE — BRIEF OPERATIVE NOTE - NSICDXBRIEFPROCEDURE_GEN_ALL_CORE_FT
PROCEDURES:  Transurethral resection of bladder tumor (TURBT) with biopsy 07-Apr-2025 14:01:50  Soledad Hernández

## 2025-04-08 ENCOUNTER — APPOINTMENT (OUTPATIENT)
Dept: UROLOGY | Facility: CLINIC | Age: 70
End: 2025-04-08
Payer: MEDICARE

## 2025-04-08 ENCOUNTER — INPATIENT (INPATIENT)
Facility: HOSPITAL | Age: 70
LOS: 2 days | Discharge: HOME HEALTH | End: 2025-04-11
Attending: UROLOGY | Admitting: UROLOGY
Payer: MEDICARE

## 2025-04-08 ENCOUNTER — NON-APPOINTMENT (OUTPATIENT)
Age: 70
End: 2025-04-08

## 2025-04-08 ENCOUNTER — OUTPATIENT (OUTPATIENT)
Dept: OUTPATIENT SERVICES | Facility: HOSPITAL | Age: 70
LOS: 1 days | End: 2025-04-08
Payer: MEDICARE

## 2025-04-08 VITALS
SYSTOLIC BLOOD PRESSURE: 123 MMHG | RESPIRATION RATE: 17 BRPM | WEIGHT: 199.96 LBS | TEMPERATURE: 97 F | OXYGEN SATURATION: 100 % | HEART RATE: 67 BPM | DIASTOLIC BLOOD PRESSURE: 74 MMHG | HEIGHT: 71 IN

## 2025-04-08 VITALS — DIASTOLIC BLOOD PRESSURE: 66 MMHG | HEART RATE: 100 BPM | SYSTOLIC BLOOD PRESSURE: 155 MMHG | OXYGEN SATURATION: 100 %

## 2025-04-08 VITALS — OXYGEN SATURATION: 100 % | SYSTOLIC BLOOD PRESSURE: 145 MMHG | HEART RATE: 75 BPM | DIASTOLIC BLOOD PRESSURE: 69 MMHG

## 2025-04-08 DIAGNOSIS — Z98.89 OTHER SPECIFIED POSTPROCEDURAL STATES: Chronic | ICD-10-CM

## 2025-04-08 DIAGNOSIS — Z98.49 CATARACT EXTRACTION STATUS, UNSPECIFIED EYE: Chronic | ICD-10-CM

## 2025-04-08 DIAGNOSIS — M95.9 ACQUIRED DEFORMITY OF MUSCULOSKELETAL SYSTEM, UNSPECIFIED: Chronic | ICD-10-CM

## 2025-04-08 DIAGNOSIS — Z98.890 OTHER SPECIFIED POSTPROCEDURAL STATES: Chronic | ICD-10-CM

## 2025-04-08 DIAGNOSIS — R33.9 RETENTION OF URINE, UNSPECIFIED: ICD-10-CM

## 2025-04-08 PROBLEM — N32.81 OVERACTIVE BLADDER: Chronic | Status: ACTIVE | Noted: 2025-04-02

## 2025-04-08 PROBLEM — D64.9 ANEMIA, UNSPECIFIED: Chronic | Status: ACTIVE | Noted: 2025-04-02

## 2025-04-08 PROBLEM — G47.30 SLEEP APNEA, UNSPECIFIED: Chronic | Status: ACTIVE | Noted: 2025-04-02

## 2025-04-08 LAB
ALBUMIN SERPL ELPH-MCNC: 3.5 G/DL — SIGNIFICANT CHANGE UP (ref 3.3–5)
ALP SERPL-CCNC: 59 U/L — SIGNIFICANT CHANGE UP (ref 40–120)
ALT FLD-CCNC: 28 U/L — SIGNIFICANT CHANGE UP (ref 4–41)
ANION GAP SERPL CALC-SCNC: 14 MMOL/L — SIGNIFICANT CHANGE UP (ref 7–14)
APPEARANCE UR: ABNORMAL
APTT BLD: 24.9 SEC — SIGNIFICANT CHANGE UP (ref 24.5–35.6)
AST SERPL-CCNC: 54 U/L — HIGH (ref 4–40)
BASOPHILS # BLD AUTO: 0.02 K/UL — SIGNIFICANT CHANGE UP (ref 0–0.2)
BASOPHILS NFR BLD AUTO: 0.4 % — SIGNIFICANT CHANGE UP (ref 0–2)
BILIRUB SERPL-MCNC: 0.8 MG/DL — SIGNIFICANT CHANGE UP (ref 0.2–1.2)
BILIRUB UR-MCNC: NEGATIVE — SIGNIFICANT CHANGE UP
BLD GP AB SCN SERPL QL: NEGATIVE — SIGNIFICANT CHANGE UP
BUN SERPL-MCNC: 28 MG/DL — HIGH (ref 7–23)
CALCIUM SERPL-MCNC: 8.9 MG/DL — SIGNIFICANT CHANGE UP (ref 8.4–10.5)
CHLORIDE SERPL-SCNC: 101 MMOL/L — SIGNIFICANT CHANGE UP (ref 98–107)
CO2 SERPL-SCNC: 19 MMOL/L — LOW (ref 22–31)
COLOR SPEC: ABNORMAL
CREAT SERPL-MCNC: 0.95 MG/DL — SIGNIFICANT CHANGE UP (ref 0.5–1.3)
DIFF PNL FLD: ABNORMAL
EGFR: 87 ML/MIN/1.73M2 — SIGNIFICANT CHANGE UP
EGFR: 87 ML/MIN/1.73M2 — SIGNIFICANT CHANGE UP
EOSINOPHIL # BLD AUTO: 0 K/UL — SIGNIFICANT CHANGE UP (ref 0–0.5)
EOSINOPHIL NFR BLD AUTO: 0 % — SIGNIFICANT CHANGE UP (ref 0–6)
GLUCOSE SERPL-MCNC: 172 MG/DL — HIGH (ref 70–99)
GLUCOSE UR QL: NEGATIVE MG/DL — SIGNIFICANT CHANGE UP
HCT VFR BLD CALC: 23.5 % — LOW (ref 39–50)
HGB BLD-MCNC: 7.6 G/DL — LOW (ref 13–17)
IANC: 3.53 K/UL — SIGNIFICANT CHANGE UP (ref 1.8–7.4)
IMM GRANULOCYTES NFR BLD AUTO: 7.8 % — HIGH (ref 0–0.9)
INR BLD: 1.13 RATIO — SIGNIFICANT CHANGE UP (ref 0.85–1.16)
KETONES UR-MCNC: NEGATIVE MG/DL — SIGNIFICANT CHANGE UP
LEUKOCYTE ESTERASE UR-ACNC: ABNORMAL
LYMPHOCYTES # BLD AUTO: 0.72 K/UL — LOW (ref 1–3.3)
LYMPHOCYTES # BLD AUTO: 14.8 % — SIGNIFICANT CHANGE UP (ref 13–44)
MCHC RBC-ENTMCNC: 30.4 PG — SIGNIFICANT CHANGE UP (ref 27–34)
MCHC RBC-ENTMCNC: 32.3 G/DL — SIGNIFICANT CHANGE UP (ref 32–36)
MCV RBC AUTO: 94 FL — SIGNIFICANT CHANGE UP (ref 80–100)
MONOCYTES # BLD AUTO: 0.21 K/UL — SIGNIFICANT CHANGE UP (ref 0–0.9)
MONOCYTES NFR BLD AUTO: 4.3 % — SIGNIFICANT CHANGE UP (ref 2–14)
NEUTROPHILS # BLD AUTO: 3.53 K/UL — SIGNIFICANT CHANGE UP (ref 1.8–7.4)
NEUTROPHILS NFR BLD AUTO: 72.7 % — SIGNIFICANT CHANGE UP (ref 43–77)
NITRITE UR-MCNC: NEGATIVE — SIGNIFICANT CHANGE UP
NRBC # BLD AUTO: 0.07 K/UL — HIGH (ref 0–0)
NRBC # FLD: 0.07 K/UL — HIGH (ref 0–0)
NRBC BLD AUTO-RTO: 1 /100 WBCS — HIGH (ref 0–0)
PH UR: 6 — SIGNIFICANT CHANGE UP (ref 5–8)
PLATELET # BLD AUTO: 45 K/UL — LOW (ref 150–400)
POTASSIUM SERPL-MCNC: 4.1 MMOL/L — SIGNIFICANT CHANGE UP (ref 3.5–5.3)
POTASSIUM SERPL-SCNC: 4.1 MMOL/L — SIGNIFICANT CHANGE UP (ref 3.5–5.3)
PROT SERPL-MCNC: 6.4 G/DL — SIGNIFICANT CHANGE UP (ref 6–8.3)
PROT UR-MCNC: 100 MG/DL
PROTHROM AB SERPL-ACNC: 13.4 SEC — SIGNIFICANT CHANGE UP (ref 9.9–13.4)
RBC # BLD: 2.5 M/UL — LOW (ref 4.2–5.8)
RBC # FLD: 20.9 % — HIGH (ref 10.3–14.5)
RH IG SCN BLD-IMP: POSITIVE — SIGNIFICANT CHANGE UP
SODIUM SERPL-SCNC: 134 MMOL/L — LOW (ref 135–145)
SP GR SPEC: 1.01 — SIGNIFICANT CHANGE UP (ref 1–1.03)
UROBILINOGEN FLD QL: 0.2 MG/DL — SIGNIFICANT CHANGE UP (ref 0.2–1)
WBC # BLD: 4.86 K/UL — SIGNIFICANT CHANGE UP (ref 3.8–10.5)
WBC # FLD AUTO: 4.86 K/UL — SIGNIFICANT CHANGE UP (ref 3.8–10.5)

## 2025-04-08 PROCEDURE — 99213 OFFICE O/P EST LOW 20 MIN: CPT | Mod: 25,PD

## 2025-04-08 PROCEDURE — 51700 IRRIGATION OF BLADDER: CPT

## 2025-04-08 PROCEDURE — 51700 IRRIGATION OF BLADDER: CPT | Mod: PD

## 2025-04-08 PROCEDURE — 76770 US EXAM ABDO BACK WALL COMP: CPT | Mod: 26

## 2025-04-08 PROCEDURE — 99285 EMERGENCY DEPT VISIT HI MDM: CPT | Mod: GC

## 2025-04-08 RX ORDER — FINASTERIDE 1 MG/1
5 TABLET, FILM COATED ORAL DAILY
Refills: 0 | Status: DISCONTINUED | OUTPATIENT
Start: 2025-04-08 | End: 2025-04-11

## 2025-04-08 RX ORDER — METOPROLOL SUCCINATE 50 MG/1
12.5 TABLET, EXTENDED RELEASE ORAL DAILY
Refills: 0 | Status: DISCONTINUED | OUTPATIENT
Start: 2025-04-08 | End: 2025-04-11

## 2025-04-08 RX ORDER — PIPERACILLIN-TAZO-DEXTROSE,ISO 3.375G/5
3.38 IV SOLUTION, PIGGYBACK PREMIX FROZEN(ML) INTRAVENOUS ONCE
Refills: 0 | Status: COMPLETED | OUTPATIENT
Start: 2025-04-08 | End: 2025-04-08

## 2025-04-08 RX ORDER — PIPERACILLIN-TAZO-DEXTROSE,ISO 3.375G/5
3.38 IV SOLUTION, PIGGYBACK PREMIX FROZEN(ML) INTRAVENOUS ONCE
Refills: 0 | Status: DISCONTINUED | OUTPATIENT
Start: 2025-04-08 | End: 2025-04-11

## 2025-04-08 RX ORDER — LIDOCAINE HYDROCHLORIDE 20 MG/ML
1 JELLY TOPICAL
Refills: 0 | Status: DISCONTINUED | OUTPATIENT
Start: 2025-04-08 | End: 2025-04-11

## 2025-04-08 RX ORDER — SODIUM CHLORIDE 9 G/1000ML
1000 INJECTION, SOLUTION INTRAVENOUS
Refills: 0 | Status: DISCONTINUED | OUTPATIENT
Start: 2025-04-08 | End: 2025-04-11

## 2025-04-08 RX ORDER — TAMSULOSIN HYDROCHLORIDE 0.4 MG/1
0.4 CAPSULE ORAL AT BEDTIME
Refills: 0 | Status: DISCONTINUED | OUTPATIENT
Start: 2025-04-08 | End: 2025-04-11

## 2025-04-08 RX ORDER — PREDNISONE 20 MG/1
10 TABLET ORAL DAILY
Refills: 0 | Status: DISCONTINUED | OUTPATIENT
Start: 2025-04-08 | End: 2025-04-11

## 2025-04-08 RX ORDER — ROSUVASTATIN CALCIUM 5 MG/1
5 TABLET, FILM COATED ORAL AT BEDTIME
Refills: 0 | Status: DISCONTINUED | OUTPATIENT
Start: 2025-04-08 | End: 2025-04-11

## 2025-04-08 RX ORDER — ALBUTEROL SULFATE 2.5 MG/3ML
2 VIAL, NEBULIZER (ML) INHALATION EVERY 6 HOURS
Refills: 0 | Status: DISCONTINUED | OUTPATIENT
Start: 2025-04-08 | End: 2025-04-11

## 2025-04-08 RX ORDER — LIDOCAINE HCL/PF 10 MG/ML
10 VIAL (ML) INJECTION ONCE
Refills: 0 | Status: DISCONTINUED | OUTPATIENT
Start: 2025-04-08 | End: 2025-04-08

## 2025-04-08 RX ORDER — DIAZEPAM 2 MG/1
5 TABLET ORAL EVERY 6 HOURS
Refills: 0 | Status: DISCONTINUED | OUTPATIENT
Start: 2025-04-08 | End: 2025-04-11

## 2025-04-08 RX ORDER — OXYBUTYNIN CHLORIDE 5 MG/1
5 TABLET, FILM COATED, EXTENDED RELEASE ORAL EVERY 8 HOURS
Refills: 0 | Status: DISCONTINUED | OUTPATIENT
Start: 2025-04-08 | End: 2025-04-11

## 2025-04-08 RX ADMIN — ROSUVASTATIN CALCIUM 5 MILLIGRAM(S): 5 TABLET, FILM COATED ORAL at 21:13

## 2025-04-08 RX ADMIN — DIAZEPAM 5 MILLIGRAM(S): 2 TABLET ORAL at 17:35

## 2025-04-08 RX ADMIN — SODIUM CHLORIDE 75 MILLILITER(S): 9 INJECTION, SOLUTION INTRAVENOUS at 18:28

## 2025-04-08 RX ADMIN — PREDNISONE 10 MILLIGRAM(S): 20 TABLET ORAL at 17:35

## 2025-04-08 RX ADMIN — Medication 40 MILLIGRAM(S): at 17:35

## 2025-04-08 RX ADMIN — LIDOCAINE HYDROCHLORIDE 1 APPLICATION(S): 20 JELLY TOPICAL at 18:28

## 2025-04-08 RX ADMIN — Medication 200 GRAM(S): at 15:43

## 2025-04-08 RX ADMIN — FINASTERIDE 5 MILLIGRAM(S): 1 TABLET, FILM COATED ORAL at 21:13

## 2025-04-08 RX ADMIN — OXYBUTYNIN CHLORIDE 5 MILLIGRAM(S): 5 TABLET, FILM COATED, EXTENDED RELEASE ORAL at 18:28

## 2025-04-08 NOTE — ED ADULT TRIAGE NOTE - BIRTH SEX
Called for hematuria. Possible history of bladder cancer per nursing. Additionally increased WBC in UA.  -h/h  -Ceftriaxone  Defer workup to primary team in AM.     Lang Marie MD   Male

## 2025-04-08 NOTE — ED PROVIDER NOTE - CLINICAL SUMMARY MEDICAL DECISION MAKING FREE TEXT BOX
Everything is a 69-year-old male past medical history A-fib, hypertension, hyperlipidemia, hiatal hernia, myelofibrosis, BCC, melanoma, anemia, renal cell carcinoma, pancytopenia, bladder cancer status post TURBT presenting for lower abdominal pain and bloody urine.  With initial vital signs nonactionable, presenting with urinary retention in the setting of recent TURBT yesterday with hematuria and clots, now with suprapubic fullness and palpable bladder concerning for obstruction by clots causing retention and discomfort.  Urology at bedside, recommending labs, ultrasound to assess for obstructive etiology, to irrigate Diana. Patient is a 69-year-old male past medical history A-fib, hypertension, hyperlipidemia, hiatal hernia, myelofibrosis, BCC, melanoma, anemia, renal cell carcinoma, pancytopenia, bladder cancer status post TURBT presenting for lower abdominal pain and bloody urine.  With initial vital signs nonactionable, presenting with urinary retention in the setting of recent TURBT yesterday with hematuria and clots, now with suprapubic fullness and palpable bladder concerning for obstruction by clots causing retention and discomfort.  Urology at bedside, recommending labs, ultrasound to assess for obstructive etiology, to irrigate Diana.

## 2025-04-08 NOTE — H&P ADULT - NSICDXPASTSURGICALHX_GEN_ALL_CORE_FT
PAST SURGICAL HISTORY:  H/O cataract extraction     H/O partial nephrectomy right-12/17/12    Left varicocele     Melanoma in situ of back x1-1983, [id number]    Mohs defect mohs procedure bilateral sides of nose with skin graft from clavicular areas 7/2016    S/P cystoscopy TURBT-05/2012    S/P cystoscopy 5/15/14    Status post ablation of atrial fibrillation

## 2025-04-08 NOTE — H&P ADULT - ASSESSMENT
70 Y/O M w/ PMH of Atrial Fibrillation, Hypertension, Hyperlipidemia, Hiatal Hernia, Myelofibrosis, BCC, Melanoma, Anemia, Renal Cell Carcinoma, Pancytopenia, bladder cancer status post TURBT presents to ER for Hematuria. Awoke this morning w/ suprapubic pain while voiding noted bloody urine prompting follow up to clinic. During evaluation 18F Coude placed by team recommending ER for further evaluation. During clinical course H/H 7.6/23.5 from H/H 7.7/23.8 on 4/7 and Cr 0.95. Started on CBI and awaiting U/S to evaluate for clot burden    Continue CBI  Trend H/H   Antispasmodic PRN  Anti-analgesics PRN  Monitor Color  Re-asses in AM 70 Y/O M w/ PMH of Atrial Fibrillation, Hypertension, Hyperlipidemia, Hiatal Hernia, Myelofibrosis, BCC, Melanoma, Anemia, Renal Cell Carcinoma, Pancytopenia, bladder cancer s/p TURBT on 4/7/25 who presents to the ED for post-op hematuria.     Plan:  - Admit under Dr. Carter Love  - Zosyn while in-house given prior cx w/ Enterococcus and pt allergic to cipro   - Continue CBI overnight   - Trend H/H, monitor platelets and Na   - Valium PRN for bladder spasms   - Pain control PRN  - AM labs   - Reassess color in AM    Discussed w/ Dr. Love.    Urology  [id number]

## 2025-04-08 NOTE — H&P ADULT - HISTORY OF PRESENT ILLNESS
68 Y/O M w/ PMH of Atrial Fibrillation, Hypertension, Hyperlipidemia, Hiatal Hernia, Myelofibrosis, BCC, Melanoma, Anemia, Renal Cell Carcinoma, Pancytopenia, bladder cancer status post TURBT presents to ER for Hematuria. Awoke this morning w/ suprapubic pain while voiding noted bloody urine prompting follow up to clinic. During evaluation 18F Coude placed by team recommending ER for further evaluation. During clinical course H/H 7.6/23.5 from H/H 7.7/23.8 on 4/7 and Cr 0.95. Started on CBI and awaiting U/S to evaluate for clot burden           70 Y/O M w/ PMH of Atrial Fibrillation, Hypertension, Hyperlipidemia, Hiatal Hernia, Myelofibrosis, BCC, Melanoma, Anemia, Renal Cell Carcinoma, Pancytopenia, bladder cancer status post TURBT on 4/7/25  presents to ED for Hematuria. Awoke this morning w/ suprapubic pain while voiding noted bloody urine prompting follow up to clinic. During evaluation 18F Coude placed in clinic with return of bloody urine, recommended he go to ED for further evaluation. During clinical course H/H 7.6/23.5 from H/H 7.7/23.8 on 4/7 and Cr 0.95. Irrigated with return of old clot and started on CBI, awaiting U/S to evaluate for clot burden. Pt states he feels improved after clot was irrigated out, just reports some discomfort around catheter. No dizziness or lightheadedness. No f/c at home.

## 2025-04-08 NOTE — ED ADULT TRIAGE NOTE - CHIEF COMPLAINT QUOTE
negative... Pt coming to ER sent from outpatient provider office. Pt s/p blader biopsy to r/o recurrent bladder cancer. Pt states that he started experiencing gross hematuria with clots and bladder spasms since the procedure. Pt noted to be in pain at this time. Hx anemia, myofibrosis, s/p platelet infusion, kidney cancer   Arrives with #20g placed to R forearm. Pt received 10 mg morphine IV prior to arrival.

## 2025-04-08 NOTE — ED ADULT NURSE NOTE - NSFALLUNIVINTERV_ED_ALL_ED
Bed/Stretcher in lowest position, wheels locked, appropriate side rails in place/Call bell, personal items and telephone in reach/Instruct patient to call for assistance before getting out of bed/chair/stretcher/Non-slip footwear applied when patient is off stretcher/[place] to call system/Physically safe environment - no spills, clutter or unnecessary equipment/Purposeful proactive rounding/Room/bathroom lighting operational, light cord in reach

## 2025-04-08 NOTE — H&P ADULT - NSHPPHYSICALEXAM_GEN_ALL_CORE
Vital signs reviewed.   CONSTITUTIONAL: Well-appearing; well-nourished; in no apparent distress.  HEAD: Normocephalic, atraumatic.  EYES: Normal conjunctiva and no sclera injection noted  ENT: Normal nose; no rhinorrhea  NECK/LYMPH: Supple; non-tender; no cervical lymphadenopathy.  CARD: Normal S1, S2  RESP: Normal chest excursion with respiration  ABD/GI: soft, non-distended; non-tender  EXT/MS: moves all extremities; distal pulses are normal, no pedal edema.  SKIN: Normal for age and race  NEURO: Awake, alert, oriented x 3  PSYCH: Normal mood; appropriate affect.

## 2025-04-08 NOTE — H&P ADULT - NSICDXPASTMEDICALHX_GEN_ALL_CORE_FT
PAST MEDICAL HISTORY:  Anemia     Atrial fibrillation on Xarelto    Basal cell carcinoma nose    BPH (benign prostatic hypertrophy) denies    Bulging cervical disc    Cataract of right eye     Chronic cough     Degenerated intervertebral disc lumbar    H/O polycythemia vera     Hiatal hernia     HTN (hypertension)     Hyperlipidemia     Left cataract     Malignant neoplasm of overlapping sites of bladder     Melanoma in situ of skin of trunk     Mild pulmonary hypertension     Muscle atrophy bilateral feet    Myelofibrosis     OAB (overactive bladder)     Peripheral neuropathy "both feet"    Psoriasis (a type of skin inflammation)     Renal cell carcinoma     Sleep apnea     Thrombocytopenia

## 2025-04-08 NOTE — ED PROVIDER NOTE - ATTENDING CONTRIBUTION TO CARE
69M who has a Past medical hx of AF HTN HLD Myelofibrosis HHernia  Y/O M w/ PMH of Atrial Fibrillation, Hypertension, Hyperlipidemia, Hiatal Hernia, BCC, Melanoma, Anemia, Renal Cell Carcinoma, Pancytopenia, bladder cancer status post TURBT on 4/7/25  PTED from clinic for Hematuria with suprapubic pain while voiding. 18F Coude placed in clinic with return of bloody urine so sent to ED for further evaluation. He has had no fever chills, palpitations chest pain dizziness or lightheadedness.   VSS  PE   Labs: pending at time of evaluation  Pt seen by Urology placed on CBI will follow labs and dispo accordingly which as per discussion at time of eval is admission .

## 2025-04-08 NOTE — ED ADULT NURSE NOTE - OBJECTIVE STATEMENT
69 year old male received to rm 7, A&Ox4 ambulatory at baseline. phx: anemia, myofibrosis, s/p platelet infusion, kidney cancer. pt sent from urology office after bladder biopsy. pt c/o sudden onset pain to 18Fr oates catheter, penis and pelvic area at 530am this morning. pt endorses hematuria with clots and bladder spasms. urology PA at bedside irrigating oates catheter. pt declines morphine as pain is relieved by irrigation. pt presents with PIV#20 right forearm by ems and received 10mg morphine by ems prior to arrival. labs drawn and sent. vs as noted. sister-in-law at bedside. awaiting US

## 2025-04-08 NOTE — ED ADULT NURSE NOTE - CHIEF COMPLAINT QUOTE
Pt coming to ER sent from outpatient provider office. Pt s/p blader biopsy to r/o recurrent bladder cancer. Pt states that he started experiencing gross hematuria with clots and bladder spasms since the procedure. Pt noted to be in pain at this time. Hx anemia, myofibrosis, s/p platelet infusion, kidney cancer   Arrives with #20g placed to R forearm. Pt received 10 mg morphine IV prior to arrival.

## 2025-04-08 NOTE — ED PROVIDER NOTE - OBJECTIVE STATEMENT
Everything is a 69-year-old male past medical history A-fib, hypertension, hyperlipidemia, hiatal hernia, myelofibrosis, BCC, melanoma, anemia, renal cell carcinoma, pancytopenia, bladder cancer status post TURBT presenting for lower abdominal pain and bloody urine.  Patient states that following his procedure yesterday for tumor, he was having bleeding and clots, this morning at 5 AM had significant pain to the suprapubic area, was seen by his urologist, attempted to irrigate Diana, sent to the emergency department for further evaluation.  Here stating that he has continued worsening pain to the suprapubic area, that his bags empty and he has not emptied recently.  No associated fevers, vomiting.  No back pain. Patient is a 69-year-old male past medical history A-fib, hypertension, hyperlipidemia, hiatal hernia, myelofibrosis, BCC, melanoma, anemia, renal cell carcinoma, pancytopenia, bladder cancer status post TURBT presenting for lower abdominal pain and bloody urine.  Patient states that following his procedure yesterday for tumor, he was having bleeding and clots, this morning at 5 AM had significant pain to the suprapubic area, was seen by his urologist, attempted to irrigate Diana, sent to the emergency department for further evaluation.  Here stating that he has continued worsening pain to the suprapubic area, that his bags empty and he has not emptied recently.  No associated fevers, vomiting.  No back pain.

## 2025-04-08 NOTE — ED PROVIDER NOTE - PHYSICAL EXAMINATION
CONSTITUTIONAL: awake appears uncomfortable  SKIN: warm, dry  HEAD: Normocephalic; atraumatic.  EYES: no conjunctival injection. no scleral icterus  ENT: No nasal discharge; airway clear.  CARD: S1, S2 normal; no murmurs, gallops, or rubs. Regular rate and rhythm.   RESP: No wheezes, rales or rhonchi. Good air movement bilaterally.   ABD: +palpable bladder with suprapubic fullness/tenderness.  EXT:  No cyanosis or edema.   NEURO: Alert, oriented, grossly unremarkable  PSYCH: Cooperative, appropriate.

## 2025-04-09 DIAGNOSIS — R33.9 RETENTION OF URINE, UNSPECIFIED: ICD-10-CM

## 2025-04-09 PROBLEM — D69.6 THROMBOCYTOPENIA, UNSPECIFIED: Chronic | Status: ACTIVE | Noted: 2025-04-02

## 2025-04-09 PROBLEM — R05.3 CHRONIC COUGH: Chronic | Status: ACTIVE | Noted: 2025-04-02

## 2025-04-09 PROBLEM — D75.81 MYELOFIBROSIS: Chronic | Status: ACTIVE | Noted: 2025-04-02

## 2025-04-09 PROBLEM — C67.8 MALIGNANT NEOPLASM OF OVERLAPPING SITES OF BLADDER: Chronic | Status: ACTIVE | Noted: 2025-04-02

## 2025-04-09 PROBLEM — I27.20 PULMONARY HYPERTENSION, UNSPECIFIED: Chronic | Status: ACTIVE | Noted: 2025-04-02

## 2025-04-09 PROBLEM — K44.9 DIAPHRAGMATIC HERNIA WITHOUT OBSTRUCTION OR GANGRENE: Chronic | Status: ACTIVE | Noted: 2025-04-02

## 2025-04-09 LAB
ANION GAP SERPL CALC-SCNC: 9 MMOL/L — SIGNIFICANT CHANGE UP (ref 7–14)
BUN SERPL-MCNC: 18 MG/DL — SIGNIFICANT CHANGE UP (ref 7–23)
CALCIUM SERPL-MCNC: 8.3 MG/DL — LOW (ref 8.4–10.5)
CHLORIDE SERPL-SCNC: 106 MMOL/L — SIGNIFICANT CHANGE UP (ref 98–107)
CO2 SERPL-SCNC: 23 MMOL/L — SIGNIFICANT CHANGE UP (ref 22–31)
CREAT SERPL-MCNC: 0.86 MG/DL — SIGNIFICANT CHANGE UP (ref 0.5–1.3)
EGFR: 94 ML/MIN/1.73M2 — SIGNIFICANT CHANGE UP
EGFR: 94 ML/MIN/1.73M2 — SIGNIFICANT CHANGE UP
GLUCOSE SERPL-MCNC: 132 MG/DL — HIGH (ref 70–99)
HCT VFR BLD CALC: 20.1 % — CRITICAL LOW (ref 39–50)
HCT VFR BLD CALC: 24.1 % — LOW (ref 39–50)
HGB BLD-MCNC: 6.3 G/DL — CRITICAL LOW (ref 13–17)
HGB BLD-MCNC: 7.9 G/DL — LOW (ref 13–17)
MCHC RBC-ENTMCNC: 29.6 PG — SIGNIFICANT CHANGE UP (ref 27–34)
MCHC RBC-ENTMCNC: 30.9 PG — SIGNIFICANT CHANGE UP (ref 27–34)
MCHC RBC-ENTMCNC: 31.3 G/DL — LOW (ref 32–36)
MCHC RBC-ENTMCNC: 32.8 G/DL — SIGNIFICANT CHANGE UP (ref 32–36)
MCV RBC AUTO: 90.3 FL — SIGNIFICANT CHANGE UP (ref 80–100)
MCV RBC AUTO: 98.5 FL — SIGNIFICANT CHANGE UP (ref 80–100)
NRBC # BLD AUTO: 0.03 K/UL — HIGH (ref 0–0)
NRBC # BLD AUTO: 0.08 K/UL — HIGH (ref 0–0)
NRBC # FLD: 0.03 K/UL — HIGH (ref 0–0)
NRBC # FLD: 0.08 K/UL — HIGH (ref 0–0)
NRBC BLD AUTO-RTO: 1 /100 WBCS — HIGH (ref 0–0)
NRBC BLD AUTO-RTO: 2 /100 WBCS — HIGH (ref 0–0)
PLATELET # BLD AUTO: 32 K/UL — LOW (ref 150–400)
PLATELET # BLD AUTO: 41 K/UL — LOW (ref 150–400)
POTASSIUM SERPL-MCNC: 4.4 MMOL/L — SIGNIFICANT CHANGE UP (ref 3.5–5.3)
POTASSIUM SERPL-SCNC: 4.4 MMOL/L — SIGNIFICANT CHANGE UP (ref 3.5–5.3)
RBC # BLD: 2.04 M/UL — LOW (ref 4.2–5.8)
RBC # BLD: 2.67 M/UL — LOW (ref 4.2–5.8)
RBC # FLD: 21.7 % — HIGH (ref 10.3–14.5)
RBC # FLD: 22.5 % — HIGH (ref 10.3–14.5)
SODIUM SERPL-SCNC: 138 MMOL/L — SIGNIFICANT CHANGE UP (ref 135–145)
SURGICAL PATHOLOGY STUDY: SIGNIFICANT CHANGE UP
WBC # BLD: 3.13 K/UL — LOW (ref 3.8–10.5)
WBC # BLD: 4 K/UL — SIGNIFICANT CHANGE UP (ref 3.8–10.5)
WBC # FLD AUTO: 3.13 K/UL — LOW (ref 3.8–10.5)
WBC # FLD AUTO: 4 K/UL — SIGNIFICANT CHANGE UP (ref 3.8–10.5)

## 2025-04-09 PROCEDURE — 99223 1ST HOSP IP/OBS HIGH 75: CPT

## 2025-04-09 PROCEDURE — 99231 SBSQ HOSP IP/OBS SF/LOW 25: CPT

## 2025-04-09 RX ORDER — ONDANSETRON HCL/PF 4 MG/2 ML
4 VIAL (ML) INJECTION EVERY 6 HOURS
Refills: 0 | Status: DISCONTINUED | OUTPATIENT
Start: 2025-04-09 | End: 2025-04-11

## 2025-04-09 RX ORDER — ACETAMINOPHEN 500 MG/5ML
650 LIQUID (ML) ORAL ONCE
Refills: 0 | Status: COMPLETED | OUTPATIENT
Start: 2025-04-09 | End: 2025-04-09

## 2025-04-09 RX ORDER — TORSEMIDE 10 MG
20 TABLET ORAL ONCE
Refills: 0 | Status: COMPLETED | OUTPATIENT
Start: 2025-04-09 | End: 2025-04-09

## 2025-04-09 RX ADMIN — Medication 40 MILLIGRAM(S): at 18:18

## 2025-04-09 RX ADMIN — OXYBUTYNIN CHLORIDE 5 MILLIGRAM(S): 5 TABLET, FILM COATED, EXTENDED RELEASE ORAL at 05:37

## 2025-04-09 RX ADMIN — Medication 650 MILLIGRAM(S): at 09:58

## 2025-04-09 RX ADMIN — Medication 650 MILLIGRAM(S): at 10:58

## 2025-04-09 RX ADMIN — TAMSULOSIN HYDROCHLORIDE 0.4 MILLIGRAM(S): 0.4 CAPSULE ORAL at 21:45

## 2025-04-09 RX ADMIN — OXYBUTYNIN CHLORIDE 5 MILLIGRAM(S): 5 TABLET, FILM COATED, EXTENDED RELEASE ORAL at 21:49

## 2025-04-09 RX ADMIN — DIAZEPAM 5 MILLIGRAM(S): 2 TABLET ORAL at 00:48

## 2025-04-09 RX ADMIN — Medication 10 MILLIGRAM(S): at 09:58

## 2025-04-09 RX ADMIN — PREDNISONE 10 MILLIGRAM(S): 20 TABLET ORAL at 05:37

## 2025-04-09 RX ADMIN — Medication 40 MILLIGRAM(S): at 05:37

## 2025-04-09 RX ADMIN — Medication 4 MILLIGRAM(S): at 18:39

## 2025-04-09 RX ADMIN — Medication 20 MILLIGRAM(S): at 09:18

## 2025-04-09 RX ADMIN — FINASTERIDE 5 MILLIGRAM(S): 1 TABLET, FILM COATED ORAL at 21:45

## 2025-04-09 RX ADMIN — METOPROLOL SUCCINATE 12.5 MILLIGRAM(S): 50 TABLET, EXTENDED RELEASE ORAL at 05:37

## 2025-04-09 RX ADMIN — OXYBUTYNIN CHLORIDE 5 MILLIGRAM(S): 5 TABLET, FILM COATED, EXTENDED RELEASE ORAL at 13:37

## 2025-04-09 RX ADMIN — ROSUVASTATIN CALCIUM 5 MILLIGRAM(S): 5 TABLET, FILM COATED ORAL at 21:45

## 2025-04-09 NOTE — PROGRESS NOTE ADULT - ATTENDING COMMENTS
Clinically improved, Urine color light pink.  Labs reviewed, transfuse 1 PRBC, platelets  Continue Diana catheter.

## 2025-04-09 NOTE — CONSULT NOTE ADULT - SUBJECTIVE AND OBJECTIVE BOX
[facility] Division of Hospital Medicine  Charly Vargas   Available via MS Teams    Patient is a 69y old  Male who presents with a chief complaint of Hematuria (09 Apr 2025 07:10)      INTERVAL HPI/OVERNIGHT EVENTS:  Patient is a 68yo M with PMH significant for Afib, HTN, HLD, hiatal hernia, myelofibrosis, melanoma, BCC, anemia, RCC, and bladder cancer s/p recent TURBT who presented with hematuria.  States he noticed bleeding from the urinary catheter postoperatively. Denies associated dizziness, lightheadedness, weakness.   Reports having PRBC transfusions as an outpatient every 2-3 weeks, usually pre-medicated, at [facility] through his hematologist. Has been on and off prednisone which helps his symptoms. He is being assessed for candidacy in clinical trials.    Currently reports feeling better. Does admit to a slight non-productive cough which has been present for a few days.    REVIEW OF SYSTEMS:  CONSTITUTIONAL: No fever, weight loss, or fatigue  EYES: No eye pain, visual disturbances, or discharge  ENMT:  No difficulty hearing, tinnitus, vertigo; No sinus or throat pain  NECK: No pain or stiffness  BREASTS: No pain, masses, or nipple discharge  RESPIRATORY: No wheezing, chills or hemoptysis; No shortness of breath  CARDIOVASCULAR: No chest pain, palpitations, dizziness, or leg swelling  GASTROINTESTINAL: No abdominal or epigastric pain. No nausea, vomiting, or hematemesis; No diarrhea or constipation. No melena or hematochezia.  GENITOURINARY: No dysuria, frequency, hematuria, or incontinence  NEUROLOGICAL: No headaches, memory loss, loss of strength, numbness, or tremors  SKIN: No itching, burning, rashes, or lesions   LYMPH NODES: No enlarged glands  ENDOCRINE: No heat or cold intolerance; No hair loss  MUSCULOSKELETAL: No joint pain or swelling; No muscle, back, or extremity pain  PSYCHIATRIC: No depression, anxiety, mood swings, or difficulty sleeping  HEME/LYMPH: No easy bruising, or bleeding gums  ALLERGY AND IMMUNOLOGIC: No hives or eczema    PHYSICAL EXAM:  Vital Signs Last 24 Hrs  T(C): 36.9 (09 Apr 2025 09:15), Max: 36.9 (09 Apr 2025 08:12)  T(F): 98.5 (09 Apr 2025 09:15), Max: 98.5 (09 Apr 2025 08:12)  HR: 77 (09 Apr 2025 09:15) (66 - 77)  BP: 120/56 (09 Apr 2025 09:15) (101/43 - 148/59)  BP(mean): --  RR: 18 (09 Apr 2025 09:15) (16 - 18)  SpO2: 100% (09 Apr 2025 09:15) (99% - 100%)    Parameters below as of 09 Apr 2025 09:15  Patient On (Oxygen Delivery Method): room air      CONSTITUTIONAL: NAD, well-groomed  EYES: PERRLA; conjunctiva and sclera clear  ENMT: Moist oral mucosa, no pharyngeal injection or exudates  NECK: Supple, no palpable masses; no thyromegaly  RESPIRATORY: Normal respiratory effort; lungs are clear to auscultation bilaterally  CARDIOVASCULAR: Regular rate and rhythm, normal S1 and S2, no murmur/rub/gallop; No lower extremity edema; Peripheral pulses are 2+ bilaterally  ABDOMEN: Nontender to palpation, normoactive bowel sounds, no rebound/guarding; No hepatosplenomegaly  MUSCULOSKELETAL: no clubbing or cyanosis of digits; no joint swelling or tenderness to palpation  PSYCH: A+O to person, place, and time; affect appropriate  NEUROLOGY: CN 2-12 are intact and symmetric; no gross sensory deficits   SKIN: No rashes; no palpable lesions    Consultant(s) Notes Reviewed:  [x] YES  [ ] NO  Care Discussed with Consultants/Other Providers [x] YES  [ ] NO    LABS:                        6.3    3.13  )-----------( 32       ( 09 Apr 2025 05:27 )             20.1     04-09    138  |  106  |  18  ----------------------------<  132[H]  4.4   |  23  |  0.86    Ca    8.3[L]      09 Apr 2025 05:27  Phos  4.0     04-07  Mg     2.10     04-07    TPro  6.4  /  Alb  3.5  /  TBili  0.8  /  DBili  x   /  AST  54[H]  /  ALT  28  /  AlkPhos  59  04-08    PT/INR - ( 08 Apr 2025 13:22 )   PT: 13.4 sec;   INR: 1.13 ratio         PTT - ( 08 Apr 2025 13:22 )  PTT:24.9 sec  Urinalysis Basic - ( 09 Apr 2025 05:27 )    Color: x / Appearance: x / SG: x / pH: x  Gluc: 132 mg/dL / Ketone: x  / Bili: x / Urobili: x   Blood: x / Protein: x / Nitrite: x   Leuk Esterase: x / RBC: x / WBC x   Sq Epi: x / Non Sq Epi: x / Bacteria: x      CAPILLARY BLOOD GLUCOSE            Urinalysis Basic - ( 09 Apr 2025 05:27 )    Color: x / Appearance: x / SG: x / pH: x  Gluc: 132 mg/dL / Ketone: x  / Bili: x / Urobili: x   Blood: x / Protein: x / Nitrite: x   Leuk Esterase: x / RBC: x / WBC x   Sq Epi: x / Non Sq Epi: x / Bacteria: x        RADIOLOGY & ADDITIONAL TESTS:

## 2025-04-09 NOTE — CONSULT NOTE ADULT - ASSESSMENT
Patient is a 68yo M with PMH significant for Afib, HTN, HLD, hiatal hernia, myelofibrosis, melanoma, BCC, anemia, RCC, and bladder cancer s/p TURBT who presented with hematuria     #Bladder cancer s/p TURBT  #Post-op hematuria  - trend H/H, keep active T+S, transfuse PRN  - CBI, Zosyn per urology    #Myelofibrosis, transfusion-dependent (receives PRBC outpt q2-3 weeks)  #Acute blood loss anemia due to hematuria superimposed on chronic anemia  #Thrombocytopenia  - c/w prednisone, dose decreased from previous given procedure  - Hb 6.3 on presentation – receiving 1U PRBC, also receiving 1U SDP  - trend H/H, goal Hb >7 (>8 if actively bleeding), goal plt >10k (>50k if actively bleeding)  - low threshold to consult hematology, otherwise f/u outpt at [facility]  - would discuss w/ his hematologist re: prednisone dosing on dc    #Afib  #s/p ablation 1 year ago  - CHADSVASC 2 (age, HTN) would qualify patient for AC use to lower stroke risk, has previously been on Xarelto  - AC on hold given anemia w/ hematuria, not taking as outpt  - f/u w/ PMD regarding when/whether to initiate AC  - c/w home metoprolol    #HLD  - c/w home Crestor    #HTN  - c/w home torsemide, metoprolol  - DASH diet    #Chronic b/l LE edema  - wears compression stockings  - reports TTE has not evidenced HF in the past  - c/w home torsemide  - encourage ambulation   Patient is a 68yo M with PMH significant for Afib, HTN, HLD, hiatal hernia, myelofibrosis, melanoma, BCC, anemia, RCC, and bladder cancer s/p TURBT who presented with hematuria     #Bladder cancer s/p TURBT  #Post-op hematuria  - trend H/H, keep active T+S, transfuse PRN  - CBI, Zosyn per urology    #Myelofibrosis, transfusion-dependent (receives PRBC outpt q2-3 weeks), prev w/ polycythemia vera  #Acute blood loss anemia due to hematuria superimposed on chronic anemia  #Thrombocytopenia  - c/w prednisone, dose decreased from previous given procedure  - Hb 6.3 on presentation – receiving 1U PRBC, also receiving 1U SDP  - trend H/H, goal Hb >7 (>8 if actively bleeding), goal plt >10k (>50k if actively bleeding)  - low threshold to consult hematology, otherwise f/u outpt at [facility]  - would discuss w/ his hematologist re: prednisone dosing on dc    #Afib  #s/p ablation 1 year ago  - CHADSVASC 2 (age, HTN) would qualify patient for AC use to lower stroke risk, has previously been on Xarelto  - AC on hold given anemia w/ hematuria, not taking as outpt  - f/u w/ PMD regarding when/whether to initiate AC  - c/w home metoprolol    #HLD  - c/w home Crestor    #HTN  - c/w home torsemide, metoprolol  - DASH diet    #Chronic b/l LE edema  - wears compression stockings  - reports TTE has not evidenced HF in the past  - c/w home torsemide  - encourage ambulation

## 2025-04-09 NOTE — CONSULT NOTE ADULT - TIME BILLING
Time-based billing (NON-critical care).     80 minutes spent on total encounter; more than 50% of the visit was spent counseling and / or coordinating care by the attending physician.  The necessity of the time spent during the encounter on this date of service was due to:     review of laboratory data, radiology results, consultants' recommendations, documentation in [place], discussion with patient/primary team

## 2025-04-10 ENCOUNTER — TRANSCRIPTION ENCOUNTER (OUTPATIENT)
Age: 70
End: 2025-04-10

## 2025-04-10 LAB
ANION GAP SERPL CALC-SCNC: 11 MMOL/L — SIGNIFICANT CHANGE UP (ref 7–14)
BUN SERPL-MCNC: 22 MG/DL — SIGNIFICANT CHANGE UP (ref 7–23)
CALCIUM SERPL-MCNC: 8.3 MG/DL — LOW (ref 8.4–10.5)
CHLORIDE SERPL-SCNC: 102 MMOL/L — SIGNIFICANT CHANGE UP (ref 98–107)
CO2 SERPL-SCNC: 24 MMOL/L — SIGNIFICANT CHANGE UP (ref 22–31)
CREAT SERPL-MCNC: 1.01 MG/DL — SIGNIFICANT CHANGE UP (ref 0.5–1.3)
EGFR: 81 ML/MIN/1.73M2 — SIGNIFICANT CHANGE UP
EGFR: 81 ML/MIN/1.73M2 — SIGNIFICANT CHANGE UP
GLUCOSE SERPL-MCNC: 111 MG/DL — HIGH (ref 70–99)
HCT VFR BLD CALC: 23.4 % — LOW (ref 39–50)
HGB BLD-MCNC: 7.5 G/DL — LOW (ref 13–17)
MCHC RBC-ENTMCNC: 29.5 PG — SIGNIFICANT CHANGE UP (ref 27–34)
MCHC RBC-ENTMCNC: 32.1 G/DL — SIGNIFICANT CHANGE UP (ref 32–36)
MCV RBC AUTO: 92.1 FL — SIGNIFICANT CHANGE UP (ref 80–100)
NRBC # BLD AUTO: 0.06 K/UL — HIGH (ref 0–0)
NRBC # FLD: 0.06 K/UL — HIGH (ref 0–0)
NRBC BLD AUTO-RTO: 2 /100 WBCS — HIGH (ref 0–0)
PLATELET # BLD AUTO: 33 K/UL — LOW (ref 150–400)
POTASSIUM SERPL-MCNC: 3.9 MMOL/L — SIGNIFICANT CHANGE UP (ref 3.5–5.3)
POTASSIUM SERPL-SCNC: 3.9 MMOL/L — SIGNIFICANT CHANGE UP (ref 3.5–5.3)
RBC # BLD: 2.54 M/UL — LOW (ref 4.2–5.8)
RBC # FLD: 23.2 % — HIGH (ref 10.3–14.5)
SODIUM SERPL-SCNC: 137 MMOL/L — SIGNIFICANT CHANGE UP (ref 135–145)
WBC # BLD: 3.24 K/UL — LOW (ref 3.8–10.5)
WBC # FLD AUTO: 3.24 K/UL — LOW (ref 3.8–10.5)

## 2025-04-10 PROCEDURE — 99231 SBSQ HOSP IP/OBS SF/LOW 25: CPT

## 2025-04-10 PROCEDURE — 99233 SBSQ HOSP IP/OBS HIGH 50: CPT

## 2025-04-10 RX ORDER — PREDNISONE 20 MG/1
1 TABLET ORAL
Refills: 0 | DISCHARGE

## 2025-04-10 RX ORDER — LIDOCAINE HYDROCHLORIDE 20 MG/ML
1 JELLY TOPICAL
Qty: 0 | Refills: 0 | DISCHARGE
Start: 2025-04-10

## 2025-04-10 RX ORDER — OXYBUTYNIN CHLORIDE 5 MG/1
1 TABLET, FILM COATED, EXTENDED RELEASE ORAL
Qty: 15 | Refills: 0
Start: 2025-04-10

## 2025-04-10 RX ORDER — DIAZEPAM 2 MG/1
1 TABLET ORAL
Qty: 8 | Refills: 0
Start: 2025-04-10

## 2025-04-10 RX ORDER — PREDNISONE 20 MG/1
1 TABLET ORAL
Qty: 0 | Refills: 0 | DISCHARGE
Start: 2025-04-10

## 2025-04-10 RX ORDER — NALOXONE HYDROCHLORIDE 0.4 MG/ML
1 INJECTION, SOLUTION INTRAMUSCULAR; INTRAVENOUS; SUBCUTANEOUS
Qty: 1 | Refills: 0
Start: 2025-04-10

## 2025-04-10 RX ORDER — POLYETHYLENE GLYCOL 3350 17 G/17G
17 POWDER, FOR SOLUTION ORAL
Qty: 0 | Refills: 0 | DISCHARGE

## 2025-04-10 RX ADMIN — TAMSULOSIN HYDROCHLORIDE 0.4 MILLIGRAM(S): 0.4 CAPSULE ORAL at 21:45

## 2025-04-10 RX ADMIN — Medication 40 MILLIGRAM(S): at 05:18

## 2025-04-10 RX ADMIN — OXYBUTYNIN CHLORIDE 5 MILLIGRAM(S): 5 TABLET, FILM COATED, EXTENDED RELEASE ORAL at 06:20

## 2025-04-10 RX ADMIN — FINASTERIDE 5 MILLIGRAM(S): 1 TABLET, FILM COATED ORAL at 21:42

## 2025-04-10 RX ADMIN — PREDNISONE 10 MILLIGRAM(S): 20 TABLET ORAL at 05:16

## 2025-04-10 RX ADMIN — METOPROLOL SUCCINATE 12.5 MILLIGRAM(S): 50 TABLET, EXTENDED RELEASE ORAL at 05:17

## 2025-04-10 RX ADMIN — Medication 40 MILLIGRAM(S): at 18:43

## 2025-04-10 RX ADMIN — SODIUM CHLORIDE 75 MILLILITER(S): 9 INJECTION, SOLUTION INTRAVENOUS at 05:16

## 2025-04-10 RX ADMIN — ROSUVASTATIN CALCIUM 5 MILLIGRAM(S): 5 TABLET, FILM COATED ORAL at 21:42

## 2025-04-10 RX ADMIN — OXYBUTYNIN CHLORIDE 5 MILLIGRAM(S): 5 TABLET, FILM COATED, EXTENDED RELEASE ORAL at 21:46

## 2025-04-10 NOTE — DISCHARGE NOTE PROVIDER - CARE PROVIDER_API CALL
Carter Love  Urology  [address], [address]  [place] [address]  Phone: [phone]  Fax: [phone]  Follow Up Time:     Timmy Perez  Critical Care Medicine  [address], [address]  [place] [address]  Phone: [phone]  Fax: [phone]  Follow Up Time:     Virgilio Justice  Cardiovascular Disease  [address] [place] [address]  Phone: [phone]  Fax: [phone]  Follow Up Time:

## 2025-04-10 NOTE — DISCHARGE NOTE PROVIDER - ATTENDING ATTESTATION STATEMENT
04-Mar-2024 17:25 I have personally seen and examined the patient. I have collaborated with and supervised the

## 2025-04-10 NOTE — DISCHARGE NOTE PROVIDER - NSDCFUSCHEDAPPT_GEN_ALL_CORE_FT
Timmy Perez  [facility]  [address]  Scheduled Appointment: 05/12/2025

## 2025-04-10 NOTE — DISCHARGE NOTE PROVIDER - CARE PROVIDERS DIRECT ADDRESSES
[email or web address]

## 2025-04-10 NOTE — DISCHARGE NOTE PROVIDER - NSDCCPCAREPLAN_GEN_ALL_CORE_FT
PRINCIPAL DISCHARGE DIAGNOSIS  Diagnosis: Gross hematuria  Assessment and Plan of Treatment: Empty oates bag as needed, irrigate the oates as instructed if stopped draining with 60cc normal saline and call the office.  Keep hydrated.  No heavy lifting or straining for 4 to 6 weeks, avoid constipation. You may have intermittent pink tinged urine and small amounts of leakage around oates (due to bladder spasms).  This is normal.   If your urine becomes bright red or with clots, or there is no urine in the bag, please call the office.  Dr. Love will call you with a follow up appointment.   Call the office if you have fever greater than 101, no urine in bag, pain not relieved with pain medication, nausea/vomiting.        SECONDARY DISCHARGE DIAGNOSES  Diagnosis: Myelofibrosis  Assessment and Plan of Treatment: Continue current home medications and follow up with your primary care provider      Diagnosis: HTN (hypertension)  Assessment and Plan of Treatment: Continue current home medications and follow up with your primary care provider      Diagnosis: Asthma  Assessment and Plan of Treatment: Continue current home medications and follow up with your primary care provider      Diagnosis: HLD (hyperlipidemia)  Assessment and Plan of Treatment: Continue current home medications and follow up with your primary care provider       PRINCIPAL DISCHARGE DIAGNOSIS  Diagnosis: Gross hematuria  Assessment and Plan of Treatment: Empty oates bag as needed, irrigate the oates as instructed if stopped draining with 60cc normal saline and call the office.  Keep hydrated.  No heavy lifting or straining for 4 to 6 weeks, avoid constipation. You may have intermittent pink tinged urine and small amounts of leakage around oates (due to bladder spasms).  This is normal.   If your urine becomes bright red or with clots, or there is no urine in the bag, please call the office.  Dr. Love will call you with a follow up appointment.   Call the office if you have fever greater than 101, no urine in bag, pain not relieved with pain medication, nausea/vomiting.        SECONDARY DISCHARGE DIAGNOSES  Diagnosis: Myelofibrosis  Assessment and Plan of Treatment: Continue current home medications and follow up with your primary care provider      Diagnosis: HTN (hypertension)  Assessment and Plan of Treatment: Continue current home medications and follow up with your primary care provider      Diagnosis: Asthma  Assessment and Plan of Treatment: Continue current home medications and follow up with your primary care provider      Diagnosis: HLD (hyperlipidemia)  Assessment and Plan of Treatment: Continue current home medications and follow up with your primary care provider      Diagnosis: Afib  Assessment and Plan of Treatment: Follow up with your cardiologist regarding anticoagulation

## 2025-04-10 NOTE — DISCHARGE NOTE NURSING/CASE MANAGEMENT/SOCIAL WORK - NSDCPEFALRISK_GEN_ALL_CORE
For information on Fall & Injury Prevention, visit: [email or web address] OR  [email or web address] OR  https://www.cdc.gov/steadi/patient.html

## 2025-04-10 NOTE — DISCHARGE NOTE PROVIDER - NSDCMRMEDTOKEN_GEN_ALL_CORE_FT
Advair  mcg-21 mcg/inh inhalation aerosol: 2 puff(s) inhaled prn  Albuterol (Eqv-ProAir HFA) 90 mcg/inh inhalation aerosol: 2 puff(s) inhaled prn  finasteride 5 mg oral tablet: 1 tab(s) orally once a day  metoprolol succinate 25 mg oral capsule, extended release: 0.5 cap(s) orally once a day  omeprazole 40 mg oral delayed release capsule: 1 cap(s) orally 2 times a day  predniSONE 20 mg oral tablet: 1 tab(s) orally once a day dose will be reduced to 10mg by 04/06/25  rosuvastatin 5 mg oral tablet: 1 tab(s) orally once a day  tamsulosin 0.4 mg oral capsule: 1 cap(s) orally once a day  torsemide 20 mg oral tablet: 1 tab(s) orally every other day  vitamins and supplements: last dose 04/02/25   Advair  mcg-21 mcg/inh inhalation aerosol: 2 puff(s) inhaled prn  Albuterol (Eqv-ProAir HFA) 90 mcg/inh inhalation aerosol: 2 puff(s) inhaled prn  diazePAM 5 mg oral tablet: 1 tab(s) orally every 6 hours as needed for Bladder spasms MDD: 4  finasteride 5 mg oral tablet: 1 tab(s) orally once a day  lidocaine 5% topical ointment: 1 Apply topically to affected area every 3 hours As needed For catheter discomfort  metoprolol succinate 25 mg oral capsule, extended release: 0.5 cap(s) orally once a day  naloxone 4 mg/0.1 mL nasal spray: 1 spray(s) intranasally once if patient is unconscious, administer and call 911, if no response in patient administer again in other nostril  omeprazole 40 mg oral delayed release capsule: 1 cap(s) orally 2 times a day  oxyBUTYnin 5 mg oral tablet: 1 tab(s) orally every 8 hours as needed for Bladder spasms  polyethylene glycol 3350 oral powder for reconstitution: 17 gram(s) orally once a day as needed for  constipation available over the counter  predniSONE 10 mg oral tablet: 1 tab(s) orally once a day  rosuvastatin 5 mg oral tablet: 1 tab(s) orally once a day  tamsulosin 0.4 mg oral capsule: 1 cap(s) orally once a day  torsemide 20 mg oral tablet: 1 tab(s) orally every other day  zolpidem 5 mg oral tablet: 1 tab(s) orally once a day (at bedtime) do not take with diazepam   acetaminophen 325 mg oral tablet: 3 tab(s) orally every 6 hours as needed for pain  Advair  mcg-21 mcg/inh inhalation aerosol: 2 puff(s) inhaled prn  Albuterol (Eqv-ProAir HFA) 90 mcg/inh inhalation aerosol: 2 puff(s) inhaled prn  diazePAM 5 mg oral tablet: 1 tab(s) orally every 6 hours as needed for Bladder spasms MDD: 4  finasteride 5 mg oral tablet: 1 tab(s) orally once a day  lidocaine 5% topical ointment: 1 Apply topically to affected area every 3 hours As needed For catheter discomfort  metoprolol succinate 25 mg oral capsule, extended release: 0.5 cap(s) orally once a day  naloxone 4 mg/0.1 mL nasal spray: 1 spray(s) intranasally once if patient is unconscious, administer and call 911, if no response in patient administer again in other nostril  omeprazole 40 mg oral delayed release capsule: 1 cap(s) orally 2 times a day  oxyBUTYnin 5 mg oral tablet: 1 tab(s) orally every 8 hours as needed for Bladder spasms  polyethylene glycol 3350 oral powder for reconstitution: 17 gram(s) orally once a day as needed for  constipation available over the counter  predniSONE 10 mg oral tablet: 1 tab(s) orally once a day  rosuvastatin 5 mg oral tablet: 1 tab(s) orally once a day  tamsulosin 0.4 mg oral capsule: 1 cap(s) orally once a day  torsemide 20 mg oral tablet: 1 tab(s) orally every other day  zolpidem 5 mg oral tablet: 1 tab(s) orally once a day (at bedtime) do not take with diazepam

## 2025-04-10 NOTE — DISCHARGE NOTE NURSING/CASE MANAGEMENT/SOCIAL WORK - PATIENT PORTAL LINK FT
You can access the FollowMyHealth Patient Portal offered by [facility] by registering at the following website: [email or web address]. By joining [employer]’s FollowMyHealth portal, you will also be able to view your health information using other applications (apps) compatible with our system.

## 2025-04-10 NOTE — DISCHARGE NOTE NURSING/CASE MANAGEMENT/SOCIAL WORK - FINANCIAL ASSISTANCE
[facility] provides services at a reduced cost to those who are determined to be eligible through [facility]’s financial assistance program. Information regarding [facility]’s financial assistance program can be found by going to [email or web address] or by calling [phone].

## 2025-04-10 NOTE — DISCHARGE NOTE PROVIDER - HOSPITAL COURSE
70 yo M s/p TURBT 4/7/2025 presents to ER with hematuria; irrigated for clots; CBI started; pt has hx low platelets (myelofibrosis) recieved them pre-op; also anemia  4/9 - CBI off; urine pink, transfused 2upRBC and 1 plts with appropriate response  4/10- urine translucent light punch Plts to 33, Hbg 7.5, will transfuse an additional unit of platelets and pRBC, urine remained acceptable in color, pt d/c with oates w/ home care to f/u with Dr. Love 68 yo M s/p TURBT 4/7/2025 presents to ER with hematuria; irrigated for clots; CBI started; pt has hx low platelets (myelofibrosis) recieved them pre-op; also anemia  4/9 - CBI off; urine pink, transfused 2upRBC and 1 plts with appropriate response  4/10- urine translucent light punch Plts to 33, Hbg 7.5, will transfuse an additional unit of platelets and pRBC, urine remained acceptable in color  4/11 - did not require any manual irrigation overnight, labs stable, pt d/c with oates w/ home care to f/u with Dr. Love   68 yo M s/p TURBT 4/7/2025 presents to ER with hematuria; irrigated for clots; CBI started; pt has hx low platelets (myelofibrosis) recieved them pre-op; also anemia  4/9 - CBI off; urine pink, transfused 2upRBC and 1 plts with appropriate response  4/10- urine translucent light punch Plts to 33, Hbg 7.5, will transfuse an additional unit of platelets and pRBC, urine remained acceptable in color  4/11 - did not require any manual irrigation overnight, plts 38, Hgb 8.0, pt d/c with oates w/ home care to f/u with Dr. Love

## 2025-04-11 ENCOUNTER — NON-APPOINTMENT (OUTPATIENT)
Age: 70
End: 2025-04-11

## 2025-04-11 VITALS
SYSTOLIC BLOOD PRESSURE: 110 MMHG | HEART RATE: 79 BPM | TEMPERATURE: 98 F | DIASTOLIC BLOOD PRESSURE: 55 MMHG | RESPIRATION RATE: 16 BRPM | OXYGEN SATURATION: 99 %

## 2025-04-11 LAB
BLD GP AB SCN SERPL QL: NEGATIVE — SIGNIFICANT CHANGE UP
HCT VFR BLD CALC: 24.4 % — LOW (ref 39–50)
HGB BLD-MCNC: 8 G/DL — LOW (ref 13–17)
MCHC RBC-ENTMCNC: 29.3 PG — SIGNIFICANT CHANGE UP (ref 27–34)
MCHC RBC-ENTMCNC: 32.8 G/DL — SIGNIFICANT CHANGE UP (ref 32–36)
MCV RBC AUTO: 89.4 FL — SIGNIFICANT CHANGE UP (ref 80–100)
NRBC # BLD AUTO: 0.04 K/UL — HIGH (ref 0–0)
NRBC # FLD: 0.04 K/UL — HIGH (ref 0–0)
NRBC BLD AUTO-RTO: 1 /100 WBCS — HIGH (ref 0–0)
PLATELET # BLD AUTO: 38 K/UL — LOW (ref 150–400)
RBC # BLD: 2.73 M/UL — LOW (ref 4.2–5.8)
RBC # FLD: 21.8 % — HIGH (ref 10.3–14.5)
RH IG SCN BLD-IMP: POSITIVE — SIGNIFICANT CHANGE UP
WBC # BLD: 3.04 K/UL — LOW (ref 3.8–10.5)
WBC # FLD AUTO: 3.04 K/UL — LOW (ref 3.8–10.5)

## 2025-04-11 PROCEDURE — 99232 SBSQ HOSP IP/OBS MODERATE 35: CPT

## 2025-04-11 PROCEDURE — 99231 SBSQ HOSP IP/OBS SF/LOW 25: CPT

## 2025-04-11 RX ORDER — ACETAMINOPHEN 500 MG/5ML
975 LIQUID (ML) ORAL EVERY 6 HOURS
Refills: 0 | Status: DISCONTINUED | OUTPATIENT
Start: 2025-04-11 | End: 2025-04-11

## 2025-04-11 RX ORDER — ACETAMINOPHEN 500 MG/5ML
3 LIQUID (ML) ORAL
Qty: 0 | Refills: 0 | DISCHARGE
Start: 2025-04-11

## 2025-04-11 RX ORDER — TORSEMIDE 10 MG
20 TABLET ORAL ONCE
Refills: 0 | Status: COMPLETED | OUTPATIENT
Start: 2025-04-11 | End: 2025-04-11

## 2025-04-11 RX ADMIN — PREDNISONE 10 MILLIGRAM(S): 20 TABLET ORAL at 05:20

## 2025-04-11 RX ADMIN — METOPROLOL SUCCINATE 12.5 MILLIGRAM(S): 50 TABLET, EXTENDED RELEASE ORAL at 05:20

## 2025-04-11 RX ADMIN — Medication 975 MILLIGRAM(S): at 06:13

## 2025-04-11 RX ADMIN — Medication 40 MILLIGRAM(S): at 05:21

## 2025-04-11 RX ADMIN — OXYBUTYNIN CHLORIDE 5 MILLIGRAM(S): 5 TABLET, FILM COATED, EXTENDED RELEASE ORAL at 06:14

## 2025-04-11 RX ADMIN — Medication 20 MILLIGRAM(S): at 10:45

## 2025-04-11 RX ADMIN — Medication 975 MILLIGRAM(S): at 07:13

## 2025-04-11 NOTE — PROGRESS NOTE ADULT - SUBJECTIVE AND OBJECTIVE BOX
No new events  Afeb 113/53 66 99%RA    Pt has no new c/o; oates uncomfortable  Abd- soft   Oates/CBI - shut off at 6AM; now light pink  
Overnight events:  CBI remained clear and was clamped this AM    Subjective:  Pt states he feels fatigued but otherwise no complaints    Objective:    Vital signs  T(C): , Max: 37.8 (04-09-25 @ 23:21)  HR: 81 (04-10-25 @ 05:00)  BP: 123/49 (04-10-25 @ 05:00)  SpO2: 96% (04-10-25 @ 05:00)  Wt(kg): --    Output   Diana: light translucent punch    Gen: NAD  Abd: soft, nontender  : иван secured    Labs                        7.5    3.24  )-----------( 33       ( 10 Apr 2025 05:09 )             23.4     10 Apr 2025 05:09    137    |  102    |  22     ----------------------------<  111    3.9     |  24     |  1.01     Ca    8.3        10 Apr 2025 05:09        
[facility]  Charly VargasDO  Available via MS Teams    SUBJECTIVE / OVERNIGHT EVENTS:  No acute events overnight. Patient seen and examined at bedside this morning.  States he feels well. Reports he starting having hematuria into the catheter again after walking.  Is anxious about needing another transfusion over the holiday coming up.    ADDITIONAL REVIEW OF SYSTEMS:    MEDICATIONS  (STANDING):  finasteride 5 milliGRAM(s) Oral daily  lactated ringers. 1000 milliLiter(s) (75 mL/Hr) IV Continuous <Continuous>  metoprolol succinate ER 12.5 milliGRAM(s) Oral daily  pantoprazole    Tablet 40 milliGRAM(s) Oral two times a day  piperacillin/tazobactam IVPB.. 3.375 Gram(s) IV Intermittent Once  predniSONE   Tablet 10 milliGRAM(s) Oral daily  rosuvastatin 5 milliGRAM(s) Oral at bedtime  tamsulosin 0.4 milliGRAM(s) Oral at bedtime    MEDICATIONS  (PRN):  albuterol    90 MICROgram(s) HFA Inhaler 2 Puff(s) Inhalation every 6 hours PRN Bronchospasm  diazepam    Tablet 5 milliGRAM(s) Oral every 6 hours PRN Bladder spasms  lidocaine 5% Ointment 1 Application(s) Topical every 3 hours PRN For catheter discomfort  ondansetron Injectable 4 milliGRAM(s) IV Push every 6 hours PRN Nausea and/or Vomiting  oxybutynin 5 milliGRAM(s) Oral every 8 hours PRN Bladder spasms      I&O's Summary    10 Apr 2025 07:01  -  10 Apr 2025 11:42  --------------------------------------------------------  IN: 0 mL / OUT: 375 mL / NET: -375 mL        PHYSICAL EXAM:  Vital Signs Last 24 Hrs  T(C): 37.3 (10 Apr 2025 09:42), Max: 37.8 (09 Apr 2025 23:21)  T(F): 99.1 (10 Apr 2025 09:42), Max: 100 (09 Apr 2025 23:21)  HR: 75 (10 Apr 2025 09:42) (72 - 84)  BP: 122/52 (10 Apr 2025 09:42) (115/55 - 131/56)  BP(mean): --  RR: 17 (10 Apr 2025 09:42) (17 - 18)  SpO2: 98% (10 Apr 2025 09:42) (96% - 100%)    Parameters below as of 10 Apr 2025 09:42  Patient On (Oxygen Delivery Method): room air      CONSTITUTIONAL: NAD, well-groomed  EYES: PERRLA; conjunctiva and sclera clear  ENMT: Moist oral mucosa, no pharyngeal injection or exudates  NECK: Supple, no palpable masses; no thyromegaly  RESPIRATORY: Normal respiratory effort; lungs are clear to auscultation bilaterally  CARDIOVASCULAR: Regular rate and rhythm, normal S1 and S2, no murmur/rub/gallop; No lower extremity edema; Peripheral pulses are 2+ bilaterally  ABDOMEN: Nontender to palpation, normoactive bowel sounds, no rebound/guarding; No hepatosplenomegaly  MUSCULOSKELETAL: no clubbing or cyanosis of digits; no joint swelling or tenderness to palpation  PSYCH: A+O to person, place, and time; affect appropriate  NEUROLOGY: CN 2-12 are intact and symmetric; no gross sensory deficits   SKIN: No rashes; no palpable lesions  : oates draining punch colored urine    LABS:                        7.5    3.24  )-----------( 33       ( 10 Apr 2025 05:09 )             23.4     04-10    137  |  102  |  22  ----------------------------<  111[H]  3.9   |  24  |  1.01    Ca    8.3[L]      10 Apr 2025 05:09    TPro  6.4  /  Alb  3.5  /  TBili  0.8  /  DBili  x   /  AST  54[H]  /  ALT  28  /  AlkPhos  59  04-08    PT/INR - ( 08 Apr 2025 13:22 )   PT: 13.4 sec;   INR: 1.13 ratio         PTT - ( 08 Apr 2025 13:22 )  PTT:24.9 sec      Urinalysis Basic - ( 10 Apr 2025 05:09 )    Color: x / Appearance: x / SG: x / pH: x  Gluc: 111 mg/dL / Ketone: x  / Bili: x / Urobili: x   Blood: x / Protein: x / Nitrite: x   Leuk Esterase: x / RBC: x / WBC x   Sq Epi: x / Non Sq Epi: x / Bacteria: x        Urinalysis with Rflx Culture (collected 08 Apr 2025 15:40)      
Overnight events:  None, did not require any manual irrigations    Subjective:  Pt c/o slight throat irritation otherwise no complaints    Objective:    Vital signs  T(C): , Max: 37.3 (04-10-25 @ 09:42)  HR: 78 (04-11-25 @ 05:02)  BP: 128/53 (04-11-25 @ 05:02)  SpO2: 96% (04-11-25 @ 05:02)  Wt(kg): --    Output   Иван: 2700 very light translucent watermelon  04-10 @ 07:01  -  04-11 @ 07:00  --------------------------------------------------------  IN: 450 mL / OUT: 3075 mL / NET: -2625 mL        Gen: NAD  Abd: soft, nontender  : иван secured    Labs: pending this AM                        7.5    3.24  )-----------( 33       ( 10 Apr 2025 05:09 )             23.4     10 Apr 2025 05:09    137    |  102    |  22     ----------------------------<  111    3.9     |  24     |  1.01     Ca    8.3        10 Apr 2025 05:09        
[facility]  Charly VargasDO  Available via MS Teams    SUBJECTIVE / OVERNIGHT EVENTS:  No acute events overnight. Patient seen and examined at bedside this morning.  States he feels well, does report some nasal congestion, but overall improving. Has been compliant with the incentive spirometer.    ADDITIONAL REVIEW OF SYSTEMS:    MEDICATIONS  (STANDING):  finasteride 5 milliGRAM(s) Oral daily  lactated ringers. 1000 milliLiter(s) (75 mL/Hr) IV Continuous <Continuous>  metoprolol succinate ER 12.5 milliGRAM(s) Oral daily  pantoprazole    Tablet 40 milliGRAM(s) Oral two times a day  piperacillin/tazobactam IVPB.. 3.375 Gram(s) IV Intermittent Once  predniSONE   Tablet 10 milliGRAM(s) Oral daily  rosuvastatin 5 milliGRAM(s) Oral at bedtime  tamsulosin 0.4 milliGRAM(s) Oral at bedtime  torsemide 20 milliGRAM(s) Oral once    MEDICATIONS  (PRN):  acetaminophen     Tablet .. 975 milliGRAM(s) Oral every 6 hours PRN Temp greater or equal to 38C (100.4F), Mild Pain (1 - 3), Moderate Pain (4 - 6)  albuterol    90 MICROgram(s) HFA Inhaler 2 Puff(s) Inhalation every 6 hours PRN Bronchospasm  benzocaine/menthol Lozenge 1 Lozenge Oral four times a day PRN Sore Throat  diazepam    Tablet 5 milliGRAM(s) Oral every 6 hours PRN Bladder spasms  lidocaine 5% Ointment 1 Application(s) Topical every 3 hours PRN For catheter discomfort  ondansetron Injectable 4 milliGRAM(s) IV Push every 6 hours PRN Nausea and/or Vomiting  oxybutynin 5 milliGRAM(s) Oral every 8 hours PRN Bladder spasms      I&O's Summary    10 Apr 2025 07:01  -  11 Apr 2025 07:00  --------------------------------------------------------  IN: 450 mL / OUT: 3075 mL / NET: -2625 mL        PHYSICAL EXAM:  Vital Signs Last 24 Hrs  T(C): 36.7 (11 Apr 2025 09:00), Max: 37.3 (11 Apr 2025 01:51)  T(F): 98 (11 Apr 2025 09:00), Max: 99.1 (11 Apr 2025 01:51)  HR: 79 (11 Apr 2025 09:00) (68 - 79)  BP: 110/55 (11 Apr 2025 09:00) (110/55 - 132/57)  BP(mean): --  RR: 16 (11 Apr 2025 09:00) (16 - 18)  SpO2: 99% (11 Apr 2025 09:00) (96% - 100%)    Parameters below as of 11 Apr 2025 09:00  Patient On (Oxygen Delivery Method): room air        LABS:                        8.0    3.04  )-----------( 38       ( 11 Apr 2025 05:17 )             24.4     04-10    137  |  102  |  22  ----------------------------<  111[H]  3.9   |  24  |  1.01    Ca    8.3[L]      10 Apr 2025 05:09            Urinalysis Basic - ( 10 Apr 2025 05:09 )    Color: x / Appearance: x / SG: x / pH: x  Gluc: 111 mg/dL / Ketone: x  / Bili: x / Urobili: x   Blood: x / Protein: x / Nitrite: x   Leuk Esterase: x / RBC: x / WBC x   Sq Epi: x / Non Sq Epi: x / Bacteria: x        Urinalysis with Rflx Culture (collected 08 Apr 2025 15:40)

## 2025-04-11 NOTE — PROGRESS NOTE ADULT - ASSESSMENT
70 yo M s/p TURBT 4/7/2025 presents to ER with hematuria; irrigated for clots; CBI started; pt has hx low platelets (myelofibrosis) recieved them pre-op; also anemia  4/9 - CBI off; urine pink, transfused 2upRBC and 1 plts with appropriate response  4/10- urine translucent light punch Plts to 33, Hbg 7.5, will transfuse an additional unit of platelets and pRBC  4/11 - did not require any manual irrigations overnight, urine very light translucent watermelon 2700    Plan:  - f/u AM labs  - home care referral  - OOB  - will irrigate prior to d/c if d/c today  - иван teaching  - f/u medicine  - d/c home to f/u with Dr. Love, to f/u with cardiologist re: AC
This 70 yo M s/p TURBT 4/7 presents to ER with hematuria; irrigated for clots; CBI started; pt has hx low platelets (myelofibrosis) recieved them pre-op; also anemia  4/9 - CBI off; urine pink  Plan:  - labs  - continue to observe color  - OOB
70 yo M s/p TURBT 4/7/2025 presents to ER with hematuria; irrigated for clots; CBI started; pt has hx low platelets (myelofibrosis) recieved them pre-op; also anemia  4/9 - CBI off; urine pink, transfused 2upRBC and 1 plts with appropriate response  4/10- urine translucent light punch Plts to 33, Hbg 7.5, will transfuse an additional unit of platelets    Plan:  - AM labs reviewed  - transfuse additional 1u plts  - continue to observe color  - OOB  - will irrigate prior to d/c if d/c today  - oates teaching  - f/u medicine
Patient is a 70yo M with PMH significant for Afib, HTN, HLD, hiatal hernia, myelofibrosis, melanoma, BCC, anemia, RCC, and bladder cancer s/p TURBT who presented with hematuria     #Bladder cancer s/p TURBT  #Post-op hematuria  - trend H/H, keep active T+S, transfuse PRN  - CBI, Zosyn per urology    #Myelofibrosis, transfusion-dependent (receives PRBC outpt q2-3 weeks), prev w/ polycythemia vera  #Acute blood loss anemia due to hematuria superimposed on chronic anemia  #Thrombocytopenia  - c/w prednisone, dose decreased from previous given procedure  - Hb 6.3 on presentation – received 1U PRBC, 2U SDP  - can offer additional unit of PRBC today  - trend H/H, goal Hb >7 (>8 if actively bleeding), goal plt >10k (>50k if actively bleeding)  - low threshold to consult hematology, otherwise f/u outpt at [facility]  - would discuss w/ his hematologist re: prednisone dosing on dc    #Afib  #s/p ablation 1 year ago  - CHADSVASC 2 (age, HTN) would qualify patient for AC use to lower stroke risk, has previously been on Xarelto  - AC on hold given anemia w/ hematuria, not taking as outpt  - f/u w/ PMD regarding when/whether to initiate AC  - c/w home metoprolol    #HLD  - c/w home Crestor    #HTN  - c/w home torsemide, metoprolol  - DASH diet    #Chronic b/l LE edema  - wears compression stockings  - reports TTE has not evidenced HF in the past  - c/w home torsemide  - encourage ambulation    Rest of care per primary team.
Patient is a 70yo M with PMH significant for Afib, HTN, HLD, hiatal hernia, myelofibrosis, melanoma, BCC, anemia, RCC, and bladder cancer s/p TURBT who presented with hematuria     #Bladder cancer s/p TURBT  #Post-op hematuria  - trend H/H, keep active T+S, transfuse PRN  - CBI, Zosyn per urology    #Myelofibrosis, transfusion-dependent (receives PRBC outpt q2-3 weeks), prev w/ polycythemia vera  #Acute blood loss anemia due to hematuria superimposed on chronic anemia  #Thrombocytopenia  - c/w prednisone, dose decreased from previous given procedure  - Hb 6.3 on presentation – received 2U PRBC, 2U SDP  - trend H/H, goal Hb >7 (>8 if actively bleeding), goal plt >10k (>50k if actively bleeding)  - low threshold to consult hematology, otherwise f/u outpt at [facility]  - would discuss w/ his hematologist re: prednisone dosing on dc    #Afib  #s/p ablation 1 year ago  - CHADSVASC 2 (age, HTN) would qualify patient for AC use to lower stroke risk, has previously been on Xarelto  - AC on hold given anemia w/ hematuria, not taking as outpt  - f/u w/ PMD regarding when/whether to initiate AC  - c/w home metoprolol    #HLD  - c/w home Crestor    #HTN  - c/w home torsemide, metoprolol  - DASH diet    #Chronic b/l LE edema  - wears compression stockings  - reports TTE has not evidenced HF in the past  - c/w home torsemide  - encourage ambulation    Rest of care per primary team.

## 2025-04-11 NOTE — PROGRESS NOTE ADULT - TIME BILLING
Time-based billing (NON-critical care).     36 minutes spent on total encounter; more than 50% of the visit was spent counseling and / or coordinating care by the attending physician.  The necessity of the time spent during the encounter on this date of service was due to:     review of laboratory data, radiology results, consultants' recommendations, documentation in [place], discussion with patient/wife and primary team
Time-based billing (NON-critical care).     50 minutes spent on total encounter; more than 50% of the visit was spent counseling and / or coordinating care by the attending physician.  The necessity of the time spent during the encounter on this date of service was due to:     review of laboratory data, radiology results, consultants' recommendations, documentation in [place], discussion with patient/wife and primary team

## 2025-04-12 PROBLEM — H26.9 UNSPECIFIED CATARACT: Chronic | Status: ACTIVE | Noted: 2025-04-02

## 2025-04-15 ENCOUNTER — OUTPATIENT (OUTPATIENT)
Dept: OUTPATIENT SERVICES | Facility: HOSPITAL | Age: 70
LOS: 1 days | End: 2025-04-15
Payer: MEDICARE

## 2025-04-15 ENCOUNTER — APPOINTMENT (OUTPATIENT)
Dept: UROLOGY | Facility: CLINIC | Age: 70
End: 2025-04-15

## 2025-04-15 ENCOUNTER — APPOINTMENT (OUTPATIENT)
Dept: UROLOGY | Facility: CLINIC | Age: 70
End: 2025-04-15
Payer: MEDICARE

## 2025-04-15 VITALS — HEART RATE: 70 BPM | SYSTOLIC BLOOD PRESSURE: 135 MMHG | RESPIRATION RATE: 16 BRPM | DIASTOLIC BLOOD PRESSURE: 71 MMHG

## 2025-04-15 DIAGNOSIS — Z98.89 OTHER SPECIFIED POSTPROCEDURAL STATES: Chronic | ICD-10-CM

## 2025-04-15 DIAGNOSIS — Z98.49 CATARACT EXTRACTION STATUS, UNSPECIFIED EYE: Chronic | ICD-10-CM

## 2025-04-15 DIAGNOSIS — R31.0 GROSS HEMATURIA: ICD-10-CM

## 2025-04-15 DIAGNOSIS — R35.0 FREQUENCY OF MICTURITION: ICD-10-CM

## 2025-04-15 DIAGNOSIS — M95.9 ACQUIRED DEFORMITY OF MUSCULOSKELETAL SYSTEM, UNSPECIFIED: Chronic | ICD-10-CM

## 2025-04-15 DIAGNOSIS — R33.9 RETENTION OF URINE, UNSPECIFIED: ICD-10-CM

## 2025-04-15 PROCEDURE — 51700 IRRIGATION OF BLADDER: CPT

## 2025-04-16 ENCOUNTER — APPOINTMENT (OUTPATIENT)
Dept: UROLOGY | Facility: CLINIC | Age: 70
End: 2025-04-16
Payer: MEDICARE

## 2025-04-16 DIAGNOSIS — R33.9 RETENTION OF URINE, UNSPECIFIED: ICD-10-CM

## 2025-04-16 DIAGNOSIS — R31.0 GROSS HEMATURIA: ICD-10-CM

## 2025-04-16 DIAGNOSIS — R35.1 NOCTURIA: ICD-10-CM

## 2025-04-16 PROCEDURE — 99213 OFFICE O/P EST LOW 20 MIN: CPT

## 2025-04-16 PROCEDURE — G2211 COMPLEX E/M VISIT ADD ON: CPT

## 2025-04-16 RX ORDER — OXYBUTYNIN CHLORIDE 10 MG/1
10 TABLET, EXTENDED RELEASE ORAL
Qty: 30 | Refills: 3 | Status: ACTIVE | COMMUNITY
Start: 2025-04-16 | End: 1900-01-01

## 2025-04-17 DIAGNOSIS — C67.8 MALIGNANT NEOPLASM OF OVERLAPPING SITES OF BLADDER: ICD-10-CM

## 2025-04-18 ENCOUNTER — NON-APPOINTMENT (OUTPATIENT)
Age: 70
End: 2025-04-18

## 2025-04-19 LAB
APPEARANCE: ABNORMAL
BACTERIA: ABNORMAL /HPF
BILIRUBIN URINE: NEGATIVE
BLOOD URINE: ABNORMAL
CAST: 2 /LPF
COLOR: YELLOW
EPITHELIAL CELLS: 0 /HPF
GLUCOSE QUALITATIVE U: NEGATIVE MG/DL
KETONES URINE: NEGATIVE MG/DL
LEUKOCYTE ESTERASE URINE: ABNORMAL
MICROSCOPIC-UA: NORMAL
NITRITE URINE: NEGATIVE
PH URINE: 7.5
PROTEIN URINE: 30 MG/DL
RED BLOOD CELLS URINE: 14 /HPF
SPECIFIC GRAVITY URINE: 1.01
UROBILINOGEN URINE: 0.2 MG/DL
WHITE BLOOD CELLS URINE: 470 /HPF

## 2025-04-21 DIAGNOSIS — N39.0 URINARY TRACT INFECTION, SITE NOT SPECIFIED: ICD-10-CM

## 2025-04-21 LAB — BACTERIA UR CULT: ABNORMAL

## 2025-04-21 RX ORDER — AMOXICILLIN AND CLAVULANATE POTASSIUM 875; 125 MG/1; MG/1
875-125 TABLET, COATED ORAL TWICE DAILY
Qty: 14 | Refills: 0 | Status: COMPLETED | COMMUNITY
Start: 2025-04-21 | End: 2025-04-28

## 2025-05-02 ENCOUNTER — EMERGENCY (EMERGENCY)
Facility: HOSPITAL | Age: 70
LOS: 1 days | End: 2025-05-02
Attending: EMERGENCY MEDICINE | Admitting: EMERGENCY MEDICINE
Payer: MEDICARE

## 2025-05-02 VITALS
DIASTOLIC BLOOD PRESSURE: 66 MMHG | WEIGHT: 199.96 LBS | TEMPERATURE: 98 F | RESPIRATION RATE: 16 BRPM | OXYGEN SATURATION: 96 % | HEART RATE: 79 BPM | SYSTOLIC BLOOD PRESSURE: 136 MMHG | HEIGHT: 71 IN

## 2025-05-02 VITALS
RESPIRATION RATE: 17 BRPM | OXYGEN SATURATION: 98 % | TEMPERATURE: 99 F | HEART RATE: 76 BPM | SYSTOLIC BLOOD PRESSURE: 104 MMHG | DIASTOLIC BLOOD PRESSURE: 57 MMHG

## 2025-05-02 DIAGNOSIS — Z98.89 OTHER SPECIFIED POSTPROCEDURAL STATES: Chronic | ICD-10-CM

## 2025-05-02 DIAGNOSIS — M95.9 ACQUIRED DEFORMITY OF MUSCULOSKELETAL SYSTEM, UNSPECIFIED: Chronic | ICD-10-CM

## 2025-05-02 DIAGNOSIS — N39.0 URINARY TRACT INFECTION, SITE NOT SPECIFIED: ICD-10-CM

## 2025-05-02 DIAGNOSIS — Z98.49 CATARACT EXTRACTION STATUS, UNSPECIFIED EYE: Chronic | ICD-10-CM

## 2025-05-02 DIAGNOSIS — Z98.890 OTHER SPECIFIED POSTPROCEDURAL STATES: Chronic | ICD-10-CM

## 2025-05-02 LAB
ALBUMIN SERPL ELPH-MCNC: 3.6 G/DL — SIGNIFICANT CHANGE UP (ref 3.3–5)
ALP SERPL-CCNC: 59 U/L — SIGNIFICANT CHANGE UP (ref 40–120)
ALT FLD-CCNC: 23 U/L — SIGNIFICANT CHANGE UP (ref 4–41)
ANION GAP SERPL CALC-SCNC: 10 MMOL/L — SIGNIFICANT CHANGE UP (ref 7–14)
ANISOCYTOSIS BLD QL: SLIGHT — SIGNIFICANT CHANGE UP
APPEARANCE UR: CLEAR — SIGNIFICANT CHANGE UP
APTT BLD: 29.4 SEC — SIGNIFICANT CHANGE UP (ref 26.1–36.8)
AST SERPL-CCNC: 42 U/L — HIGH (ref 4–40)
BACTERIA # UR AUTO: NEGATIVE /HPF — SIGNIFICANT CHANGE UP
BASOPHILS # BLD AUTO: 0.11 K/UL — SIGNIFICANT CHANGE UP (ref 0–0.2)
BASOPHILS NFR BLD AUTO: 3.5 % — HIGH (ref 0–2)
BILIRUB SERPL-MCNC: 1 MG/DL — SIGNIFICANT CHANGE UP (ref 0.2–1.2)
BILIRUB UR-MCNC: NEGATIVE — SIGNIFICANT CHANGE UP
BUN SERPL-MCNC: 19 MG/DL — SIGNIFICANT CHANGE UP (ref 7–23)
CALCIUM SERPL-MCNC: 9 MG/DL — SIGNIFICANT CHANGE UP (ref 8.4–10.5)
CAST: 0 /LPF — SIGNIFICANT CHANGE UP (ref 0–4)
CHLORIDE SERPL-SCNC: 102 MMOL/L — SIGNIFICANT CHANGE UP (ref 98–107)
CO2 SERPL-SCNC: 23 MMOL/L — SIGNIFICANT CHANGE UP (ref 22–31)
COLOR SPEC: SIGNIFICANT CHANGE UP
CREAT SERPL-MCNC: 0.87 MG/DL — SIGNIFICANT CHANGE UP (ref 0.5–1.3)
DIFF PNL FLD: ABNORMAL
EGFR: 94 ML/MIN/1.73M2 — SIGNIFICANT CHANGE UP
EGFR: 94 ML/MIN/1.73M2 — SIGNIFICANT CHANGE UP
EOSINOPHIL # BLD AUTO: 0 K/UL — SIGNIFICANT CHANGE UP (ref 0–0.5)
EOSINOPHIL NFR BLD AUTO: 0 % — SIGNIFICANT CHANGE UP (ref 0–6)
FLUAV AG NPH QL: SIGNIFICANT CHANGE UP
FLUBV AG NPH QL: SIGNIFICANT CHANGE UP
GLUCOSE SERPL-MCNC: 171 MG/DL — HIGH (ref 70–99)
GLUCOSE UR QL: NEGATIVE MG/DL — SIGNIFICANT CHANGE UP
HCT VFR BLD CALC: 26.1 % — LOW (ref 39–50)
HGB BLD-MCNC: 8.5 G/DL — LOW (ref 13–17)
HYPOCHROMIA BLD QL: SLIGHT — SIGNIFICANT CHANGE UP
IANC: 2.16 K/UL — SIGNIFICANT CHANGE UP (ref 1.8–7.4)
INR BLD: 1.21 RATIO — HIGH (ref 0.85–1.16)
KETONES UR-MCNC: NEGATIVE MG/DL — SIGNIFICANT CHANGE UP
LACTATE SERPL-SCNC: 2 MMOL/L — SIGNIFICANT CHANGE UP (ref 0.5–2)
LEUKOCYTE ESTERASE UR-ACNC: ABNORMAL
LYMPHOCYTES # BLD AUTO: 0.27 K/UL — LOW (ref 1–3.3)
LYMPHOCYTES # BLD AUTO: 8.7 % — LOW (ref 13–44)
MACROCYTES BLD QL: SLIGHT — SIGNIFICANT CHANGE UP
MANUAL SMEAR VERIFICATION: SIGNIFICANT CHANGE UP
MCHC RBC-ENTMCNC: 29.9 PG — SIGNIFICANT CHANGE UP (ref 27–34)
MCHC RBC-ENTMCNC: 32.6 G/DL — SIGNIFICANT CHANGE UP (ref 32–36)
MCV RBC AUTO: 91.9 FL — SIGNIFICANT CHANGE UP (ref 80–100)
METAMYELOCYTES # FLD: 0.9 % — SIGNIFICANT CHANGE UP (ref 0–1)
METAMYELOCYTES NFR BLD: 0.9 % — SIGNIFICANT CHANGE UP (ref 0–1)
MONOCYTES # BLD AUTO: 0.14 K/UL — SIGNIFICANT CHANGE UP (ref 0–0.9)
MONOCYTES NFR BLD AUTO: 4.3 % — SIGNIFICANT CHANGE UP (ref 2–14)
MYELOCYTES NFR BLD: 0.9 % — HIGH (ref 0–0)
NEUTROPHILS # BLD AUTO: 2.46 K/UL — SIGNIFICANT CHANGE UP (ref 1.8–7.4)
NEUTROPHILS NFR BLD AUTO: 73 % — SIGNIFICANT CHANGE UP (ref 43–77)
NEUTS BAND # BLD: 5.2 % — SIGNIFICANT CHANGE UP (ref 0–6)
NEUTS BAND NFR BLD: 5.2 % — SIGNIFICANT CHANGE UP (ref 0–6)
NITRITE UR-MCNC: POSITIVE
NRBC # BLD: 2 /100 WBCS — HIGH (ref 0–0)
NRBC BLD-RTO: 2 /100 WBCS — HIGH (ref 0–0)
OVALOCYTES BLD QL SMEAR: SLIGHT — SIGNIFICANT CHANGE UP
PH UR: 7 — SIGNIFICANT CHANGE UP (ref 5–8)
PLAT MORPH BLD: NORMAL — SIGNIFICANT CHANGE UP
PLATELET # BLD AUTO: 28 K/UL — LOW (ref 150–400)
PLATELET COUNT - ESTIMATE: ABNORMAL
POIKILOCYTOSIS BLD QL AUTO: SLIGHT — SIGNIFICANT CHANGE UP
POLYCHROMASIA BLD QL SMEAR: SLIGHT — SIGNIFICANT CHANGE UP
POTASSIUM SERPL-MCNC: 4.5 MMOL/L — SIGNIFICANT CHANGE UP (ref 3.5–5.3)
POTASSIUM SERPL-SCNC: 4.5 MMOL/L — SIGNIFICANT CHANGE UP (ref 3.5–5.3)
PROT SERPL-MCNC: 6.7 G/DL — SIGNIFICANT CHANGE UP (ref 6–8.3)
PROT UR-MCNC: NEGATIVE MG/DL — SIGNIFICANT CHANGE UP
PROTHROM AB SERPL-ACNC: 14 SEC — HIGH (ref 9.9–13.4)
RBC # BLD: 2.84 M/UL — LOW (ref 4.2–5.8)
RBC # FLD: 22.5 % — HIGH (ref 10.3–14.5)
RBC BLD AUTO: ABNORMAL
RBC CASTS # UR COMP ASSIST: 0 /HPF — SIGNIFICANT CHANGE UP (ref 0–4)
REVIEW: SIGNIFICANT CHANGE UP
RSV RNA NPH QL NAA+NON-PROBE: SIGNIFICANT CHANGE UP
SARS-COV-2 RNA SPEC QL NAA+PROBE: SIGNIFICANT CHANGE UP
SCHISTOCYTES BLD QL AUTO: SLIGHT — SIGNIFICANT CHANGE UP
SODIUM SERPL-SCNC: 135 MMOL/L — SIGNIFICANT CHANGE UP (ref 135–145)
SOURCE RESPIRATORY: SIGNIFICANT CHANGE UP
SP GR SPEC: 1.01 — SIGNIFICANT CHANGE UP (ref 1–1.03)
SQUAMOUS # UR AUTO: 1 /HPF — SIGNIFICANT CHANGE UP (ref 0–5)
UROBILINOGEN FLD QL: 1 MG/DL — SIGNIFICANT CHANGE UP (ref 0.2–1)
VARIANT LYMPHS # BLD: 3.5 % — SIGNIFICANT CHANGE UP (ref 0–6)
VARIANT LYMPHS NFR BLD MANUAL: 3.5 % — SIGNIFICANT CHANGE UP (ref 0–6)
WBC # BLD: 3.14 K/UL — LOW (ref 3.8–10.5)
WBC # FLD AUTO: 3.14 K/UL — LOW (ref 3.8–10.5)
WBC UR QL: 30 /HPF — HIGH (ref 0–5)

## 2025-05-02 PROCEDURE — 93010 ELECTROCARDIOGRAM REPORT: CPT

## 2025-05-02 PROCEDURE — 99285 EMERGENCY DEPT VISIT HI MDM: CPT | Mod: GC

## 2025-05-02 RX ORDER — SODIUM CHLORIDE 9 G/1000ML
1000 INJECTION, SOLUTION INTRAVENOUS ONCE
Refills: 0 | Status: COMPLETED | OUTPATIENT
Start: 2025-05-02 | End: 2025-05-02

## 2025-05-02 RX ORDER — LIDOCAINE HYDROCHLORIDE 20 MG/ML
1 JELLY TOPICAL
Qty: 1 | Refills: 0
Start: 2025-05-02

## 2025-05-02 RX ORDER — PIPERACILLIN-TAZO-DEXTROSE,ISO 2.25G/50ML
3.38 IV SOLUTION, PIGGYBACK PREMIX FROZEN(ML) INTRAVENOUS ONCE
Refills: 0 | Status: COMPLETED | OUTPATIENT
Start: 2025-05-02 | End: 2025-05-02

## 2025-05-02 RX ORDER — AMOXICILLIN AND CLAVULANATE POTASSIUM 500; 125 MG/1; MG/1
1 TABLET, FILM COATED ORAL
Qty: 20 | Refills: 0
Start: 2025-05-02 | End: 2025-05-11

## 2025-05-02 RX ORDER — LIDOCAINE HCL/PF 10 MG/ML
10 VIAL (ML) INJECTION ONCE
Refills: 0 | Status: COMPLETED | OUTPATIENT
Start: 2025-05-02 | End: 2025-05-02

## 2025-05-02 RX ADMIN — SODIUM CHLORIDE 1000 MILLILITER(S): 9 INJECTION, SOLUTION INTRAVENOUS at 15:16

## 2025-05-02 RX ADMIN — Medication 200 GRAM(S): at 13:54

## 2025-05-02 RX ADMIN — Medication 10 MILLILITER(S): at 16:41

## 2025-05-02 NOTE — CONSULT NOTE ADULT - SUBJECTIVE AND OBJECTIVE BOX
HPI:  69-year-old male with history of HTN, HLD, A-fib, myelofibrosis, melanoma, BCC, RCC, OAB, bladder cancer (s/p TURBT on 2025) with recent admission for postoperative UTI and gross hematuria. On  he was seen in Dr. Love's office and a Urine Culture was sent which resulted on  + for E. Faecalis.  He was prescribed a week of Augmentin which he completed on .  Pt has OAB and has urgency of urination at baseline for which he is on Oxybutynin ER.   On Thursday AM () he spiked a fever of 101.8 and that afternoon he began experiencing an exacerbation of his urgency.  Today he had another fever 101.5, called Dr. Love's office, and was advised to go to the ER for evaluation.  Denies any N/V, abd / flank pain, or hematuria.    PAST MEDICAL & SURGICAL HISTORY:    HTN (hypertension)      Hyperlipidemia      H/O polycythemia vera      Degenerated intervertebral disc  lumbar      Psoriasis (a type of skin inflammation)      BPH (benign prostatic hypertrophy)  denies      Melanoma in situ of skin of trunk      Bulging  cervical disc      Cataract of right eye      Peripheral neuropathy  "both feet"      Renal cell carcinoma      Basal cell carcinoma  nose      Atrial fibrillation  on Xarelto      Muscle atrophy  bilateral feet      Left cataract      Myelofibrosis      Anemia      Thrombocytopenia      Hiatal hernia      Chronic cough      Sleep apnea      Malignant neoplasm of overlapping sites of bladder      Mild pulmonary hypertension      OAB (overactive bladder)      Left varicocele      S/P cystoscopy  TURBT-2012      Melanoma in situ of back  x1-, x2-      H/O partial nephrectomy  right-12      S/P cystoscopy  5/15/14      Mohs defect  mohs procedure bilateral sides of nose with skin graft from clavicular areas 2016      H/O cataract extraction      Status post ablation of atrial fibrillation          MEDICATIONS  (STANDING):    · 	acetaminophen 325 mg oral tablet: 3 tab(s) orally every 6 hours as needed for pain  · 	diazePAM 5 mg oral tablet: 1 tab(s) orally every 6 hours as needed for Bladder spasms MDD: 4  · 	naloxone 4 mg/0.1 mL nasal spray: 1 spray(s) intranasally  · 	oxyBUTYnin 5 mg oral tablet: 1 tab(s) orally every 8 hours as needed for Bladder spasms  · 	lidocaine 5% topical ointment: 1 Apply topically to affected area every 3 hours As needed For catheter discomfort  · 	predniSONE 10 mg oral tablet: 1 tab(s) orally once a day  · 	polyethylene glycol 3350 oral powder for reconstitution: 17 gram(s) orally once a day as needed for  constipation available over the counter  · 	rosuvastatin 5 mg oral tablet: 1 tab(s) orally once a day  · 	Advair  mcg-21 mcg/inh inhalation aerosol: 2 puff(s) inhaled prn  · 	tamsulosin 0.4 mg oral capsule: 1 cap(s) orally once a day  · 	torsemide 20 mg oral tablet: 1 tab(s) orally every other day  · 	zolpidem 5 mg oral tablet: 1 tab(s) orally once a day (at bedtime) do not take with diazepam  · 	Albuterol (Eqv-ProAir HFA) 90 mcg/inh inhalation aerosol: 2 puff(s) inhaled prn  · 	finasteride 5 mg oral tablet: 1 tab(s) orally once a day  · 	omeprazole 40 mg oral delayed release capsule: 1 cap(s) orally 2 times a day  · 	metoprolol succinate 25 mg oral capsule, extended release: 0.5 cap(s) orally once a day        FAMILY HISTORY:    Family history of CHF (congestive heart failure)  parents    Family history of kidney disease  mother and brother    Family history of diabetes mellitus (Sibling)  brother    Family history of diabetes mellitus (DM)  brother    FH: lung cancer (Sibling)        Allergies    Cipro (Rash)      SOCIAL HISTORY:    No Toxic Habits      REVIEW OF SYSTEMS: Otherwise negative as stated in HPI      Vital Signs Last 24 Hrs  T(C): 37.1 (02 May 2025 14:48), Max: 37.1 (02 May 2025 14:48)  T(F): 98.7 (02 May 2025 14:48), Max: 98.7 (02 May 2025 14:48)  HR: 76 (02 May 2025 14:48) (70 - 79)  BP: 104/57 (02 May 2025 14:48) (104/57 - 136/66)  BP(mean): --  RR: 17 (02 May 2025 14:48) (16 - 17)  SpO2: 98% (02 May 2025 14:48) (96% - 98%)    Parameters below as of 02 May 2025 14:48  Patient On (Oxygen Delivery Method): room air        PHYSICAL EXAM:    General: Awake and Alert in no acute distress    Respiratory and Thorax: no resp distress   	  Cardiovascular:  Reg Rate    Gastrointestinal: soft, non tender CVAT [ ]Y  [x ]N    Genitourinary: Glans Circumcised [ x ]Y  [ ]N,  Meatus Discharge [ ]Y  [x ] N,  Blood [ ]Y [x ] N,  Testes  Descended [x ]Y  [ ]N,  Tender [ ]Y  [x ]N                        	  Musculoskeletal / Extremities:  Edema [x ]Y [ ]N,  AROM x 4 [x ]Y  [ ]N  	        LABS:                        8.5    3.14  )-----------( 28       ( 02 May 2025 13:02 )             26.1     05-02    135  |  102  |  19  ----------------------------<  171[H]  4.5   |  23  |  0.87    Ca    9.0      02 May 2025 13:02    TPro  6.7  /  Alb  3.6  /  TBili  1.0  /  DBili  x   /  AST  42[H]  /  ALT  23  /  AlkPhos  59  05-02    PT/INR - ( 02 May 2025 13:02 )   PT: 14.0 sec;   INR: 1.21 ratio         PTT - ( 02 May 2025 13:02 )  PTT:29.4 sec      Urinalysis Basic - ( 02 May 2025 13:02 )    Color: Dark Yellow / Appearance: Clear / S.006 / pH: x  Gluc: 171 mg/dL / Ketone: Negative mg/dL  / Bili: Negative / Urobili: 1.0 mg/dL   Blood: x / Protein: Negative mg/dL / Nitrite: Positive   Leuk Esterase: Large / RBC: 0 /HPF / WBC 30 /HPF   Sq Epi: x / Non Sq Epi: 1 /HPF / Bacteria: Negative /HPF      URINE CX: Pending    RADIOLOGY:     Bedside PVR performed  = 250cc    PROCEDURE:  16 f coude placed in aseptic fashion  270 cc of yellow urine collected  Pt tolerated well

## 2025-05-02 NOTE — ED PROVIDER NOTE - NSFOLLOWUPINSTRUCTIONS_ED_ALL_ED_FT
You were seen in the emergency department for fever, dysuria, and urinary frequency.  We had a urinalysis that did show that you have concern for recurrent UTI.  It is possible that this is related either to urinary retention or insufficient treatment of prior UTI.  We did obtain a urine culture which was not resulted by the time of your discharge here.    Urology evaluated you extensively and recommends that you resume taking Augmentin for an additional 10 days.  This has been sent to your pharmacy.  We also sent lidocaine gel for symptom control to your pharmacy as well.    You should return to the emergency department if you develop new or worsening symptoms, including but not limited to fever/chills, nausea/vomiting, abdominal pain, decreased urine output, or any other new or concerning symptoms that you like to have evaluated.

## 2025-05-02 NOTE — ED PROVIDER NOTE - PROGRESS NOTE DETAILS
Sven Smith MD PGY-4  White count at baseline.  Hemoglobin at baseline.  Last received Aranesp on Monday.  Platelets low at 28 but approximately stable from the 11th.  Suspect all of these are related to his known myelofibrosis.     Patient with positive UA.  Already given Zosyn. Previously grew E. faecalis according to urology (unable to see this myself). Urology at bedside/discussing the case with their team, will follow-up final recommendations. Sven Smith MD PGY-4  Discussed risks and benefits of admission versus outpatient management with patient and urology.  Patient and urology okay with outpatient follow-up. Patient with a postvoid residual 250 cc, urology placed Diana will discharge with Diana and urology follow-up    Will Rx Augmentin & LIdo gel for symptoms control PRN. Sven Smith MD PGY-4  Discussed risks and benefits of admission versus outpatient management with patient and urology.  Patient and urology okay with outpatient follow-up. Patient with a postvoid residual 250 cc, urology placed Diana will discharge with Diana and urology follow-up    Will Rx Augmentin & LIdo gel for symptoms control PRN.    Patient remains hemodynamically appropriate for age and stable.  Patient overall well-appearing and would prefer to go home rather than remain in the hospital to wait for culture results.  I find this acceptable at this time given patient's overall condition and very close follow-up.

## 2025-05-02 NOTE — ED PROVIDER NOTE - PROVIDER TOKENS
PROVIDER:[TOKEN:[[id number]],FOLLOWUP:[1-3 Days],ESTABLISHEDPATIENT:[T]],PROVIDER:[TOKEN:[[id number]],FOLLOWUP:[1-3 Days],ESTABLISHEDPATIENT:[T]]

## 2025-05-02 NOTE — ED ADULT NURSE NOTE - NSICDXPASTSURGICALHX_GEN_ALL_CORE_FT
PAST SURGICAL HISTORY:  H/O cataract extraction     H/O partial nephrectomy right-12/17/12    Left varicocele     Melanoma in situ of back x1-1983, [phone]    Mohs defect mohs procedure bilateral sides of nose with skin graft from clavicular areas 7/2016    S/P cystoscopy TURBT-05/2012    S/P cystoscopy 5/15/14    Status post ablation of atrial fibrillation

## 2025-05-02 NOTE — ED PROVIDER NOTE - CARE PROVIDERS DIRECT ADDRESSES
[email or web address]

## 2025-05-02 NOTE — ED ADULT NURSE NOTE - CHIEF COMPLAINT QUOTE
Presents to ED for evaluation of UTI. States having dysuria, urinary urgency, fevers at home. States temperature was 101.8 F this morning and took Tylenol. Reports finishing course of Amoxicillin for UTI on Monday, but symptoms persist. History of bladder tumor removal April 7th 2025, myelofibrosis. Refer to chart for full history.

## 2025-05-02 NOTE — ED PROVIDER NOTE - NSICDXPASTSURGICALHX_GEN_ALL_CORE_FT
PAST SURGICAL HISTORY:  H/O cataract extraction     H/O partial nephrectomy right-12/17/12    Left varicocele     Melanoma in situ of back x1-1983, [phone]    Mohs defect mohs procedure bilateral sides of nose with skin graft from clavicular areas 7/2016    S/P cystoscopy TURBT-05/2012    S/P cystoscopy 5/15/14    Status post ablation of atrial fibrillation

## 2025-05-02 NOTE — ED PROVIDER NOTE - PATIENT PORTAL LINK FT
You can access the FollowMyHealth Patient Portal offered by [facility] by registering at the following website: [email or web address]. By joining [facility]’s FollowMyHealth portal, you will also be able to view your health information using other applications (apps) compatible with our system.

## 2025-05-02 NOTE — ED PROVIDER NOTE - NS ED ROS FT
Well-controlled, changes made today: increase Mounjaro, medication adherence emphasized and lifestyle modifications recommended see mdm

## 2025-05-02 NOTE — CONSULT NOTE ADULT - PROBLEM SELECTOR RECOMMENDATION 9
-Based on Pt's PVR being elevated in the presence of a UTI pt needs a oates.  -Oates placed by Uro PA (see procedure note)  -Recommend additional 10 day course of Augmentin based on previous culture and Cipro allergy  -Continue Oxybutynin ER (Hold for 24h before your TOV)  -Follow up Cultures.  -Follow up with Dr. Love near the end of next week for TOV.  Dr. Love's office to call you to schedule  -Return to ER if continued fevers or worsening symptoms.  -D/W Dr. Love

## 2025-05-02 NOTE — ED PROVIDER NOTE - CARE PROVIDER_API CALL
Virgilio Justice  Cardiovascular Disease  [address] [place] [address]  Phone: [phone]  Fax: [phone]  Established Patient  Follow Up Time: 1-3 Days    Carter Love  Urology  [address] [place] [address], [address] [address]  Phone: [phone]  Fax: [phone]  Established Patient  Follow Up Time: 1-3 Days

## 2025-05-02 NOTE — ED ADULT TRIAGE NOTE - CHIEF COMPLAINT QUOTE
Presents to ED for evaluation of UTI. States having dysuria, urinary urgency, fevers at home. States temperature was 101.8 F this morning and took Tylenol. Reports finishing course of Amoxicillin for UTI on Monday, but symptoms persist. History of bladder tumor removal April 7th 2025, myelofibrosis. Presents to ED for evaluation of UTI. States having dysuria, urinary urgency, fevers at home. States temperature was 101.8 F this morning and took Tylenol. Reports finishing course of Amoxicillin for UTI on Monday, but symptoms persist. History of bladder tumor removal April 7th 2025, myelofibrosis. Refer to chart for full history.

## 2025-05-02 NOTE — ED PROVIDER NOTE - CLINICAL SUMMARY MEDICAL DECISION MAKING FREE TEXT BOX
69-year-old male with history of HTN, HLD, A-fib, myelofibrosis, melanoma, BCC, RCC, bladder cancer (status post resection on 4/7/2025) with recent admission for postoperative UTI.  He presents with dysuria, fever (Tmax 101 at home-for which he took Tylenol at around 10:30 AM).  He said he was recently treated with Augmentin which completed on Monday.  He said that he called his outpatient urologist who recommended presenting to the hospital for further evaluation.    ROS negative except as noted above    GEN: Awake, AOx3, NAD.  HEENT: NCAT  CARDIO: RRR. Normal S1/S2. Loud blowing murmur upper right sternal border  RESP: CTAB, no w/r/r; normal respiratory effort  ABD: Soft, some discomfort in suprapubis but non-peritoneal; no CVAT  MSK: No obvious deformity or ROM deficit.   SKIN: Warm, dry.   NEURO: Moves all four extremities spontaneously  PSYCH: Appropriate mood & affect.     MDM  69M with history as above presents with fever, urinary frequency and dysuria concerning for UTI.  Patient hemodynamically appropriate here and afebrile but took Tylenol approximately 3 hours prior to arrival.  Abdominal exam without significant tenderness to palpation and no CVA tenderness bilaterally no evidence of Cyrus or complicated UTI.  Wife also having cold symptoms so differential remains broad.  - Labs, cultures, empiric antibiotics, IV fluids, will discuss case with urology to determine if imaging indicated from their perspective but patient's abdominal exam very benign and would not otherwise be indicated from my perspective for now

## 2025-05-02 NOTE — ED ADULT NURSE NOTE - OBJECTIVE STATEMENT
Aleshia RN- Received pt in room 14. pt A&OX3, ambulatory with assist, family at bedside. Pt with hx of myelofibrosis, bladder tumor removal April 7, 2025. Pt coming into the ED for urinary symptoms states recently finished antibiotics for a UTI. pt c/o increasing dysuria, urinary frequency, urgency and fevers x a few days worsening. states temperature was 101.8 this morning and took Tylenol. pt appears to be in no distress at this time. respirations are equal and nonlabored, no respiratory distress noted. NSR on cardiac monitor. vitally stable. denies any chest pain, sob, headache, dizziness, n/v/d. 20 gauge IV placed in the right AC, labs sent. pt medicated as per orders. stable, safety maintained. will endorse report to primary RN Mira for further plan.

## 2025-05-02 NOTE — ED PROVIDER NOTE - ATTENDING CONTRIBUTION TO CARE
69-year-old male with history of HTN, HLD, A-fib, myelofibrosis, melanoma, BCC, RCC, bladder cancer (status post resection on 4/7/2025) c/b UTI sensitive to ampicillin placed on augmentin by urologist, completed course, now has recurrent dysuria and fever to 101 at home. Took tylenol 3 hours PTA. Abd s/nt, VSS, labs unremarkable, UA+ c/f for recurrent infection, urology consulted who rec 10 more days of augmentin, they will follow up culture and patient, will give strict return precautions. Would not perform CTAP at this time given abd s/nt and no CVAT

## 2025-05-05 LAB
-  AMPICILLIN: SIGNIFICANT CHANGE UP
-  CIPROFLOXACIN: SIGNIFICANT CHANGE UP
-  LEVOFLOXACIN: SIGNIFICANT CHANGE UP
-  TETRACYCLINE: SIGNIFICANT CHANGE UP
-  VANCOMYCIN: SIGNIFICANT CHANGE UP
CULTURE RESULTS: ABNORMAL
METHOD TYPE: SIGNIFICANT CHANGE UP
ORGANISM # SPEC MICROSCOPIC CNT: ABNORMAL
ORGANISM # SPEC MICROSCOPIC CNT: ABNORMAL
SPECIMEN SOURCE: SIGNIFICANT CHANGE UP

## 2025-05-06 ENCOUNTER — NON-APPOINTMENT (OUTPATIENT)
Age: 70
End: 2025-05-06

## 2025-05-07 ENCOUNTER — NON-APPOINTMENT (OUTPATIENT)
Age: 70
End: 2025-05-07

## 2025-05-07 LAB
CULTURE RESULTS: SIGNIFICANT CHANGE UP
CULTURE RESULTS: SIGNIFICANT CHANGE UP
SPECIMEN SOURCE: SIGNIFICANT CHANGE UP
SPECIMEN SOURCE: SIGNIFICANT CHANGE UP

## 2025-05-08 ENCOUNTER — APPOINTMENT (OUTPATIENT)
Dept: UROLOGY | Facility: CLINIC | Age: 70
End: 2025-05-08

## 2025-05-08 ENCOUNTER — RX RENEWAL (OUTPATIENT)
Age: 70
End: 2025-05-08

## 2025-05-08 VITALS — SYSTOLIC BLOOD PRESSURE: 150 MMHG | RESPIRATION RATE: 18 BRPM | DIASTOLIC BLOOD PRESSURE: 66 MMHG | HEART RATE: 80 BPM

## 2025-05-08 DIAGNOSIS — N40.0 BENIGN PROSTATIC HYPERPLASIA WITHOUT LOWER URINARY TRACT SYMPMS: ICD-10-CM

## 2025-05-08 DIAGNOSIS — R33.9 RETENTION OF URINE, UNSPECIFIED: ICD-10-CM

## 2025-05-08 DIAGNOSIS — R31.0 GROSS HEMATURIA: ICD-10-CM

## 2025-05-08 PROCEDURE — 51700 IRRIGATION OF BLADDER: CPT

## 2025-05-09 ENCOUNTER — NON-APPOINTMENT (OUTPATIENT)
Age: 70
End: 2025-05-09

## 2025-05-09 RX ORDER — METHENAMINE HIPPURATE 1 G/1
1 TABLET ORAL DAILY
Qty: 90 | Refills: 3 | Status: ACTIVE | COMMUNITY
Start: 2025-05-08 | End: 1900-01-01

## 2025-05-12 ENCOUNTER — LABORATORY RESULT (OUTPATIENT)
Age: 70
End: 2025-05-12

## 2025-05-12 ENCOUNTER — NON-APPOINTMENT (OUTPATIENT)
Age: 70
End: 2025-05-12

## 2025-05-12 ENCOUNTER — APPOINTMENT (OUTPATIENT)
Dept: PULMONOLOGY | Facility: CLINIC | Age: 70
End: 2025-05-12
Payer: MEDICARE

## 2025-05-12 VITALS
SYSTOLIC BLOOD PRESSURE: 116 MMHG | DIASTOLIC BLOOD PRESSURE: 54 MMHG | OXYGEN SATURATION: 97 % | HEART RATE: 79 BPM | RESPIRATION RATE: 16 BRPM

## 2025-05-12 DIAGNOSIS — R06.00 DYSPNEA, UNSPECIFIED: ICD-10-CM

## 2025-05-12 DIAGNOSIS — R05.3 CHRONIC COUGH: ICD-10-CM

## 2025-05-12 DIAGNOSIS — I48.91 UNSPECIFIED ATRIAL FIBRILLATION: ICD-10-CM

## 2025-05-12 DIAGNOSIS — R06.89 DYSPNEA, UNSPECIFIED: ICD-10-CM

## 2025-05-12 DIAGNOSIS — R06.02 SHORTNESS OF BREATH: ICD-10-CM

## 2025-05-12 LAB
ALBUMIN SERPL ELPH-MCNC: 3.9 G/DL
ALP BLD-CCNC: 66 U/L
ALT SERPL-CCNC: 37 U/L
ANION GAP SERPL CALC-SCNC: 16 MMOL/L
APPEARANCE: CLEAR
AST SERPL-CCNC: 53 U/L
BACTERIA: NEGATIVE /HPF
BILIRUB SERPL-MCNC: 0.9 MG/DL
BILIRUBIN URINE: NEGATIVE
BLOOD URINE: ABNORMAL
BUN SERPL-MCNC: 18 MG/DL
CALCIUM SERPL-MCNC: 9 MG/DL
CAST: 0 /LPF
CHLORIDE SERPL-SCNC: 102 MMOL/L
CO2 SERPL-SCNC: 20 MMOL/L
COLOR: NORMAL
CREAT SERPL-MCNC: 0.84 MG/DL
EGFRCR SERPLBLD CKD-EPI 2021: 95 ML/MIN/1.73M2
EPITHELIAL CELLS: 2 /HPF
GLUCOSE QUALITATIVE U: NEGATIVE MG/DL
GLUCOSE SERPL-MCNC: 151 MG/DL
KETONES URINE: NEGATIVE MG/DL
LEUKOCYTE ESTERASE URINE: ABNORMAL
MICROSCOPIC-UA: NORMAL
NITRITE URINE: NEGATIVE
NT-PROBNP SERPL-MCNC: 394 PG/ML
PH URINE: 6.5
POTASSIUM SERPL-SCNC: 4.7 MMOL/L
PROT SERPL-MCNC: 6.5 G/DL
PROTEIN URINE: 30 MG/DL
RED BLOOD CELLS URINE: 90 /HPF
SODIUM SERPL-SCNC: 137 MMOL/L
SPECIFIC GRAVITY URINE: 1.02
UROBILINOGEN URINE: 1 MG/DL
WHITE BLOOD CELLS URINE: 25 /HPF

## 2025-05-12 PROCEDURE — 94727 GAS DIL/WSHOT DETER LNG VOL: CPT

## 2025-05-12 PROCEDURE — 94060 EVALUATION OF WHEEZING: CPT

## 2025-05-12 PROCEDURE — 99214 OFFICE O/P EST MOD 30 MIN: CPT | Mod: 25

## 2025-05-12 PROCEDURE — 94729 DIFFUSING CAPACITY: CPT

## 2025-05-12 PROCEDURE — 71046 X-RAY EXAM CHEST 2 VIEWS: CPT

## 2025-05-12 PROCEDURE — 36415 COLL VENOUS BLD VENIPUNCTURE: CPT

## 2025-05-13 LAB
BASOPHILS # BLD AUTO: 0.05 K/UL
BASOPHILS NFR BLD AUTO: 1.7 %
DEPRECATED D DIMER PPP IA-ACNC: 761 NG/ML DDU
EOSINOPHIL # BLD AUTO: 0 K/UL
EOSINOPHIL NFR BLD AUTO: 0 %
HCT VFR BLD CALC: 25.4 %
HGB BLD-MCNC: 8 G/DL
LYMPHOCYTES # BLD AUTO: 0.55 K/UL
LYMPHOCYTES NFR BLD AUTO: 18.1 %
MAN DIFF?: NORMAL
MCHC RBC-ENTMCNC: 29.2 PG
MCHC RBC-ENTMCNC: 31.5 G/DL
MCV RBC AUTO: 92.7 FL
MONOCYTES # BLD AUTO: 0.11 K/UL
MONOCYTES NFR BLD AUTO: 3.5 %
NEUTROPHILS # BLD AUTO: 2.25 K/UL
NEUTROPHILS NFR BLD AUTO: 74.1 %
PLATELET # BLD AUTO: 32 K/UL
RBC # BLD: 2.74 M/UL
RBC # FLD: 23.4 %
WBC # FLD AUTO: 3.04 K/UL

## 2025-05-14 ENCOUNTER — OUTPATIENT (OUTPATIENT)
Dept: OUTPATIENT SERVICES | Facility: HOSPITAL | Age: 70
LOS: 1 days | End: 2025-05-14
Payer: MEDICARE

## 2025-05-14 ENCOUNTER — APPOINTMENT (OUTPATIENT)
Dept: CT IMAGING | Facility: CLINIC | Age: 70
End: 2025-05-14
Payer: MEDICARE

## 2025-05-14 ENCOUNTER — NON-APPOINTMENT (OUTPATIENT)
Age: 70
End: 2025-05-14

## 2025-05-14 DIAGNOSIS — R06.02 SHORTNESS OF BREATH: ICD-10-CM

## 2025-05-14 DIAGNOSIS — Z98.89 OTHER SPECIFIED POSTPROCEDURAL STATES: Chronic | ICD-10-CM

## 2025-05-14 PROCEDURE — 71275 CT ANGIOGRAPHY CHEST: CPT | Mod: 26

## 2025-05-14 PROCEDURE — 71275 CT ANGIOGRAPHY CHEST: CPT

## 2025-05-15 ENCOUNTER — APPOINTMENT (OUTPATIENT)
Dept: UROLOGY | Facility: CLINIC | Age: 70
End: 2025-05-15
Payer: MEDICARE

## 2025-05-15 ENCOUNTER — OUTPATIENT (OUTPATIENT)
Dept: OUTPATIENT SERVICES | Facility: HOSPITAL | Age: 70
LOS: 1 days | End: 2025-05-15
Payer: MEDICARE

## 2025-05-15 VITALS
DIASTOLIC BLOOD PRESSURE: 67 MMHG | OXYGEN SATURATION: 98 % | SYSTOLIC BLOOD PRESSURE: 126 MMHG | RESPIRATION RATE: 15 BRPM | HEART RATE: 88 BPM | TEMPERATURE: 98 F

## 2025-05-15 VITALS — RESPIRATION RATE: 15 BRPM | SYSTOLIC BLOOD PRESSURE: 142 MMHG | HEART RATE: 75 BPM | DIASTOLIC BLOOD PRESSURE: 69 MMHG

## 2025-05-15 DIAGNOSIS — Z98.890 OTHER SPECIFIED POSTPROCEDURAL STATES: Chronic | ICD-10-CM

## 2025-05-15 DIAGNOSIS — C67.8 MALIGNANT NEOPLASM OF OVERLAPPING SITES OF BLADDER: ICD-10-CM

## 2025-05-15 DIAGNOSIS — Z98.49 CATARACT EXTRACTION STATUS, UNSPECIFIED EYE: Chronic | ICD-10-CM

## 2025-05-15 DIAGNOSIS — R35.0 FREQUENCY OF MICTURITION: ICD-10-CM

## 2025-05-15 DIAGNOSIS — Z98.89 OTHER SPECIFIED POSTPROCEDURAL STATES: Chronic | ICD-10-CM

## 2025-05-15 LAB — BACTERIA UR CULT: NORMAL

## 2025-05-15 PROCEDURE — 51720 TREATMENT OF BLADDER LESION: CPT

## 2025-05-15 RX ORDER — GEMCITABINE 2 G/50ML
2 INJECTION, POWDER, LYOPHILIZED, FOR SOLUTION INTRAVENOUS
Qty: 1 | Refills: 0 | Status: DISCONTINUED | OUTPATIENT
Start: 2025-05-15 | End: 2025-05-15

## 2025-05-15 RX ORDER — GEMCITABINE HYDROCHLORIDE 38 MG/ML
1 INJECTION, SOLUTION INTRAVENOUS ONCE
Refills: 0 | Status: ACTIVE | OUTPATIENT
Start: 2025-05-15

## 2025-05-15 RX ORDER — HYOSCYAMINE SULFATE 0.12 MG/1
0.12 TABLET, ORALLY DISINTEGRATING ORAL
Refills: 0 | Status: COMPLETED | OUTPATIENT
Start: 2025-05-15

## 2025-05-15 RX ORDER — GEMCITABINE HYDROCHLORIDE 1 G/1
1 INJECTION, POWDER, LYOPHILIZED, FOR SOLUTION INTRAVENOUS
Qty: 1 | Refills: 0 | Status: COMPLETED | OUTPATIENT
Start: 2025-05-15

## 2025-05-15 RX ADMIN — GEMCITABINE HYDROCHLORIDE 1 GM: 1 INJECTION, POWDER, LYOPHILIZED, FOR SOLUTION INTRAVENOUS at 00:00

## 2025-05-15 RX ADMIN — HYOSCYAMINE SULFATE 1 MG: 0.12 TABLET SUBLINGUAL at 00:00

## 2025-05-19 DIAGNOSIS — C67.8 MALIGNANT NEOPLASM OF OVERLAPPING SITES OF BLADDER: ICD-10-CM

## 2025-05-22 ENCOUNTER — APPOINTMENT (OUTPATIENT)
Dept: UROLOGY | Facility: CLINIC | Age: 70
End: 2025-05-22
Payer: MEDICARE

## 2025-05-22 ENCOUNTER — OUTPATIENT (OUTPATIENT)
Dept: OUTPATIENT SERVICES | Facility: HOSPITAL | Age: 70
LOS: 1 days | End: 2025-05-22
Payer: MEDICARE

## 2025-05-22 ENCOUNTER — APPOINTMENT (OUTPATIENT)
Dept: UROLOGY | Facility: CLINIC | Age: 70
End: 2025-05-22

## 2025-05-22 VITALS
RESPIRATION RATE: 16 BRPM | HEART RATE: 72 BPM | OXYGEN SATURATION: 100 % | SYSTOLIC BLOOD PRESSURE: 130 MMHG | TEMPERATURE: 98.1 F | DIASTOLIC BLOOD PRESSURE: 66 MMHG

## 2025-05-22 VITALS — HEART RATE: 71 BPM | SYSTOLIC BLOOD PRESSURE: 132 MMHG | DIASTOLIC BLOOD PRESSURE: 76 MMHG

## 2025-05-22 DIAGNOSIS — Z98.890 OTHER SPECIFIED POSTPROCEDURAL STATES: Chronic | ICD-10-CM

## 2025-05-22 DIAGNOSIS — Z98.89 OTHER SPECIFIED POSTPROCEDURAL STATES: Chronic | ICD-10-CM

## 2025-05-22 DIAGNOSIS — R35.0 FREQUENCY OF MICTURITION: ICD-10-CM

## 2025-05-22 DIAGNOSIS — Z98.49 CATARACT EXTRACTION STATUS, UNSPECIFIED EYE: Chronic | ICD-10-CM

## 2025-05-22 DIAGNOSIS — M95.9 ACQUIRED DEFORMITY OF MUSCULOSKELETAL SYSTEM, UNSPECIFIED: Chronic | ICD-10-CM

## 2025-05-22 PROCEDURE — 51720 TREATMENT OF BLADDER LESION: CPT

## 2025-05-22 RX ORDER — HYOSCYAMINE SULFATE 0.12 MG/1
0.12 TABLET, ORALLY DISINTEGRATING ORAL
Refills: 0 | Status: COMPLETED | OUTPATIENT
Start: 2025-05-22

## 2025-05-22 RX ORDER — GEMCITABINE HYDROCHLORIDE 1 G/1
1 INJECTION, POWDER, LYOPHILIZED, FOR SOLUTION INTRAVENOUS
Qty: 1 | Refills: 0 | Status: COMPLETED | OUTPATIENT
Start: 2025-05-22 | End: 2025-05-22

## 2025-05-22 RX ORDER — GEMCITABINE HYDROCHLORIDE 38 MG/ML
1 INJECTION, SOLUTION INTRAVENOUS ONCE
Refills: 0 | Status: ACTIVE | OUTPATIENT
Start: 2025-05-22

## 2025-05-22 RX ORDER — GEMCITABINE 2 G/50ML
2 INJECTION, POWDER, LYOPHILIZED, FOR SOLUTION INTRAVENOUS
Qty: 1 | Refills: 0 | Status: COMPLETED | OUTPATIENT
Start: 2025-05-22

## 2025-05-22 RX ORDER — HYOSCYAMINE SULFATE 0.12 MG/1
0.12 TABLET, ORALLY DISINTEGRATING ORAL
Qty: 1 | Refills: 0 | Status: COMPLETED | OUTPATIENT
Start: 2025-05-22 | End: 2025-05-22

## 2025-05-22 RX ADMIN — HYOSCYAMINE SULFATE 1 MG: 0.12 TABLET SUBLINGUAL at 00:00

## 2025-05-22 RX ADMIN — GEMCITABINE HYDROCHLORIDE 0 GM: 1 INJECTION, POWDER, LYOPHILIZED, FOR SOLUTION INTRAVENOUS at 00:00

## 2025-05-23 ENCOUNTER — RX RENEWAL (OUTPATIENT)
Age: 70
End: 2025-05-23

## 2025-05-28 ENCOUNTER — RX RENEWAL (OUTPATIENT)
Age: 70
End: 2025-05-28

## 2025-05-28 DIAGNOSIS — C67.8 MALIGNANT NEOPLASM OF OVERLAPPING SITES OF BLADDER: ICD-10-CM

## 2025-05-29 ENCOUNTER — OUTPATIENT (OUTPATIENT)
Dept: OUTPATIENT SERVICES | Facility: HOSPITAL | Age: 70
LOS: 1 days | End: 2025-05-29
Payer: MEDICARE

## 2025-05-29 ENCOUNTER — APPOINTMENT (OUTPATIENT)
Dept: UROLOGY | Facility: CLINIC | Age: 70
End: 2025-05-29
Payer: MEDICARE

## 2025-05-29 DIAGNOSIS — Z98.89 OTHER SPECIFIED POSTPROCEDURAL STATES: Chronic | ICD-10-CM

## 2025-05-29 DIAGNOSIS — R35.0 FREQUENCY OF MICTURITION: ICD-10-CM

## 2025-05-29 DIAGNOSIS — Z98.890 OTHER SPECIFIED POSTPROCEDURAL STATES: Chronic | ICD-10-CM

## 2025-05-29 DIAGNOSIS — M95.9 ACQUIRED DEFORMITY OF MUSCULOSKELETAL SYSTEM, UNSPECIFIED: Chronic | ICD-10-CM

## 2025-05-29 PROCEDURE — 51720 TREATMENT OF BLADDER LESION: CPT

## 2025-05-29 RX ORDER — GEMCITABINE HYDROCHLORIDE 1 G/1
1 INJECTION, POWDER, LYOPHILIZED, FOR SOLUTION INTRAVENOUS
Qty: 1 | Refills: 0 | Status: COMPLETED | OUTPATIENT
Start: 2025-05-29

## 2025-05-29 RX ORDER — HYOSCYAMINE SULFATE 0.12 MG/1
0.12 TABLET, ORALLY DISINTEGRATING ORAL
Refills: 0 | Status: COMPLETED | OUTPATIENT
Start: 2025-05-29

## 2025-05-29 RX ORDER — GEMCITABINE HYDROCHLORIDE 38 MG/ML
1 INJECTION, SOLUTION INTRAVENOUS ONCE
Refills: 0 | Status: ACTIVE | OUTPATIENT
Start: 2025-05-29

## 2025-05-29 RX ADMIN — GEMCITABINE HYDROCHLORIDE 1 GM: 1 INJECTION, POWDER, LYOPHILIZED, FOR SOLUTION INTRAVENOUS at 00:00

## 2025-05-29 RX ADMIN — HYOSCYAMINE SULFATE 1 MG: 0.12 TABLET SUBLINGUAL at 00:00

## 2025-05-30 DIAGNOSIS — C67.8 MALIGNANT NEOPLASM OF OVERLAPPING SITES OF BLADDER: ICD-10-CM

## 2025-06-05 ENCOUNTER — OUTPATIENT (OUTPATIENT)
Dept: OUTPATIENT SERVICES | Facility: HOSPITAL | Age: 70
LOS: 1 days | End: 2025-06-05
Payer: MEDICARE

## 2025-06-05 ENCOUNTER — APPOINTMENT (OUTPATIENT)
Dept: UROLOGY | Facility: CLINIC | Age: 70
End: 2025-06-05

## 2025-06-05 VITALS — SYSTOLIC BLOOD PRESSURE: 135 MMHG | DIASTOLIC BLOOD PRESSURE: 60 MMHG | HEART RATE: 84 BPM | RESPIRATION RATE: 18 BRPM

## 2025-06-05 DIAGNOSIS — M95.9 ACQUIRED DEFORMITY OF MUSCULOSKELETAL SYSTEM, UNSPECIFIED: Chronic | ICD-10-CM

## 2025-06-05 DIAGNOSIS — Z98.89 OTHER SPECIFIED POSTPROCEDURAL STATES: Chronic | ICD-10-CM

## 2025-06-05 DIAGNOSIS — R35.0 FREQUENCY OF MICTURITION: ICD-10-CM

## 2025-06-05 DIAGNOSIS — Z98.49 CATARACT EXTRACTION STATUS, UNSPECIFIED EYE: Chronic | ICD-10-CM

## 2025-06-05 DIAGNOSIS — Z98.890 OTHER SPECIFIED POSTPROCEDURAL STATES: Chronic | ICD-10-CM

## 2025-06-05 DIAGNOSIS — C67.8 MALIGNANT NEOPLASM OF OVERLAPPING SITES OF BLADDER: ICD-10-CM

## 2025-06-05 PROCEDURE — 51720 TREATMENT OF BLADDER LESION: CPT

## 2025-06-05 RX ORDER — GEMCITABINE HYDROCHLORIDE 38 MG/ML
1 INJECTION, SOLUTION INTRAVENOUS ONCE
Refills: 0 | Status: ACTIVE | OUTPATIENT
Start: 2025-06-05

## 2025-06-05 RX ORDER — GEMCITABINE HYDROCHLORIDE 1 G/1
1 INJECTION, POWDER, LYOPHILIZED, FOR SOLUTION INTRAVENOUS
Qty: 1 | Refills: 0 | Status: COMPLETED | OUTPATIENT
Start: 2025-06-05

## 2025-06-05 RX ADMIN — GEMCITABINE HYDROCHLORIDE 0 GM: 1 INJECTION, POWDER, LYOPHILIZED, FOR SOLUTION INTRAVENOUS at 00:00

## 2025-06-09 ENCOUNTER — RX RENEWAL (OUTPATIENT)
Age: 70
End: 2025-06-09

## 2025-06-10 ENCOUNTER — RX RENEWAL (OUTPATIENT)
Age: 70
End: 2025-06-10

## 2025-06-12 ENCOUNTER — OUTPATIENT (OUTPATIENT)
Dept: OUTPATIENT SERVICES | Facility: HOSPITAL | Age: 70
LOS: 1 days | End: 2025-06-12
Payer: MEDICARE

## 2025-06-12 ENCOUNTER — APPOINTMENT (OUTPATIENT)
Dept: UROLOGY | Facility: CLINIC | Age: 70
End: 2025-06-12

## 2025-06-12 VITALS
SYSTOLIC BLOOD PRESSURE: 143 MMHG | TEMPERATURE: 97.5 F | HEART RATE: 92 BPM | DIASTOLIC BLOOD PRESSURE: 68 MMHG | RESPIRATION RATE: 16 BRPM | OXYGEN SATURATION: 99 %

## 2025-06-12 VITALS
OXYGEN SATURATION: 100 % | RESPIRATION RATE: 15 BRPM | DIASTOLIC BLOOD PRESSURE: 66 MMHG | SYSTOLIC BLOOD PRESSURE: 130 MMHG | HEART RATE: 75 BPM

## 2025-06-12 DIAGNOSIS — M95.9 ACQUIRED DEFORMITY OF MUSCULOSKELETAL SYSTEM, UNSPECIFIED: Chronic | ICD-10-CM

## 2025-06-12 DIAGNOSIS — R35.0 FREQUENCY OF MICTURITION: ICD-10-CM

## 2025-06-12 DIAGNOSIS — Z98.89 OTHER SPECIFIED POSTPROCEDURAL STATES: Chronic | ICD-10-CM

## 2025-06-12 DIAGNOSIS — C67.8 MALIGNANT NEOPLASM OF OVERLAPPING SITES OF BLADDER: ICD-10-CM

## 2025-06-12 DIAGNOSIS — Z98.49 CATARACT EXTRACTION STATUS, UNSPECIFIED EYE: Chronic | ICD-10-CM

## 2025-06-12 DIAGNOSIS — Z98.890 OTHER SPECIFIED POSTPROCEDURAL STATES: Chronic | ICD-10-CM

## 2025-06-12 PROCEDURE — 51720 TREATMENT OF BLADDER LESION: CPT

## 2025-06-12 RX ORDER — HYOSCYAMINE SULFATE 0.12 MG/1
0.12 TABLET, ORALLY DISINTEGRATING ORAL
Refills: 0 | Status: COMPLETED | OUTPATIENT
Start: 2025-06-12

## 2025-06-12 RX ORDER — GEMCITABINE HYDROCHLORIDE 38 MG/ML
1 INJECTION, SOLUTION INTRAVENOUS ONCE
Refills: 0 | Status: ACTIVE | OUTPATIENT
Start: 2025-06-12

## 2025-06-12 RX ORDER — GEMCITABINE HYDROCHLORIDE 1 G/1
1 INJECTION, POWDER, LYOPHILIZED, FOR SOLUTION INTRAVENOUS
Qty: 1 | Refills: 0 | Status: COMPLETED | OUTPATIENT
Start: 2025-06-12

## 2025-06-12 RX ADMIN — GEMCITABINE HYDROCHLORIDE 1 GM: 1 INJECTION, POWDER, LYOPHILIZED, FOR SOLUTION INTRAVENOUS at 00:00

## 2025-06-12 RX ADMIN — HYOSCYAMINE SULFATE 1 MG: 0.12 TABLET SUBLINGUAL at 00:00

## 2025-06-17 DIAGNOSIS — C67.8 MALIGNANT NEOPLASM OF OVERLAPPING SITES OF BLADDER: ICD-10-CM

## 2025-06-18 ENCOUNTER — OUTPATIENT (OUTPATIENT)
Dept: OUTPATIENT SERVICES | Facility: HOSPITAL | Age: 70
LOS: 1 days | End: 2025-06-18
Payer: MEDICARE

## 2025-06-18 ENCOUNTER — APPOINTMENT (OUTPATIENT)
Dept: UROLOGY | Facility: CLINIC | Age: 70
End: 2025-06-18
Payer: MEDICARE

## 2025-06-18 VITALS
SYSTOLIC BLOOD PRESSURE: 152 MMHG | HEART RATE: 71 BPM | TEMPERATURE: 98 F | RESPIRATION RATE: 16 BRPM | DIASTOLIC BLOOD PRESSURE: 67 MMHG | OXYGEN SATURATION: 100 %

## 2025-06-18 DIAGNOSIS — M95.9 ACQUIRED DEFORMITY OF MUSCULOSKELETAL SYSTEM, UNSPECIFIED: Chronic | ICD-10-CM

## 2025-06-18 DIAGNOSIS — Z98.89 OTHER SPECIFIED POSTPROCEDURAL STATES: Chronic | ICD-10-CM

## 2025-06-18 DIAGNOSIS — Z98.49 CATARACT EXTRACTION STATUS, UNSPECIFIED EYE: Chronic | ICD-10-CM

## 2025-06-18 DIAGNOSIS — Z98.890 OTHER SPECIFIED POSTPROCEDURAL STATES: Chronic | ICD-10-CM

## 2025-06-18 DIAGNOSIS — R35.0 FREQUENCY OF MICTURITION: ICD-10-CM

## 2025-06-18 PROCEDURE — 51720 TREATMENT OF BLADDER LESION: CPT

## 2025-06-18 RX ORDER — GEMCITABINE HYDROCHLORIDE 38 MG/ML
1 INJECTION, SOLUTION INTRAVENOUS ONCE
Refills: 0 | Status: DISCONTINUED | OUTPATIENT
Start: 2025-06-18 | End: 2025-07-02

## 2025-06-18 RX ORDER — HYOSCYAMINE SULFATE 0.12 MG/1
0.12 TABLET, ORALLY DISINTEGRATING ORAL
Refills: 0 | Status: COMPLETED | OUTPATIENT
Start: 2025-06-18

## 2025-06-18 RX ORDER — GEMCITABINE HYDROCHLORIDE 1 G/1
1 INJECTION, POWDER, LYOPHILIZED, FOR SOLUTION INTRAVENOUS
Qty: 1 | Refills: 0 | Status: COMPLETED | OUTPATIENT
Start: 2025-06-18

## 2025-06-18 RX ADMIN — HYOSCYAMINE SULFATE 1 MG: 0.12 TABLET SUBLINGUAL at 00:00

## 2025-06-18 RX ADMIN — GEMCITABINE HYDROCHLORIDE 1 GM: 1 INJECTION, POWDER, LYOPHILIZED, FOR SOLUTION INTRAVENOUS at 00:00

## 2025-06-19 ENCOUNTER — APPOINTMENT (OUTPATIENT)
Dept: UROLOGY | Facility: CLINIC | Age: 70
End: 2025-06-19

## 2025-06-19 DIAGNOSIS — C67.8 MALIGNANT NEOPLASM OF OVERLAPPING SITES OF BLADDER: ICD-10-CM

## 2025-06-25 ENCOUNTER — NON-APPOINTMENT (OUTPATIENT)
Age: 70
End: 2025-06-25

## 2025-06-26 ENCOUNTER — NON-APPOINTMENT (OUTPATIENT)
Age: 70
End: 2025-06-26

## 2025-06-26 ENCOUNTER — TRANSCRIPTION ENCOUNTER (OUTPATIENT)
Age: 70
End: 2025-06-26

## 2025-06-26 LAB
APPEARANCE: ABNORMAL
BACTERIA: ABNORMAL /HPF
BILIRUBIN URINE: NEGATIVE
BLOOD URINE: ABNORMAL
CAST: 2 /LPF
COLOR: YELLOW
EPITHELIAL CELLS: 0 /HPF
GLUCOSE QUALITATIVE U: NEGATIVE MG/DL
KETONES URINE: NEGATIVE MG/DL
LEUKOCYTE ESTERASE URINE: ABNORMAL
MICROSCOPIC-UA: NORMAL
NITRITE URINE: POSITIVE
PH URINE: 6
PROTEIN URINE: 100 MG/DL
RED BLOOD CELLS URINE: 1 /HPF
REVIEW: NORMAL
SPECIFIC GRAVITY URINE: 1.02
UROBILINOGEN URINE: 0.2 MG/DL
WHITE BLOOD CELLS URINE: 219 /HPF

## 2025-06-26 RX ORDER — CEFUROXIME AXETIL 500 MG/1
500 TABLET, FILM COATED ORAL
Qty: 14 | Refills: 0 | Status: COMPLETED | COMMUNITY
Start: 2025-06-26 | End: 2025-07-03

## 2025-06-29 LAB — BACTERIA UR CULT: ABNORMAL

## 2025-07-09 LAB — BACTERIA UR CULT: NORMAL

## 2025-07-24 ENCOUNTER — APPOINTMENT (OUTPATIENT)
Dept: UROLOGY | Facility: CLINIC | Age: 70
End: 2025-07-24
Payer: MEDICARE

## 2025-07-24 ENCOUNTER — OUTPATIENT (OUTPATIENT)
Dept: OUTPATIENT SERVICES | Facility: HOSPITAL | Age: 70
LOS: 1 days | End: 2025-07-24
Payer: MEDICARE

## 2025-07-24 VITALS — SYSTOLIC BLOOD PRESSURE: 130 MMHG | DIASTOLIC BLOOD PRESSURE: 65 MMHG | TEMPERATURE: 97.7 F

## 2025-07-24 DIAGNOSIS — C67.8 MALIGNANT NEOPLASM OF OVERLAPPING SITES OF BLADDER: ICD-10-CM

## 2025-07-24 DIAGNOSIS — Z98.890 OTHER SPECIFIED POSTPROCEDURAL STATES: Chronic | ICD-10-CM

## 2025-07-24 DIAGNOSIS — Z98.89 OTHER SPECIFIED POSTPROCEDURAL STATES: Chronic | ICD-10-CM

## 2025-07-24 DIAGNOSIS — M95.9 ACQUIRED DEFORMITY OF MUSCULOSKELETAL SYSTEM, UNSPECIFIED: Chronic | ICD-10-CM

## 2025-07-24 DIAGNOSIS — Z98.49 CATARACT EXTRACTION STATUS, UNSPECIFIED EYE: Chronic | ICD-10-CM

## 2025-07-24 DIAGNOSIS — R35.0 FREQUENCY OF MICTURITION: ICD-10-CM

## 2025-07-24 PROCEDURE — 52000 CYSTOURETHROSCOPY: CPT

## 2025-07-30 LAB — URINE CYTOLOGY: NORMAL

## 2025-08-11 ENCOUNTER — RX RENEWAL (OUTPATIENT)
Age: 70
End: 2025-08-11

## 2025-08-22 ENCOUNTER — RX RENEWAL (OUTPATIENT)
Age: 70
End: 2025-08-22

## 2025-09-03 RX ORDER — FUROSEMIDE 40 MG/1
40 TABLET ORAL DAILY
Qty: 30 | Refills: 1 | Status: ACTIVE | COMMUNITY
Start: 2025-09-03 | End: 1900-01-01

## 2025-09-15 DIAGNOSIS — R20.9 UNSPECIFIED DISTURBANCES OF SKIN SENSATION: ICD-10-CM

## 2025-09-17 ENCOUNTER — APPOINTMENT (OUTPATIENT)
Dept: CARDIOLOGY | Facility: CLINIC | Age: 70
End: 2025-09-17

## (undated) DEVICE — BAG URINE W METER 2L

## (undated) DEVICE — SYR TOOMEY 50ML

## (undated) DEVICE — WARMING BLANKET UPPER ADULT

## (undated) DEVICE — Device

## (undated) DEVICE — ELCTR HF RESECTION LOOP 24FR 30 DEG 0.35 WIRE

## (undated) DEVICE — SOL IRR POUR H2O 500ML

## (undated) DEVICE — PACK CYSTO

## (undated) DEVICE — POSITIONER STRAP ARMBOARD VELCRO TS-30

## (undated) DEVICE — TUBING THERMADX UROLOGY

## (undated) DEVICE — SOL IRR BAG H2O 3000ML

## (undated) DEVICE — ELCTR GROUNDING PAD ADULT COVIDIEN

## (undated) DEVICE — CANISTER DISPOSABLE THIN WALL 3000CC

## (undated) DEVICE — DURABLE MEDICAL EQUIPMENT: Type: DURABLE MEDICAL EQUIPMENT

## (undated) DEVICE — GLV 7.5 PROTEXIS (CREAM) MICRO

## (undated) DEVICE — TUBING LEVEL ONE NORMOFLO SET

## (undated) DEVICE — LIJ/LIA-ADAPTER LCKNG ELLIK EVACUATING: Type: DURABLE MEDICAL EQUIPMENT

## (undated) DEVICE — VENODYNE/SCD SLEEVE CALF MEDIUM

## (undated) DEVICE — CABLE DAC ACTIVE CORD

## (undated) DEVICE — GLV 8 PROTEXIS (CREAM) MICRO